# Patient Record
Sex: MALE | Race: WHITE | NOT HISPANIC OR LATINO | Employment: OTHER | URBAN - METROPOLITAN AREA
[De-identification: names, ages, dates, MRNs, and addresses within clinical notes are randomized per-mention and may not be internally consistent; named-entity substitution may affect disease eponyms.]

---

## 2023-04-10 DIAGNOSIS — R91.1 LUNG NODULE: Primary | ICD-10-CM

## 2023-05-12 ENCOUNTER — OFFICE VISIT (OUTPATIENT)
Dept: PRIMARY CARE | Facility: CLINIC | Age: 73
End: 2023-05-12
Payer: MEDICARE

## 2023-05-12 VITALS
TEMPERATURE: 96.4 F | HEIGHT: 71 IN | DIASTOLIC BLOOD PRESSURE: 68 MMHG | HEART RATE: 67 BPM | SYSTOLIC BLOOD PRESSURE: 105 MMHG | BODY MASS INDEX: 23.52 KG/M2 | WEIGHT: 168 LBS

## 2023-05-12 DIAGNOSIS — I10 ESSENTIAL (PRIMARY) HYPERTENSION: ICD-10-CM

## 2023-05-12 DIAGNOSIS — Z00.00 WELLNESS EXAMINATION: ICD-10-CM

## 2023-05-12 DIAGNOSIS — Z13.6 ENCOUNTER FOR ABDOMINAL AORTIC ANEURYSM SCREENING: ICD-10-CM

## 2023-05-12 DIAGNOSIS — I10 ESSENTIAL HYPERTENSION: ICD-10-CM

## 2023-05-12 DIAGNOSIS — G81.94 LEFT HEMIPARESIS (MULTI): Primary | ICD-10-CM

## 2023-05-12 DIAGNOSIS — F17.201 TOBACCO ABUSE, IN REMISSION: ICD-10-CM

## 2023-05-12 DIAGNOSIS — I65.21 CAROTID STENOSIS, RIGHT: ICD-10-CM

## 2023-05-12 DIAGNOSIS — E78.00 PURE HYPERCHOLESTEROLEMIA: ICD-10-CM

## 2023-05-12 PROBLEM — I69.398 DEPRESSION DUE TO OLD STROKE: Status: ACTIVE | Noted: 2023-05-12

## 2023-05-12 PROBLEM — I69.398 DEPRESSION DUE TO OLD STROKE: Status: RESOLVED | Noted: 2023-05-12 | Resolved: 2023-05-12

## 2023-05-12 PROBLEM — F06.31 DEPRESSION DUE TO OLD STROKE: Status: ACTIVE | Noted: 2023-05-12

## 2023-05-12 PROBLEM — F06.31 DEPRESSION DUE TO OLD STROKE: Status: RESOLVED | Noted: 2023-05-12 | Resolved: 2023-05-12

## 2023-05-12 PROBLEM — K21.9 GASTROESOPHAGEAL REFLUX DISEASE: Status: ACTIVE | Noted: 2023-05-12

## 2023-05-12 PROCEDURE — 1036F TOBACCO NON-USER: CPT | Performed by: INTERNAL MEDICINE

## 2023-05-12 PROCEDURE — 3078F DIAST BP <80 MM HG: CPT | Performed by: INTERNAL MEDICINE

## 2023-05-12 PROCEDURE — 1160F RVW MEDS BY RX/DR IN RCRD: CPT | Performed by: INTERNAL MEDICINE

## 2023-05-12 PROCEDURE — 1159F MED LIST DOCD IN RCRD: CPT | Performed by: INTERNAL MEDICINE

## 2023-05-12 PROCEDURE — 3074F SYST BP LT 130 MM HG: CPT | Performed by: INTERNAL MEDICINE

## 2023-05-12 PROCEDURE — 99213 OFFICE O/P EST LOW 20 MIN: CPT | Performed by: INTERNAL MEDICINE

## 2023-05-12 RX ORDER — METOPROLOL TARTRATE 25 MG/1
1 TABLET, FILM COATED ORAL EVERY OTHER DAY
COMMUNITY
Start: 2021-02-19 | End: 2023-08-11 | Stop reason: ALTCHOICE

## 2023-05-12 RX ORDER — CLOPIDOGREL BISULFATE 75 MG/1
1 TABLET ORAL DAILY
COMMUNITY
Start: 2021-05-16 | End: 2023-08-04

## 2023-05-12 RX ORDER — ATORVASTATIN CALCIUM 80 MG/1
1 TABLET, FILM COATED ORAL DAILY
COMMUNITY
Start: 2021-05-16 | End: 2023-08-04

## 2023-05-12 RX ORDER — NAPROXEN SODIUM 220 MG/1
TABLET, FILM COATED ORAL
COMMUNITY

## 2023-05-12 RX ORDER — ALBUTEROL SULFATE 90 UG/1
AEROSOL, METERED RESPIRATORY (INHALATION)
COMMUNITY
Start: 2021-02-10

## 2023-05-12 RX ORDER — ESOMEPRAZOLE MAGNESIUM 40 MG/1
1 CAPSULE, DELAYED RELEASE ORAL DAILY
COMMUNITY
Start: 2021-02-19 | End: 2023-08-04 | Stop reason: SDUPTHER

## 2023-05-12 RX ORDER — ESCITALOPRAM OXALATE 10 MG/1
1 TABLET ORAL DAILY
COMMUNITY
Start: 2021-02-19 | End: 2023-11-14 | Stop reason: SDUPTHER

## 2023-05-12 ASSESSMENT — ENCOUNTER SYMPTOMS
GASTROINTESTINAL NEGATIVE: 1
MUSCULOSKELETAL NEGATIVE: 1
CONSTITUTIONAL NEGATIVE: 1
ARTHRALGIAS: 0
EYES NEGATIVE: 1
WEAKNESS: 1
ABDOMINAL PAIN: 0
CARDIOVASCULAR NEGATIVE: 1
OCCASIONAL FEELINGS OF UNSTEADINESS: 0
RESPIRATORY NEGATIVE: 1
ANOREXIA: 0
NUMBNESS: 1
LOSS OF SENSATION IN FEET: 1
DIAPHORESIS: 0
DEPRESSION: 0

## 2023-05-12 NOTE — PROGRESS NOTES
"Subjective   Patient ID: Lukas Shen is a 73 y.o. male who presents for Follow-up (Has marks on chest that he scratched.   ).    Cerebrovascular Accident  This is a chronic problem. The current episode started more than 1 year ago. The problem occurs rarely. The problem has been gradually improving. Associated symptoms include congestion, numbness and weakness. Pertinent negatives include no abdominal pain, anorexia, arthralgias, chest pain or diaphoresis. The treatment provided moderate relief.    Carotids has been checked in Nov 2022, has total occlusion rt side    Review of Systems   Constitutional: Negative.  Negative for diaphoresis.   HENT:  Positive for congestion.    Eyes: Negative.    Respiratory: Negative.     Cardiovascular: Negative.  Negative for chest pain.   Gastrointestinal: Negative.  Negative for abdominal pain and anorexia.   Musculoskeletal: Negative.  Negative for arthralgias.   Skin: Negative.    Neurological:  Positive for weakness and numbness.       Objective   /68 (BP Location: Right arm, Patient Position: Sitting, BP Cuff Size: Adult)   Pulse 67   Temp 35.8 °C (96.4 °F) (Temporal)   Ht 1.803 m (5' 11\")   Wt 76.2 kg (168 lb)   BMI 23.43 kg/m²     Physical Exam  Vitals reviewed.   Constitutional:       Appearance: Normal appearance. He is normal weight.   HENT:      Head: Normocephalic and atraumatic.   Eyes:      Conjunctiva/sclera: Conjunctivae normal.   Cardiovascular:      Rate and Rhythm: Normal rate and regular rhythm.      Pulses: Normal pulses.   Pulmonary:      Effort: Pulmonary effort is normal.      Breath sounds: Normal breath sounds.   Abdominal:      Palpations: Abdomen is soft.   Musculoskeletal:         General: Normal range of motion.      Cervical back: Normal range of motion.   Skin:     General: Skin is warm and dry.   Neurological:      Motor: Weakness present.      Gait: Gait abnormal.   Psychiatric:         Mood and Affect: Mood normal. "       Assessment/Plan   Problem List Items Addressed This Visit          Nervous    Left hemiparesis (CMS/HCC) - Primary       Circulatory    Essential hypertension    Carotid stenosis, right       Other    Pure hypercholesterolemia   We are trying to do low-dose CT scan of this lung as it was mentioned, I see that there was a message and actually I am very well aware about doing low-dose CT scan as appropriately as they are asking, they are giving hard time in the hospital and scheduling it 4 months from the scheduling time when family calls so it is very difficult for us to do things as they are asking us to do repeatedly.  So we call scheduling and we got his low-dose CT scan of lung scheduled on 26 May, also he will require abdominal ultrasound for aneurysm screening, also he will require laboratories next time.  He is doing well, he has a stroke, he has a residual deficit, he has a totally occluded carotid artery, his BP readings are stable, he did not pass out, high-dose of statin therapy to be continued.  He has some congestion in the throat but does not have any pain or discomfort consistent with angina, vaccinations are reviewed, follow-up in 3 months.

## 2023-05-15 ENCOUNTER — LAB (OUTPATIENT)
Dept: LAB | Facility: LAB | Age: 73
End: 2023-05-15
Payer: MEDICARE

## 2023-05-15 DIAGNOSIS — Z00.00 WELLNESS EXAMINATION: ICD-10-CM

## 2023-05-15 DIAGNOSIS — E78.00 PURE HYPERCHOLESTEROLEMIA: ICD-10-CM

## 2023-05-15 DIAGNOSIS — I10 ESSENTIAL HYPERTENSION: ICD-10-CM

## 2023-05-15 LAB
ALANINE AMINOTRANSFERASE (SGPT) (U/L) IN SER/PLAS: 32 U/L (ref 10–52)
ALBUMIN (G/DL) IN SER/PLAS: 4.1 G/DL (ref 3.4–5)
ALKALINE PHOSPHATASE (U/L) IN SER/PLAS: 123 U/L (ref 33–136)
ANION GAP IN SER/PLAS: 10 MMOL/L (ref 10–20)
ASPARTATE AMINOTRANSFERASE (SGOT) (U/L) IN SER/PLAS: 19 U/L (ref 9–39)
BASOPHILS (10*3/UL) IN BLOOD BY AUTOMATED COUNT: 0.06 X10E9/L (ref 0–0.1)
BASOPHILS/100 LEUKOCYTES IN BLOOD BY AUTOMATED COUNT: 0.8 % (ref 0–2)
BILIRUBIN TOTAL (MG/DL) IN SER/PLAS: 0.7 MG/DL (ref 0–1.2)
CALCIUM (MG/DL) IN SER/PLAS: 9.7 MG/DL (ref 8.6–10.3)
CARBON DIOXIDE, TOTAL (MMOL/L) IN SER/PLAS: 32 MMOL/L (ref 21–32)
CHLORIDE (MMOL/L) IN SER/PLAS: 103 MMOL/L (ref 98–107)
CHOLESTEROL (MG/DL) IN SER/PLAS: 115 MG/DL (ref 0–199)
CHOLESTEROL IN HDL (MG/DL) IN SER/PLAS: 31.8 MG/DL
CHOLESTEROL/HDL RATIO: 3.6
CREATININE (MG/DL) IN SER/PLAS: 1.19 MG/DL (ref 0.5–1.3)
EOSINOPHILS (10*3/UL) IN BLOOD BY AUTOMATED COUNT: 0.14 X10E9/L (ref 0–0.4)
EOSINOPHILS/100 LEUKOCYTES IN BLOOD BY AUTOMATED COUNT: 1.9 % (ref 0–6)
ERYTHROCYTE DISTRIBUTION WIDTH (RATIO) BY AUTOMATED COUNT: 12.4 % (ref 11.5–14.5)
ERYTHROCYTE MEAN CORPUSCULAR HEMOGLOBIN CONCENTRATION (G/DL) BY AUTOMATED: 33.4 G/DL (ref 32–36)
ERYTHROCYTE MEAN CORPUSCULAR VOLUME (FL) BY AUTOMATED COUNT: 95 FL (ref 80–100)
ERYTHROCYTES (10*6/UL) IN BLOOD BY AUTOMATED COUNT: 4.9 X10E12/L (ref 4.5–5.9)
GFR MALE: 64 ML/MIN/1.73M2
GLUCOSE (MG/DL) IN SER/PLAS: 81 MG/DL (ref 74–99)
HEMATOCRIT (%) IN BLOOD BY AUTOMATED COUNT: 46.4 % (ref 41–52)
HEMOGLOBIN (G/DL) IN BLOOD: 15.5 G/DL (ref 13.5–17.5)
HEPATITIS C VIRUS AB PRESENCE IN SERUM: NONREACTIVE
IMMATURE GRANULOCYTES/100 LEUKOCYTES IN BLOOD BY AUTOMATED COUNT: 0.1 % (ref 0–0.9)
LDL: 63 MG/DL (ref 0–99)
LEUKOCYTES (10*3/UL) IN BLOOD BY AUTOMATED COUNT: 7.4 X10E9/L (ref 4.4–11.3)
LYMPHOCYTES (10*3/UL) IN BLOOD BY AUTOMATED COUNT: 1.53 X10E9/L (ref 0.8–3)
LYMPHOCYTES/100 LEUKOCYTES IN BLOOD BY AUTOMATED COUNT: 20.8 % (ref 13–44)
MONOCYTES (10*3/UL) IN BLOOD BY AUTOMATED COUNT: 0.41 X10E9/L (ref 0.05–0.8)
MONOCYTES/100 LEUKOCYTES IN BLOOD BY AUTOMATED COUNT: 5.6 % (ref 2–10)
NEUTROPHILS (10*3/UL) IN BLOOD BY AUTOMATED COUNT: 5.21 X10E9/L (ref 1.6–5.5)
NEUTROPHILS/100 LEUKOCYTES IN BLOOD BY AUTOMATED COUNT: 70.8 % (ref 40–80)
PLATELETS (10*3/UL) IN BLOOD AUTOMATED COUNT: 257 X10E9/L (ref 150–450)
POTASSIUM (MMOL/L) IN SER/PLAS: 4.3 MMOL/L (ref 3.5–5.3)
PROSTATE SPECIFIC ANTIGEN,SCREEN: 0.79 NG/ML (ref 0–4)
PROTEIN TOTAL: 7.3 G/DL (ref 6.4–8.2)
SODIUM (MMOL/L) IN SER/PLAS: 141 MMOL/L (ref 136–145)
TRIGLYCERIDE (MG/DL) IN SER/PLAS: 99 MG/DL (ref 0–149)
UREA NITROGEN (MG/DL) IN SER/PLAS: 14 MG/DL (ref 6–23)
VLDL: 20 MG/DL (ref 0–40)

## 2023-05-15 PROCEDURE — 85025 COMPLETE CBC W/AUTO DIFF WBC: CPT

## 2023-05-15 PROCEDURE — 36415 COLL VENOUS BLD VENIPUNCTURE: CPT

## 2023-05-15 PROCEDURE — 86803 HEPATITIS C AB TEST: CPT

## 2023-05-15 PROCEDURE — 84153 ASSAY OF PSA TOTAL: CPT

## 2023-05-15 PROCEDURE — 80061 LIPID PANEL: CPT

## 2023-05-15 PROCEDURE — 80053 COMPREHEN METABOLIC PANEL: CPT

## 2023-08-04 DIAGNOSIS — F32.A DEPRESSION, UNSPECIFIED DEPRESSION TYPE: ICD-10-CM

## 2023-08-04 DIAGNOSIS — I65.21 CAROTID STENOSIS, RIGHT: Primary | ICD-10-CM

## 2023-08-04 DIAGNOSIS — K21.9 GASTROESOPHAGEAL REFLUX DISEASE WITHOUT ESOPHAGITIS: ICD-10-CM

## 2023-08-04 RX ORDER — ATORVASTATIN CALCIUM 80 MG/1
80 TABLET, FILM COATED ORAL DAILY
Qty: 90 TABLET | Refills: 3 | Status: SHIPPED | OUTPATIENT
Start: 2023-08-04 | End: 2023-11-14 | Stop reason: SDUPTHER

## 2023-08-04 RX ORDER — ESOMEPRAZOLE MAGNESIUM 40 MG/1
40 CAPSULE, DELAYED RELEASE ORAL DAILY
Qty: 90 CAPSULE | Refills: 3 | Status: SHIPPED | OUTPATIENT
Start: 2023-08-04 | End: 2023-11-14 | Stop reason: SDUPTHER

## 2023-08-04 RX ORDER — ESCITALOPRAM OXALATE 20 MG/1
20 TABLET ORAL DAILY
Qty: 90 TABLET | Refills: 3 | Status: SHIPPED | OUTPATIENT
Start: 2023-08-04 | End: 2023-08-11

## 2023-08-04 RX ORDER — CLOPIDOGREL BISULFATE 75 MG/1
75 TABLET ORAL DAILY
Qty: 90 TABLET | Refills: 3 | Status: SHIPPED | OUTPATIENT
Start: 2023-08-04 | End: 2023-11-14 | Stop reason: SDUPTHER

## 2023-08-08 ENCOUNTER — PATIENT OUTREACH (OUTPATIENT)
Dept: CARE COORDINATION | Facility: CLINIC | Age: 73
End: 2023-08-08
Payer: MEDICARE

## 2023-08-08 RX ORDER — DOXYCYCLINE 100 MG/1
100 CAPSULE ORAL 2 TIMES DAILY
COMMUNITY
Start: 2023-08-07 | End: 2023-08-11 | Stop reason: ALTCHOICE

## 2023-08-08 RX ORDER — L. ACIDOPHILUS/LACTOBAC SPOR 35MM-25MM
1 TABLET ORAL DAILY
COMMUNITY
Start: 2023-08-07 | End: 2023-08-16

## 2023-08-08 NOTE — PROGRESS NOTES
Discharge Facility: [facility]  Discharge Diagnosis: presyncopal episode, pneumonia  Admission Date: 8/3/23  Discharge Date: 8/7/23    PCP Appointment Date: 8/11/23  Specialist Appointment Date: Feb 2024 Dr Jaquez  Hospital Encounter and Summary: Linked  See discharge assessment below for further details    Engagement  Call Start Time: 1017 (8/8/2023 10:33 AM)    Medications  Medications reviewed with patient/caregiver?: Yes (8/8/2023 10:33 AM)  Is the patient having any side effects they believe may be caused by any medication additions or changes?: No (8/8/2023 10:33 AM)  Does the patient have all medications ordered at discharge?: Yes (8/8/2023 10:33 AM)  Care Management Interventions: No intervention needed (8/8/2023 10:33 AM)  Prescription Comments: on course of doxycycline for pneumonia (8/8/2023 10:33 AM)  Is the patient taking all medications as directed (includes completed medication regime)?: Yes (8/8/2023 10:33 AM)  Care Management Interventions: Provided patient education (8/8/2023 10:33 AM)    Appointments  Does the patient have a primary care provider?: Yes (8/8/2023 10:33 AM)  Care Management Interventions: Verified appointment date/time/provider (8/8/2023 10:33 AM)  Has the patient kept scheduled appointments due by today?: Yes (8/8/2023 10:33 AM)  Care Management Interventions: Advised patient to keep appointment (8/8/2023 10:33 AM)    Patient Teaching  Does the patient have access to their discharge instructions?: Yes (8/8/2023 10:33 AM)  Care Management Interventions: Reviewed instructions with patient (8/8/2023 10:33 AM)  What is the patient's perception of their health status since discharge?: Improving (8/8/2023 10:33 AM)  Is the patient/caregiver able to teach back the hierarchy of who to call/visit for symptoms/problems? PCP, Specialist, Home Health nurse, Urgent Care, ED, 911: Yes (8/8/2023 10:33 AM)  Patient/Caregiver Education Comments: Spoke with spouse patient is doing better after hospital  stay, he has less diarrhea and is resting at home. Aware of when to return to hospital (8/8/2023 10:33 AM)    Wrap Up  Call End Time: 1025 (8/8/2023 10:33 AM)

## 2023-08-11 ENCOUNTER — OFFICE VISIT (OUTPATIENT)
Dept: PRIMARY CARE | Facility: CLINIC | Age: 73
End: 2023-08-11
Payer: MEDICARE

## 2023-08-11 VITALS
SYSTOLIC BLOOD PRESSURE: 98 MMHG | WEIGHT: 160 LBS | BODY MASS INDEX: 25.11 KG/M2 | HEART RATE: 69 BPM | TEMPERATURE: 97.7 F | HEIGHT: 67 IN | DIASTOLIC BLOOD PRESSURE: 64 MMHG

## 2023-08-11 DIAGNOSIS — I65.21 CAROTID STENOSIS, RIGHT: ICD-10-CM

## 2023-08-11 DIAGNOSIS — R55 VASOVAGAL SYNCOPE: Primary | ICD-10-CM

## 2023-08-11 DIAGNOSIS — R91.8 PULMONARY INFILTRATES: ICD-10-CM

## 2023-08-11 PROCEDURE — 99496 TRANSJ CARE MGMT HIGH F2F 7D: CPT | Performed by: INTERNAL MEDICINE

## 2023-08-11 PROCEDURE — 1159F MED LIST DOCD IN RCRD: CPT | Performed by: INTERNAL MEDICINE

## 2023-08-11 PROCEDURE — 1036F TOBACCO NON-USER: CPT | Performed by: INTERNAL MEDICINE

## 2023-08-11 PROCEDURE — 1160F RVW MEDS BY RX/DR IN RCRD: CPT | Performed by: INTERNAL MEDICINE

## 2023-08-11 PROCEDURE — 3078F DIAST BP <80 MM HG: CPT | Performed by: INTERNAL MEDICINE

## 2023-08-11 PROCEDURE — 3074F SYST BP LT 130 MM HG: CPT | Performed by: INTERNAL MEDICINE

## 2023-08-11 ASSESSMENT — ENCOUNTER SYMPTOMS
EYES NEGATIVE: 1
MUSCULOSKELETAL NEGATIVE: 1
RESPIRATORY NEGATIVE: 1
CARDIOVASCULAR NEGATIVE: 1
DIZZINESS: 0
GASTROINTESTINAL NEGATIVE: 1
CONSTITUTIONAL NEGATIVE: 1

## 2023-08-11 NOTE — PROGRESS NOTES
"Patient: Lukas Shen  : 1950  PCP: Conor Roe MD  MRN: [id number]  Program: No linked episodes     Lukas Shen is a 73 y.o. male presenting today for follow-up after being discharged from the hospital 5 days ago. The main problem requiring admission was syncope and infiltrate. The discharge summary and/or Transitional Care Management documentation was reviewed. Medication reconciliation was performed as indicated via the \"Champ as Reviewed\" timestamp.     Lukas Shen was contacted by Transitional Care Management services two days after his discharge. This encounter and supporting documentation was reviewed.    The complexity of medical decision making for this patient's transitional care is high.  Patient was hospitalized, he has a syncopal episode, he was having bowel movement, it was a vasovagal reaction, multiple test and investigations were done, there was alveolar infiltrates, it could be the sequelae of some aspiration he kept on having, he was not septic, they did a vascular study, there was a chronic problem in the vasculature, nothing new was seen, chronic occlusion of right carotid artery is nothing new, he has a massive stroke more than 2 years ago, no other etiology was found for a syncopal episode, he is on beta-blocker on every other day regimen, previously we have noticed that his BP readings has been low on several occasions, low-dose CT from May was reviewed.  He looks much better today, discharge summary and medications were reviewed.  Physical Exam  Vitals reviewed.   Constitutional:       Appearance: Normal appearance. He is normal weight.   HENT:      Head: Normocephalic and atraumatic.   Eyes:      Conjunctiva/sclera: Conjunctivae normal.   Cardiovascular:      Rate and Rhythm: Normal rate and regular rhythm.      Pulses:           Carotid pulses are 1+ on the right side with bruit and 2+ on the left side.  Pulmonary:      Effort: Pulmonary effort is normal.      Breath " sounds: Normal breath sounds.   Abdominal:      Palpations: Abdomen is soft.   Musculoskeletal:         General: Normal range of motion.      Cervical back: Normal range of motion.   Skin:     General: Skin is warm and dry.   Neurological:      General: No focal deficit present.      Motor: Weakness present.      Gait: Gait abnormal.   Psychiatric:         Mood and Affect: Mood normal.       Assessment/Plan   Problem List Items Addressed This Visit       Carotid stenosis, right     Other Visit Diagnoses       Vasovagal syncope    -  Primary    Pulmonary infiltrates            Recent hospitalization data and information reviewed, all reports, labs, radiological investigations and consultations and follow ups reviewed during this visit. Pt is doing well, precautions to be taken in the future for acute ailments or acute illness and change of condition are discussed, will continue to have close follow up, medication list was reviewed and updated and necessary changes were applied.  We will discontinue metoprolol, still BP readings remains on the low side of normal, he has a chronically lower BP readings, he never has been hypertensive, so high-dose of statin to be continued, totally occluded carotid needs to be left alone, statin, aspirin, Plavix has been maintained, syncopal episode happened third time now, every time seems to be vasovagal, pulmonary infiltrate was not worrisome, he is back to his routine, went through all the medications and test and investigations, follow-up in 3 months.  This is a transitional care related visit.    Review of Systems   Constitutional: Negative.    HENT: Negative.     Eyes: Negative.    Respiratory: Negative.     Cardiovascular: Negative.    Gastrointestinal: Negative.    Musculoskeletal: Negative.    Skin: Negative.    Neurological:  Negative for dizziness.       No family history on file.    Engagement  Call Start Time: 1017 (8/8/2023 10:33 AM)    Medications  Medications reviewed  with patient/caregiver?: Yes (8/8/2023 10:33 AM)  Is the patient having any side effects they believe may be caused by any medication additions or changes?: No (8/8/2023 10:33 AM)  Does the patient have all medications ordered at discharge?: Yes (8/8/2023 10:33 AM)  Care Management Interventions: No intervention needed (8/8/2023 10:33 AM)  Prescription Comments: on course of doxycycline for pneumonia (8/8/2023 10:33 AM)  Is the patient taking all medications as directed (includes completed medication regime)?: Yes (8/8/2023 10:33 AM)  Care Management Interventions: Provided patient education (8/8/2023 10:33 AM)    Appointments  Does the patient have a primary care provider?: Yes (8/8/2023 10:33 AM)  Care Management Interventions: Verified appointment date/time/provider (8/8/2023 10:33 AM)  Has the patient kept scheduled appointments due by today?: Yes (8/8/2023 10:33 AM)  Care Management Interventions: Advised patient to keep appointment (8/8/2023 10:33 AM)    Patient Teaching  Does the patient have access to their discharge instructions?: Yes (8/8/2023 10:33 AM)  Care Management Interventions: Reviewed instructions with patient (8/8/2023 10:33 AM)  What is the patient's perception of their health status since discharge?: Improving (8/8/2023 10:33 AM)  Is the patient/caregiver able to teach back the hierarchy of who to call/visit for symptoms/problems? PCP, Specialist, Home Health nurse, Urgent Care, ED, 911: Yes (8/8/2023 10:33 AM)  Patient/Caregiver Education Comments: Spoke with spouse patient is doing better after hospital stay, he has less diarrhea and is resting at home. Aware of when to return to hospital (8/8/2023 10:33 AM)    Wrap Up  Call End Time: 1025 (8/8/2023 10:33 AM)        No follow-ups on file.

## 2023-08-16 ENCOUNTER — PATIENT OUTREACH (OUTPATIENT)
Dept: CARE COORDINATION | Facility: CLINIC | Age: 73
End: 2023-08-16
Payer: MEDICARE

## 2023-08-16 NOTE — PROGRESS NOTES
Call regarding appt. with PCP on 8/11/23 after hospitalization.  At time of outreach call the patient feels as if their condition has improved since last visit.  Reviewed the PCP appointment with the pt and addressed any questions or concerns. Much better since off metoprolol

## 2023-09-19 ENCOUNTER — PATIENT OUTREACH (OUTPATIENT)
Dept: CARE COORDINATION | Facility: CLINIC | Age: 73
End: 2023-09-19
Payer: MEDICARE

## 2023-10-31 ENCOUNTER — PATIENT OUTREACH (OUTPATIENT)
Dept: CARE COORDINATION | Facility: CLINIC | Age: 73
End: 2023-10-31
Payer: MEDICARE

## 2023-10-31 NOTE — PROGRESS NOTES
90 day (final) call post hospital stay completed.  This CM called and spoke with pt  via phone to address any final questions or concerns regarding recent hospitalization.  Pt reports doing well and has no concerns at this time.  Contact info provided to pt for non-emergent concerns.

## 2023-11-14 ENCOUNTER — OFFICE VISIT (OUTPATIENT)
Dept: PRIMARY CARE | Facility: CLINIC | Age: 73
End: 2023-11-14
Payer: MEDICARE

## 2023-11-14 VITALS
TEMPERATURE: 96.9 F | HEART RATE: 67 BPM | BODY MASS INDEX: 25.11 KG/M2 | DIASTOLIC BLOOD PRESSURE: 68 MMHG | SYSTOLIC BLOOD PRESSURE: 100 MMHG | HEIGHT: 67 IN | WEIGHT: 160 LBS

## 2023-11-14 DIAGNOSIS — I63.231 CEREBROVASCULAR ACCIDENT (CVA) DUE TO STENOSIS OF RIGHT CAROTID ARTERY (MULTI): ICD-10-CM

## 2023-11-14 DIAGNOSIS — L98.9 SKIN LESION: Primary | ICD-10-CM

## 2023-11-14 DIAGNOSIS — E78.00 PURE HYPERCHOLESTEROLEMIA: ICD-10-CM

## 2023-11-14 DIAGNOSIS — K21.9 GASTROESOPHAGEAL REFLUX DISEASE WITHOUT ESOPHAGITIS: ICD-10-CM

## 2023-11-14 DIAGNOSIS — I65.21 CAROTID STENOSIS, RIGHT: ICD-10-CM

## 2023-11-14 DIAGNOSIS — F32.A DEPRESSION, UNSPECIFIED DEPRESSION TYPE: ICD-10-CM

## 2023-11-14 PROCEDURE — 1159F MED LIST DOCD IN RCRD: CPT | Performed by: INTERNAL MEDICINE

## 2023-11-14 PROCEDURE — 1036F TOBACCO NON-USER: CPT | Performed by: INTERNAL MEDICINE

## 2023-11-14 PROCEDURE — 3078F DIAST BP <80 MM HG: CPT | Performed by: INTERNAL MEDICINE

## 2023-11-14 PROCEDURE — 99213 OFFICE O/P EST LOW 20 MIN: CPT | Performed by: INTERNAL MEDICINE

## 2023-11-14 PROCEDURE — 3074F SYST BP LT 130 MM HG: CPT | Performed by: INTERNAL MEDICINE

## 2023-11-14 PROCEDURE — 1160F RVW MEDS BY RX/DR IN RCRD: CPT | Performed by: INTERNAL MEDICINE

## 2023-11-14 RX ORDER — ATORVASTATIN CALCIUM 80 MG/1
80 TABLET, FILM COATED ORAL DAILY
Qty: 90 TABLET | Refills: 3 | Status: SHIPPED | OUTPATIENT
Start: 2023-11-14

## 2023-11-14 RX ORDER — CLOPIDOGREL BISULFATE 75 MG/1
75 TABLET ORAL DAILY
Qty: 90 TABLET | Refills: 3 | Status: SHIPPED | OUTPATIENT
Start: 2023-11-14

## 2023-11-14 RX ORDER — ESOMEPRAZOLE MAGNESIUM 40 MG/1
40 CAPSULE, DELAYED RELEASE ORAL DAILY
Qty: 90 CAPSULE | Refills: 3 | Status: SHIPPED | OUTPATIENT
Start: 2023-11-14

## 2023-11-14 RX ORDER — ESCITALOPRAM OXALATE 10 MG/1
10 TABLET ORAL DAILY
Qty: 90 TABLET | Refills: 3 | Status: SHIPPED | OUTPATIENT
Start: 2023-11-14

## 2023-11-14 ASSESSMENT — ENCOUNTER SYMPTOMS
RESPIRATORY NEGATIVE: 1
EYES NEGATIVE: 1
WEAKNESS: 1
CONSTITUTIONAL NEGATIVE: 1
DIZZINESS: 1
TREMORS: 0
CARDIOVASCULAR NEGATIVE: 1
GASTROINTESTINAL NEGATIVE: 1
ARTHRALGIAS: 1

## 2023-11-14 NOTE — PROGRESS NOTES
"Subjective   Patient ID: Lukas Shen is a 73 y.o. male who presents for Follow-up (3 mo fu and med refills).    Hyperlipidemia  This is a chronic problem. The current episode started more than 1 year ago. The problem is controlled. Recent lipid tests were reviewed and are normal. He has no history of chronic renal disease or diabetes. Pertinent negatives include no chest pain. Current antihyperlipidemic treatment includes statins. The current treatment provides significant improvement of lipids. Risk factors for coronary artery disease include dyslipidemia.      Cerebrovascular Accident  This is a chronic problem. The current episode started more than 1 year ago. The problem occurs rarely. The problem has been gradually improving. Associated symptoms include congestion, numbness and weakness. Pertinent negatives include no abdominal pain, anorexia, arthralgias, chest pain or diaphoresis. The treatment provided moderate relief. CT angio neck noted and same findings    Review of Systems   Constitutional: Negative.    HENT: Negative.     Eyes: Negative.    Respiratory: Negative.     Cardiovascular: Negative.  Negative for chest pain.   Gastrointestinal: Negative.    Musculoskeletal:  Positive for arthralgias.   Skin: Negative.    Neurological:  Positive for dizziness, syncope and weakness. Negative for tremors.       Objective   /68 (BP Location: Right arm, Patient Position: Sitting, BP Cuff Size: Adult)   Pulse 67   Temp 36.1 °C (96.9 °F) (Temporal)   Ht 1.702 m (5' 7\")   Wt 72.6 kg (160 lb)   BMI 25.06 kg/m²     Physical Exam  Vitals reviewed.   Constitutional:       Appearance: Normal appearance. He is normal weight.   HENT:      Head: Normocephalic and atraumatic.   Eyes:      Conjunctiva/sclera: Conjunctivae normal.   Cardiovascular:      Rate and Rhythm: Normal rate and regular rhythm.      Pulses:           Carotid pulses are 1+ on the right side with bruit and 2+ on the left side.  Pulmonary:      " Effort: Pulmonary effort is normal.      Breath sounds: Normal breath sounds.   Abdominal:      Palpations: Abdomen is soft.   Musculoskeletal:         General: Normal range of motion.      Cervical back: Normal range of motion.   Skin:     General: Skin is warm and dry.   Neurological:      General:  focal deficit present.      Motor: Weakness present.      Gait: Gait abnormal.   Psychiatric:         Mood and Affect: Mood normal.   Assessment/Plan   Problem List Items Addressed This Visit             ICD-10-CM    Gastroesophageal reflux disease K21.9    Relevant Medications    esomeprazole (NexIUM) 40 mg DR capsule    Pure hypercholesterolemia E78.00    Relevant Medications    atorvastatin (Lipitor) 80 mg tablet    Other Relevant Orders    CBC and Auto Differential    Lipid Panel    Comprehensive Metabolic Panel    Carotid stenosis, right I65.21    Relevant Medications    clopidogrel (Plavix) 75 mg tablet    atorvastatin (Lipitor) 80 mg tablet    Other Relevant Orders    CBC and Auto Differential    Lipid Panel    Comprehensive Metabolic Panel     Other Visit Diagnoses         Codes    Skin lesion    -  Primary L98.9    Relevant Orders    Referral to Dermatology    Depression, unspecified depression type     F32.A    Relevant Medications    escitalopram (Lexapro) 10 mg tablet    Other Relevant Orders    CBC and Auto Differential    Cerebrovascular accident (CVA) due to stenosis of right carotid artery (CMS/[facility])     I63.231        CT angio was reviewed, changes were seen, old stroke with hemiparesis and spasticity persist.  1 syncopal episode typical of vasovagal effect not requiring any acute care.  Depression is in remission.  Statin therapy to be continued, couple of lesions 1 below the right eye can be attended by dermatology.  BP readings are slightly on the lower side of normal always.  Statin therapy at the high dose is maintained.  Wife was present today, no concerns or questions, laboratories next time,  follow-up in 3 months.

## 2024-02-07 ENCOUNTER — TELEPHONE (OUTPATIENT)
Dept: CARDIOLOGY | Facility: CLINIC | Age: 74
End: 2024-02-07

## 2024-02-07 ENCOUNTER — LAB (OUTPATIENT)
Dept: LAB | Facility: LAB | Age: 74
End: 2024-02-07
Payer: MEDICARE

## 2024-02-07 ENCOUNTER — HOSPITAL ENCOUNTER (OUTPATIENT)
Dept: CARDIOLOGY | Facility: CLINIC | Age: 74
Discharge: HOME | End: 2024-02-07
Payer: MEDICARE

## 2024-02-07 DIAGNOSIS — I65.21 CAROTID STENOSIS, RIGHT: Primary | ICD-10-CM

## 2024-02-07 DIAGNOSIS — I65.23 BILATERAL CAROTID ARTERY STENOSIS: ICD-10-CM

## 2024-02-07 PROCEDURE — 93880 EXTRACRANIAL BILAT STUDY: CPT | Performed by: INTERNAL MEDICINE

## 2024-02-07 PROCEDURE — 93880 EXTRACRANIAL BILAT STUDY: CPT

## 2024-02-15 ENCOUNTER — APPOINTMENT (OUTPATIENT)
Dept: VASCULAR SURGERY | Facility: CLINIC | Age: 74
End: 2024-02-15
Payer: MEDICARE

## 2024-02-15 ENCOUNTER — LAB (OUTPATIENT)
Dept: LAB | Facility: LAB | Age: 74
End: 2024-02-15
Payer: MEDICARE

## 2024-02-15 ENCOUNTER — OFFICE VISIT (OUTPATIENT)
Dept: PRIMARY CARE | Facility: CLINIC | Age: 74
End: 2024-02-15
Payer: MEDICARE

## 2024-02-15 VITALS
WEIGHT: 171 LBS | HEART RATE: 71 BPM | HEIGHT: 64 IN | TEMPERATURE: 96.2 F | DIASTOLIC BLOOD PRESSURE: 66 MMHG | BODY MASS INDEX: 29.19 KG/M2 | SYSTOLIC BLOOD PRESSURE: 111 MMHG

## 2024-02-15 DIAGNOSIS — F32.A DEPRESSION, UNSPECIFIED DEPRESSION TYPE: ICD-10-CM

## 2024-02-15 DIAGNOSIS — Z12.11 SCREENING FOR COLORECTAL CANCER: ICD-10-CM

## 2024-02-15 DIAGNOSIS — Z00.00 ROUTINE GENERAL MEDICAL EXAMINATION AT HEALTH CARE FACILITY: Primary | ICD-10-CM

## 2024-02-15 DIAGNOSIS — E78.00 PURE HYPERCHOLESTEROLEMIA: ICD-10-CM

## 2024-02-15 DIAGNOSIS — Z12.12 SCREENING FOR COLORECTAL CANCER: ICD-10-CM

## 2024-02-15 DIAGNOSIS — I65.21 CAROTID STENOSIS, RIGHT: ICD-10-CM

## 2024-02-15 DIAGNOSIS — I69.354 HEMIPLEGIA AND HEMIPARESIS FOLLOWING CEREBRAL INFARCTION AFFECTING LEFT NON-DOMINANT SIDE (MULTI): ICD-10-CM

## 2024-02-15 LAB
ALBUMIN SERPL BCP-MCNC: 4.5 G/DL (ref 3.4–5)
ALP SERPL-CCNC: 117 U/L (ref 33–136)
ALT SERPL W P-5'-P-CCNC: 16 U/L (ref 10–52)
ANION GAP SERPL CALC-SCNC: 12 MMOL/L (ref 10–20)
AST SERPL W P-5'-P-CCNC: 22 U/L (ref 9–39)
BASOPHILS # BLD AUTO: 0.04 X10*3/UL (ref 0–0.1)
BASOPHILS NFR BLD AUTO: 0.5 %
BILIRUB SERPL-MCNC: 0.5 MG/DL (ref 0–1.2)
BUN SERPL-MCNC: 17 MG/DL (ref 6–23)
CALCIUM SERPL-MCNC: 9.9 MG/DL (ref 8.6–10.3)
CHLORIDE SERPL-SCNC: 103 MMOL/L (ref 98–107)
CHOLEST SERPL-MCNC: 129 MG/DL (ref 0–199)
CHOLESTEROL/HDL RATIO: 3.7
CO2 SERPL-SCNC: 30 MMOL/L (ref 21–32)
CREAT SERPL-MCNC: 1.33 MG/DL (ref 0.5–1.3)
EGFRCR SERPLBLD CKD-EPI 2021: 56 ML/MIN/1.73M*2
EOSINOPHIL # BLD AUTO: 0.09 X10*3/UL (ref 0–0.4)
EOSINOPHIL NFR BLD AUTO: 1.2 %
ERYTHROCYTE [DISTWIDTH] IN BLOOD BY AUTOMATED COUNT: 12.7 % (ref 11.5–14.5)
GLUCOSE SERPL-MCNC: 82 MG/DL (ref 74–99)
HCT VFR BLD AUTO: 47.7 % (ref 41–52)
HDLC SERPL-MCNC: 34.8 MG/DL
HGB BLD-MCNC: 16.1 G/DL (ref 13.5–17.5)
IMM GRANULOCYTES # BLD AUTO: 0.01 X10*3/UL (ref 0–0.5)
IMM GRANULOCYTES NFR BLD AUTO: 0.1 % (ref 0–0.9)
LDLC SERPL CALC-MCNC: 70 MG/DL
LYMPHOCYTES # BLD AUTO: 1.54 X10*3/UL (ref 0.8–3)
LYMPHOCYTES NFR BLD AUTO: 19.8 %
MCH RBC QN AUTO: 31.7 PG (ref 26–34)
MCHC RBC AUTO-ENTMCNC: 33.8 G/DL (ref 32–36)
MCV RBC AUTO: 94 FL (ref 80–100)
MONOCYTES # BLD AUTO: 0.49 X10*3/UL (ref 0.05–0.8)
MONOCYTES NFR BLD AUTO: 6.3 %
NEUTROPHILS # BLD AUTO: 5.59 X10*3/UL (ref 1.6–5.5)
NEUTROPHILS NFR BLD AUTO: 72.1 %
NON HDL CHOLESTEROL: 94 MG/DL (ref 0–149)
NRBC BLD-RTO: 0 /100 WBCS (ref 0–0)
PLATELET # BLD AUTO: 234 X10*3/UL (ref 150–450)
POTASSIUM SERPL-SCNC: 4.7 MMOL/L (ref 3.5–5.3)
PROT SERPL-MCNC: 7.9 G/DL (ref 6.4–8.2)
RBC # BLD AUTO: 5.08 X10*6/UL (ref 4.5–5.9)
SODIUM SERPL-SCNC: 140 MMOL/L (ref 136–145)
TRIGL SERPL-MCNC: 123 MG/DL (ref 0–149)
VLDL: 25 MG/DL (ref 0–40)
WBC # BLD AUTO: 7.8 X10*3/UL (ref 4.4–11.3)

## 2024-02-15 PROCEDURE — 36415 COLL VENOUS BLD VENIPUNCTURE: CPT

## 2024-02-15 PROCEDURE — 1160F RVW MEDS BY RX/DR IN RCRD: CPT | Performed by: INTERNAL MEDICINE

## 2024-02-15 PROCEDURE — 80061 LIPID PANEL: CPT

## 2024-02-15 PROCEDURE — 3078F DIAST BP <80 MM HG: CPT | Performed by: INTERNAL MEDICINE

## 2024-02-15 PROCEDURE — 85025 COMPLETE CBC W/AUTO DIFF WBC: CPT

## 2024-02-15 PROCEDURE — G0439 PPPS, SUBSEQ VISIT: HCPCS | Performed by: INTERNAL MEDICINE

## 2024-02-15 PROCEDURE — 3074F SYST BP LT 130 MM HG: CPT | Performed by: INTERNAL MEDICINE

## 2024-02-15 PROCEDURE — 80053 COMPREHEN METABOLIC PANEL: CPT

## 2024-02-15 PROCEDURE — 1036F TOBACCO NON-USER: CPT | Performed by: INTERNAL MEDICINE

## 2024-02-15 PROCEDURE — 1159F MED LIST DOCD IN RCRD: CPT | Performed by: INTERNAL MEDICINE

## 2024-02-15 PROCEDURE — 1123F ACP DISCUSS/DSCN MKR DOCD: CPT | Performed by: INTERNAL MEDICINE

## 2024-02-15 PROCEDURE — 1170F FXNL STATUS ASSESSED: CPT | Performed by: INTERNAL MEDICINE

## 2024-02-15 ASSESSMENT — ACTIVITIES OF DAILY LIVING (ADL)
EATING: INDEPENDENT
GROOMING: NEEDS ASSISTANCE
TOILETING: INDEPENDENT
GROCERY_SHOPPING: NEEDS ASSISTANCE
HEARING - RIGHT EAR: FUNCTIONAL
ADEQUATE_TO_COMPLETE_ADL: YES
DOING HOUSEWORK: TOTAL CARE
WALKS IN HOME: NEEDS ASSISTANCE
PILL BOX USED: NO
MANAGING_FINANCES: NEEDS ASSISTANCE
BATHING: NEEDS ASSISTANCE
DRESSING: NEEDS ASSISTANCE
BATHING: NEEDS ASSISTANCE
USING TELEPHONE: INDEPENDENT
STIL DRIVING: NO
JUDGMENT_ADEQUATE_SAFELY_COMPLETE_DAILY_ACTIVITIES: YES
FEEDING: INDEPENDENT
JUDGMENT_ADEQUATE_SAFELY_COMPLETE_DAILY_ACTIVITIES: YES
FEEDING YOURSELF: INDEPENDENT
MANAGING FINANCES: NEEDS ASSISTANCE
PATIENT'S MEMORY ADEQUATE TO SAFELY COMPLETE DAILY ACTIVITIES?: YES
ADEQUATE_TO_COMPLETE_ADL: YES
TAKING MEDICATION: INDEPENDENT
USING TRANSPORTATION: TOTAL CARE
HEARING - LEFT EAR: FUNCTIONAL
DRESSING YOURSELF: NEEDS ASSISTANCE
TOILETING: INDEPENDENT
DRESSING: NEEDS ASSISTANCE
PREPARING MEALS: NEEDS ASSISTANCE
NEEDS ASSISTANCE WITH FOOD: INDEPENDENT
DOING_HOUSEWORK: TOTAL CARE
BATHING: INDEPENDENT
GROCERY SHOPPING: NEEDS ASSISTANCE
TAKING_MEDICATION: INDEPENDENT

## 2024-02-15 ASSESSMENT — ENCOUNTER SYMPTOMS
OCCASIONAL FEELINGS OF UNSTEADINESS: 0
DEPRESSION: 0
LOSS OF SENSATION IN FEET: 0

## 2024-02-15 ASSESSMENT — PATIENT HEALTH QUESTIONNAIRE - PHQ9
1. LITTLE INTEREST OR PLEASURE IN DOING THINGS: NOT AT ALL
2. FEELING DOWN, DEPRESSED OR HOPELESS: NOT AT ALL
2. FEELING DOWN, DEPRESSED OR HOPELESS: NOT AT ALL
SUM OF ALL RESPONSES TO PHQ9 QUESTIONS 1 AND 2: 0
1. LITTLE INTEREST OR PLEASURE IN DOING THINGS: NOT AT ALL
SUM OF ALL RESPONSES TO PHQ9 QUESTIONS 1 AND 2: 0

## 2024-02-15 ASSESSMENT — COLUMBIA-SUICIDE SEVERITY RATING SCALE - C-SSRS
1. IN THE PAST MONTH, HAVE YOU WISHED YOU WERE DEAD OR WISHED YOU COULD GO TO SLEEP AND NOT WAKE UP?: NO
2. HAVE YOU ACTUALLY HAD ANY THOUGHTS OF KILLING YOURSELF?: NO

## 2024-02-15 NOTE — PROGRESS NOTES
Subjective   Reason for Visit: Lukas Shen is an 73 y.o. male here for a Medicare Wellness visit.     Past Medical, Surgical, and Family History reviewed and updated in chart.    Reviewed all medications by prescribing practitioner or clinical pharmacist (such as prescriptions, OTCs, herbal therapies and supplements) and documented in the medical record.    73-year-old male patient was seen for annual wellness visit.  About 2 years ago he had a massive stroke which required mechanical thrombectomy, he has a totally occluded right carotid artery recently carotid Doppler was done and more than 70% disease was seen on the left side patient also has stenosis of proximal subclavian.  We have done CT angio last August for the same finding an actual plaque in the internal carotid artery was not 70%.  We have asked patient to be seen by vascular surgery which is happening and actually I ordered CT angiogram looking at carotid Doppler at this time without being aware that previous CT angio has been done less than 6 months ago so we will cancel her CT angio.  Patient is doing well, patient has excellent family support, he did not pass out, he is a former smoker, he is on full dose of statin, he is feeling well, very occasionally he will be lightheaded and dizzy, patient family needs to work on living will situation.  Patient is not having any ER visits or hospitalizations.        Patient Care Team:  Conor Roe MD as PCP - General  Conor Roe MD as PCP - [employer] PCP     Review of Systems  Constitutional: Negative.    HENT: Negative.     Eyes: Negative.    Respiratory: Negative.     Cardiovascular: Negative.  Negative for chest pain.   Gastrointestinal: Negative.    Musculoskeletal:  Positive for arthralgias.   Skin: Negative.    Neurological:  Positive for dizziness, and weakness. Negative for tremors.   Objective   Vitals:  /66 (BP Location: Right arm, Patient Position: Sitting, BP Cuff  "Size: Adult)   Pulse 71   Temp 35.7 °C (96.2 °F) (Temporal)   Ht 1.619 m (5' 3.75\")   Wt 77.6 kg (171 lb)   BMI 29.58 kg/m²       Physical Exam  Vitals reviewed.   Constitutional:       Appearance: Normal appearance. He is normal weight.   HENT:      Head: Normocephalic and atraumatic.   Eyes:      Conjunctiva/sclera: Conjunctivae normal.   Cardiovascular:      Rate and Rhythm: Normal rate and regular rhythm.      Pulses:           Carotid pulses are 1+ on the right side with bruit and 2+ on the left side.  Pulmonary:      Effort: Pulmonary effort is normal.      Breath sounds: Normal breath sounds.   Abdominal:      Palpations: Abdomen is soft.   Musculoskeletal:         General: Normal range of motion.      Cervical back: Normal range of motion.   Skin:     General: Skin is warm and dry.   Neurological:      General:  focal deficit present.      Motor: Weakness present.      Gait: Gait abnormal.   Psychiatric:         Mood and Affect: Mood normal.   Assessment/Plan   Problem List Items Addressed This Visit    None  Visit Diagnoses       Routine general medical examination at health care facility    -  Primary    Hemiplegia and hemiparesis following cerebral infarction affecting left non-dominant side (CMS/HCC)        Screening for colorectal cancer        Relevant Orders    Cologuard® colon cancer screening        I spent 5 minutes discussing advance care planning including the explanation and discussion of advance directives. If pt does not have currant up to date documents, examples and information provided on how to create both living will and power of .Patient was encouraged to work on completing these documents.  CT angio will not be done, vascular will decide about this a proximal subclavian stenosis.  There is a poor pulsation in the left upper extremity.  BP readings are reviewed, his living condition daily activities and disability reviewed.  No new changes, laboratories were supposed to be " done last time please proceed with it today.  All the vaccinations are up to date.  No fall, no depression, no confusion, he eats well, no change in the weight.  We will do Cologuard as time is coming up, wellness exam was completed, follow-up in 3 months.

## 2024-02-22 ENCOUNTER — APPOINTMENT (OUTPATIENT)
Dept: RADIOLOGY | Facility: HOSPITAL | Age: 74
End: 2024-02-22
Payer: MEDICARE

## 2024-02-23 DIAGNOSIS — Z01.818 PRE-OPERATIVE EXAM: ICD-10-CM

## 2024-02-23 DIAGNOSIS — I65.23 CAROTID STENOSIS, ASYMPTOMATIC, BILATERAL: ICD-10-CM

## 2024-03-07 ENCOUNTER — APPOINTMENT (OUTPATIENT)
Dept: VASCULAR SURGERY | Facility: CLINIC | Age: 74
End: 2024-03-07
Payer: MEDICARE

## 2024-03-08 ENCOUNTER — HOSPITAL ENCOUNTER (OUTPATIENT)
Dept: RADIOLOGY | Facility: HOSPITAL | Age: 74
Discharge: HOME | End: 2024-03-08
Payer: MEDICARE

## 2024-03-08 DIAGNOSIS — I65.23 CAROTID STENOSIS, ASYMPTOMATIC, BILATERAL: ICD-10-CM

## 2024-03-08 DIAGNOSIS — Z01.818 PRE-OPERATIVE EXAM: ICD-10-CM

## 2024-03-08 PROCEDURE — 70498 CT ANGIOGRAPHY NECK: CPT

## 2024-03-08 PROCEDURE — 2550000001 HC RX 255 CONTRASTS: Performed by: SURGERY

## 2024-03-08 PROCEDURE — 70498 CT ANGIOGRAPHY NECK: CPT | Performed by: RADIOLOGY

## 2024-03-08 PROCEDURE — 70496 CT ANGIOGRAPHY HEAD: CPT | Performed by: RADIOLOGY

## 2024-03-08 RX ADMIN — IOHEXOL 75 ML: 350 INJECTION, SOLUTION INTRAVENOUS at 09:05

## 2024-03-14 ENCOUNTER — OFFICE VISIT (OUTPATIENT)
Dept: VASCULAR SURGERY | Facility: CLINIC | Age: 74
End: 2024-03-14
Payer: MEDICARE

## 2024-03-14 VITALS
BODY MASS INDEX: 24.08 KG/M2 | HEART RATE: 80 BPM | WEIGHT: 172 LBS | TEMPERATURE: 97.1 F | OXYGEN SATURATION: 95 % | DIASTOLIC BLOOD PRESSURE: 67 MMHG | RESPIRATION RATE: 16 BRPM | SYSTOLIC BLOOD PRESSURE: 124 MMHG | HEIGHT: 71 IN

## 2024-03-14 DIAGNOSIS — I65.22 STENOSIS OF LEFT CAROTID ARTERY: Primary | ICD-10-CM

## 2024-03-14 DIAGNOSIS — I65.23 CAROTID STENOSIS, ASYMPTOMATIC, BILATERAL: ICD-10-CM

## 2024-03-14 DIAGNOSIS — I65.21 CAROTID OCCLUSION, RIGHT: ICD-10-CM

## 2024-03-14 PROCEDURE — 1123F ACP DISCUSS/DSCN MKR DOCD: CPT | Performed by: SURGERY

## 2024-03-14 PROCEDURE — 1036F TOBACCO NON-USER: CPT | Performed by: SURGERY

## 2024-03-14 PROCEDURE — 99215 OFFICE O/P EST HI 40 MIN: CPT | Performed by: SURGERY

## 2024-03-14 PROCEDURE — 3074F SYST BP LT 130 MM HG: CPT | Performed by: SURGERY

## 2024-03-14 PROCEDURE — 1159F MED LIST DOCD IN RCRD: CPT | Performed by: SURGERY

## 2024-03-14 PROCEDURE — 1160F RVW MEDS BY RX/DR IN RCRD: CPT | Performed by: SURGERY

## 2024-03-14 PROCEDURE — 3078F DIAST BP <80 MM HG: CPT | Performed by: SURGERY

## 2024-03-14 NOTE — PROGRESS NOTES
Vascular Surgery Clinic Note    CC: carotid    HPI:  Lukas Shen is 73 y.o. male with history of right ICA occlusion and stroke affecting left arm/leg. He is able to walk with a cane. He quit smoking a few years ago. He has known left ICA disease. Duplex suggested worsening stenosis recently, to >80% on the left. A CTA was performed that clearly shows about 50% left ICA stenosis. He has not had recent stroke.     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    Meds:   Current Outpatient Medications on File Prior to Visit   Medication Sig Dispense Refill    albuterol 90 mcg/actuation inhaler Inhale.      aspirin 81 mg chewable tablet Chew.      atorvastatin (Lipitor) 80 mg tablet Take 1 tablet (80 mg) by mouth once daily. 90 tablet 3    clopidogrel (Plavix) 75 mg tablet Take 1 tablet (75 mg) by mouth once daily. 90 tablet 3    escitalopram (Lexapro) 10 mg tablet Take 1 tablet (10 mg) by mouth once daily. 90 tablet 3    esomeprazole (NexIUM) 40 mg DR capsule Take 1 capsule (40 mg) by mouth once daily. 90 capsule 3     No current facility-administered medications on file prior to visit.        Allergies:   No Known Allergies    SH:    Social Determinants of Health     Tobacco Use: Medium Risk (3/14/2024)    Patient History     Smoking Tobacco Use: Former     Smokeless Tobacco Use: Never     Passive Exposure: Not on file   Alcohol Use: Not on file   Financial Resource Strain: Not on file   Food Insecurity: Not on file   Transportation Needs: Not on file   Physical Activity: Not on file   Stress: Not on file   Social Connections: Not on file   Intimate Partner Violence: Not on file   Depression: Not at risk (2/15/2024)    PHQ-2     PHQ-2 Score: 0   Housing Stability: Not on file   Utilities: Not on file   Digital Equity: Not on file        FH:  No family history on file.     ROS:  All systems were reviewed and are negative except as per  HPI.    Objective:  Vitals:  Vitals:    03/14/24 0956   BP: 124/67   Pulse: 80   Resp: 16   Temp: 36.2 °C (97.1 °F)   SpO2: 95%        Exam:  In NAD, well appearing  Abd Soft, ND/NT  Vascular examination:  Ambulating with a cane. Left arm weakness compared to right, but functional.    Assessment & Plan:  Lukas Shen is 73 y.o. male with stable L ICA stenosis about 50%, similar to CTA 6 months ago. He does not have severe tortuosity, I am not sure why the duplex is overcalling the stenosis, perhaps related to significant calcification. Continue best medical therapy, which he is already on. Continue yearly carotid surveillance.      I spent a total of 60 minutes on the day of the visit.         Maurice Jaquez M.D.

## 2024-05-30 ENCOUNTER — APPOINTMENT (OUTPATIENT)
Dept: PRIMARY CARE | Facility: CLINIC | Age: 74
End: 2024-05-30
Payer: MEDICARE

## 2024-06-26 ENCOUNTER — HOSPITAL ENCOUNTER (INPATIENT)
Facility: HOSPITAL | Age: 74
LOS: 2 days | Discharge: HOME | End: 2024-06-28
Attending: INTERNAL MEDICINE | Admitting: STUDENT IN AN ORGANIZED HEALTH CARE EDUCATION/TRAINING PROGRAM
Payer: MEDICARE

## 2024-06-26 ENCOUNTER — APPOINTMENT (OUTPATIENT)
Dept: RADIOLOGY | Facility: HOSPITAL | Age: 74
End: 2024-06-26
Payer: MEDICARE

## 2024-06-26 ENCOUNTER — APPOINTMENT (OUTPATIENT)
Dept: CARDIOLOGY | Facility: HOSPITAL | Age: 74
End: 2024-06-26
Payer: MEDICARE

## 2024-06-26 DIAGNOSIS — G45.9 TIA (TRANSIENT ISCHEMIC ATTACK): ICD-10-CM

## 2024-06-26 DIAGNOSIS — R29.898 LEFT LEG WEAKNESS: ICD-10-CM

## 2024-06-26 DIAGNOSIS — U07.1 COVID-19: Primary | ICD-10-CM

## 2024-06-26 DIAGNOSIS — Z86.73 HISTORY OF EMBOLIC STROKE: ICD-10-CM

## 2024-06-26 DIAGNOSIS — R55 SYNCOPE AND COLLAPSE: ICD-10-CM

## 2024-06-26 DIAGNOSIS — R55 SYNCOPE, UNSPECIFIED SYNCOPE TYPE: ICD-10-CM

## 2024-06-26 PROBLEM — J69.0 ASPIRATION PNEUMONIA OF RIGHT LOWER LOBE (MULTI): Status: ACTIVE | Noted: 2024-06-26

## 2024-06-26 LAB
ALBUMIN SERPL BCP-MCNC: 4 G/DL (ref 3.4–5)
ALP SERPL-CCNC: 113 U/L (ref 33–136)
ALT SERPL W P-5'-P-CCNC: 11 U/L (ref 10–52)
ANION GAP SERPL CALC-SCNC: 11 MMOL/L (ref 10–20)
APTT PPP: 29 SECONDS (ref 27–38)
AST SERPL W P-5'-P-CCNC: 13 U/L (ref 9–39)
BASE EXCESS BLDV CALC-SCNC: 5.1 MMOL/L (ref -2–3)
BASOPHILS # BLD AUTO: 0.04 X10*3/UL (ref 0–0.1)
BASOPHILS NFR BLD AUTO: 0.3 %
BILIRUB SERPL-MCNC: 0.6 MG/DL (ref 0–1.2)
BNP SERPL-MCNC: 26 PG/ML (ref 0–99)
BODY TEMPERATURE: ABNORMAL
BUN SERPL-MCNC: 18 MG/DL (ref 6–23)
CALCIUM SERPL-MCNC: 9.1 MG/DL (ref 8.6–10.3)
CARDIAC TROPONIN I PNL SERPL HS: 5 NG/L (ref 0–20)
CARDIAC TROPONIN I PNL SERPL HS: 7 NG/L (ref 0–20)
CHLORIDE SERPL-SCNC: 103 MMOL/L (ref 98–107)
CO2 SERPL-SCNC: 26 MMOL/L (ref 21–32)
CREAT SERPL-MCNC: 1.35 MG/DL (ref 0.5–1.3)
EGFRCR SERPLBLD CKD-EPI 2021: 55 ML/MIN/1.73M*2
EOSINOPHIL # BLD AUTO: 0.02 X10*3/UL (ref 0–0.4)
EOSINOPHIL NFR BLD AUTO: 0.2 %
ERYTHROCYTE [DISTWIDTH] IN BLOOD BY AUTOMATED COUNT: 12.7 % (ref 11.5–14.5)
FLUAV RNA RESP QL NAA+PROBE: NOT DETECTED
FLUBV RNA RESP QL NAA+PROBE: NOT DETECTED
GLUCOSE SERPL-MCNC: 109 MG/DL (ref 74–99)
HCO3 BLDV-SCNC: 30.5 MMOL/L (ref 22–26)
HCT VFR BLD AUTO: 42.4 % (ref 41–52)
HGB BLD-MCNC: 14.5 G/DL (ref 13.5–17.5)
IMM GRANULOCYTES # BLD AUTO: 0.04 X10*3/UL (ref 0–0.5)
IMM GRANULOCYTES NFR BLD AUTO: 0.3 % (ref 0–0.9)
INHALED O2 CONCENTRATION: 24 %
INR PPP: 1.1 (ref 0.9–1.1)
LACTATE SERPL-SCNC: 1.5 MMOL/L (ref 0.4–2)
LIPASE SERPL-CCNC: 21 U/L (ref 9–82)
LYMPHOCYTES # BLD AUTO: 0.45 X10*3/UL (ref 0.8–3)
LYMPHOCYTES NFR BLD AUTO: 3.5 %
MAGNESIUM SERPL-MCNC: 1.82 MG/DL (ref 1.6–2.4)
MCH RBC QN AUTO: 31.3 PG (ref 26–34)
MCHC RBC AUTO-ENTMCNC: 34.2 G/DL (ref 32–36)
MCV RBC AUTO: 91 FL (ref 80–100)
MONOCYTES # BLD AUTO: 0.58 X10*3/UL (ref 0.05–0.8)
MONOCYTES NFR BLD AUTO: 4.5 %
NEUTROPHILS # BLD AUTO: 11.66 X10*3/UL (ref 1.6–5.5)
NEUTROPHILS NFR BLD AUTO: 91.2 %
NRBC BLD-RTO: 0 /100 WBCS (ref 0–0)
OXYHGB MFR BLDV: 57.7 % (ref 45–75)
PCO2 BLDV: 47 MM HG (ref 41–51)
PH BLDV: 7.42 PH (ref 7.33–7.43)
PLATELET # BLD AUTO: 230 X10*3/UL (ref 150–450)
PO2 BLDV: 37 MM HG (ref 35–45)
POTASSIUM SERPL-SCNC: 4.1 MMOL/L (ref 3.5–5.3)
PROT SERPL-MCNC: 7.1 G/DL (ref 6.4–8.2)
PROTHROMBIN TIME: 12.5 SECONDS (ref 9.8–12.8)
RBC # BLD AUTO: 4.64 X10*6/UL (ref 4.5–5.9)
SAO2 % BLDV: 59 % (ref 45–75)
SARS-COV-2 RNA RESP QL NAA+PROBE: DETECTED
SODIUM SERPL-SCNC: 136 MMOL/L (ref 136–145)
WBC # BLD AUTO: 12.8 X10*3/UL (ref 4.4–11.3)

## 2024-06-26 PROCEDURE — 87636 SARSCOV2 & INF A&B AMP PRB: CPT | Performed by: INTERNAL MEDICINE

## 2024-06-26 PROCEDURE — 93005 ELECTROCARDIOGRAM TRACING: CPT

## 2024-06-26 PROCEDURE — 70450 CT HEAD/BRAIN W/O DYE: CPT

## 2024-06-26 PROCEDURE — 74177 CT ABD & PELVIS W/CONTRAST: CPT

## 2024-06-26 PROCEDURE — 2500000004 HC RX 250 GENERAL PHARMACY W/ HCPCS (ALT 636 FOR OP/ED): Performed by: INTERNAL MEDICINE

## 2024-06-26 PROCEDURE — 83690 ASSAY OF LIPASE: CPT | Performed by: INTERNAL MEDICINE

## 2024-06-26 PROCEDURE — 70450 CT HEAD/BRAIN W/O DYE: CPT | Performed by: RADIOLOGY

## 2024-06-26 PROCEDURE — 74177 CT ABD & PELVIS W/CONTRAST: CPT | Performed by: RADIOLOGY

## 2024-06-26 PROCEDURE — 80053 COMPREHEN METABOLIC PANEL: CPT | Performed by: INTERNAL MEDICINE

## 2024-06-26 PROCEDURE — 84484 ASSAY OF TROPONIN QUANT: CPT | Performed by: INTERNAL MEDICINE

## 2024-06-26 PROCEDURE — 99223 1ST HOSP IP/OBS HIGH 75: CPT | Performed by: INTERNAL MEDICINE

## 2024-06-26 PROCEDURE — 71275 CT ANGIOGRAPHY CHEST: CPT | Performed by: RADIOLOGY

## 2024-06-26 PROCEDURE — 83880 ASSAY OF NATRIURETIC PEPTIDE: CPT | Performed by: INTERNAL MEDICINE

## 2024-06-26 PROCEDURE — 83735 ASSAY OF MAGNESIUM: CPT | Performed by: INTERNAL MEDICINE

## 2024-06-26 PROCEDURE — 36415 COLL VENOUS BLD VENIPUNCTURE: CPT | Performed by: INTERNAL MEDICINE

## 2024-06-26 PROCEDURE — 85730 THROMBOPLASTIN TIME PARTIAL: CPT | Performed by: INTERNAL MEDICINE

## 2024-06-26 PROCEDURE — 2500000001 HC RX 250 WO HCPCS SELF ADMINISTERED DRUGS (ALT 637 FOR MEDICARE OP): Performed by: INTERNAL MEDICINE

## 2024-06-26 PROCEDURE — 84484 ASSAY OF TROPONIN QUANT: CPT | Mod: 91 | Performed by: INTERNAL MEDICINE

## 2024-06-26 PROCEDURE — 85025 COMPLETE CBC W/AUTO DIFF WBC: CPT | Performed by: INTERNAL MEDICINE

## 2024-06-26 PROCEDURE — 96374 THER/PROPH/DIAG INJ IV PUSH: CPT | Mod: 59

## 2024-06-26 PROCEDURE — 2550000001 HC RX 255 CONTRASTS: Performed by: INTERNAL MEDICINE

## 2024-06-26 PROCEDURE — 85610 PROTHROMBIN TIME: CPT | Performed by: INTERNAL MEDICINE

## 2024-06-26 PROCEDURE — 99285 EMERGENCY DEPT VISIT HI MDM: CPT | Mod: 25

## 2024-06-26 PROCEDURE — 82805 BLOOD GASES W/O2 SATURATION: CPT | Performed by: INTERNAL MEDICINE

## 2024-06-26 PROCEDURE — 71275 CT ANGIOGRAPHY CHEST: CPT

## 2024-06-26 PROCEDURE — 83605 ASSAY OF LACTIC ACID: CPT | Performed by: INTERNAL MEDICINE

## 2024-06-26 PROCEDURE — 96361 HYDRATE IV INFUSION ADD-ON: CPT

## 2024-06-26 PROCEDURE — 1200000002 HC GENERAL ROOM WITH TELEMETRY DAILY

## 2024-06-26 PROCEDURE — 96375 TX/PRO/DX INJ NEW DRUG ADDON: CPT

## 2024-06-26 RX ORDER — NAPROXEN SODIUM 220 MG/1
81 TABLET, FILM COATED ORAL DAILY
Status: DISCONTINUED | OUTPATIENT
Start: 2024-06-27 | End: 2024-06-28 | Stop reason: HOSPADM

## 2024-06-26 RX ORDER — FAMOTIDINE 10 MG/ML
20 INJECTION INTRAVENOUS ONCE
Status: COMPLETED | OUTPATIENT
Start: 2024-06-26 | End: 2024-06-26

## 2024-06-26 RX ORDER — POLYETHYLENE GLYCOL 3350 17 G/17G
17 POWDER, FOR SOLUTION ORAL DAILY PRN
Status: DISCONTINUED | OUTPATIENT
Start: 2024-06-26 | End: 2024-06-28 | Stop reason: HOSPADM

## 2024-06-26 RX ORDER — ONDANSETRON HYDROCHLORIDE 2 MG/ML
4 INJECTION, SOLUTION INTRAVENOUS EVERY 8 HOURS PRN
Status: DISCONTINUED | OUTPATIENT
Start: 2024-06-26 | End: 2024-06-28 | Stop reason: HOSPADM

## 2024-06-26 RX ORDER — PANTOPRAZOLE SODIUM 40 MG/10ML
40 INJECTION, POWDER, LYOPHILIZED, FOR SOLUTION INTRAVENOUS DAILY
Status: DISCONTINUED | OUTPATIENT
Start: 2024-06-27 | End: 2024-06-28 | Stop reason: HOSPADM

## 2024-06-26 RX ORDER — TALC
3 POWDER (GRAM) TOPICAL NIGHTLY PRN
Status: DISCONTINUED | OUTPATIENT
Start: 2024-06-26 | End: 2024-06-28 | Stop reason: HOSPADM

## 2024-06-26 RX ORDER — ACETAMINOPHEN 325 MG/1
975 TABLET ORAL ONCE
Status: COMPLETED | OUTPATIENT
Start: 2024-06-26 | End: 2024-06-26

## 2024-06-26 RX ORDER — ATORVASTATIN CALCIUM 80 MG/1
80 TABLET, FILM COATED ORAL NIGHTLY
Status: DISCONTINUED | OUTPATIENT
Start: 2024-06-26 | End: 2024-06-28 | Stop reason: HOSPADM

## 2024-06-26 RX ORDER — CLOPIDOGREL BISULFATE 75 MG/1
75 TABLET ORAL DAILY
Status: DISCONTINUED | OUTPATIENT
Start: 2024-06-27 | End: 2024-06-28 | Stop reason: HOSPADM

## 2024-06-26 RX ORDER — ONDANSETRON HYDROCHLORIDE 2 MG/ML
4 INJECTION, SOLUTION INTRAVENOUS ONCE
Status: COMPLETED | OUTPATIENT
Start: 2024-06-26 | End: 2024-06-26

## 2024-06-26 RX ORDER — ACETAMINOPHEN 325 MG/1
650 TABLET ORAL EVERY 4 HOURS PRN
Status: DISCONTINUED | OUTPATIENT
Start: 2024-06-26 | End: 2024-06-28 | Stop reason: HOSPADM

## 2024-06-26 RX ORDER — ESCITALOPRAM OXALATE 10 MG/1
10 TABLET ORAL DAILY
Status: DISCONTINUED | OUTPATIENT
Start: 2024-06-27 | End: 2024-06-28 | Stop reason: HOSPADM

## 2024-06-26 RX ORDER — ONDANSETRON 4 MG/1
4 TABLET, FILM COATED ORAL EVERY 8 HOURS PRN
Status: DISCONTINUED | OUTPATIENT
Start: 2024-06-26 | End: 2024-06-28 | Stop reason: HOSPADM

## 2024-06-26 RX ORDER — ACETAMINOPHEN 650 MG/1
650 SUPPOSITORY RECTAL EVERY 4 HOURS PRN
Status: DISCONTINUED | OUTPATIENT
Start: 2024-06-26 | End: 2024-06-28 | Stop reason: HOSPADM

## 2024-06-26 RX ORDER — ACETAMINOPHEN 160 MG/5ML
650 SOLUTION ORAL EVERY 4 HOURS PRN
Status: DISCONTINUED | OUTPATIENT
Start: 2024-06-26 | End: 2024-06-28 | Stop reason: HOSPADM

## 2024-06-26 RX ORDER — PANTOPRAZOLE SODIUM 40 MG/1
40 TABLET, DELAYED RELEASE ORAL DAILY
Status: DISCONTINUED | OUTPATIENT
Start: 2024-06-27 | End: 2024-06-28 | Stop reason: HOSPADM

## 2024-06-26 SDOH — SOCIAL STABILITY: SOCIAL INSECURITY: DO YOU FEEL UNSAFE GOING BACK TO THE PLACE WHERE YOU ARE LIVING?: NO

## 2024-06-26 SDOH — SOCIAL STABILITY: SOCIAL INSECURITY: WERE YOU ABLE TO COMPLETE ALL THE BEHAVIORAL HEALTH SCREENINGS?: YES

## 2024-06-26 SDOH — SOCIAL STABILITY: SOCIAL INSECURITY: DO YOU FEEL ANYONE HAS EXPLOITED OR TAKEN ADVANTAGE OF YOU FINANCIALLY OR OF YOUR PERSONAL PROPERTY?: NO

## 2024-06-26 SDOH — SOCIAL STABILITY: SOCIAL INSECURITY: ARE THERE ANY APPARENT SIGNS OF INJURIES/BEHAVIORS THAT COULD BE RELATED TO ABUSE/NEGLECT?: NO

## 2024-06-26 SDOH — SOCIAL STABILITY: SOCIAL INSECURITY: DOES ANYONE TRY TO KEEP YOU FROM HAVING/CONTACTING OTHER FRIENDS OR DOING THINGS OUTSIDE YOUR HOME?: NO

## 2024-06-26 SDOH — SOCIAL STABILITY: SOCIAL INSECURITY: HAS ANYONE EVER THREATENED TO HURT YOUR FAMILY OR YOUR PETS?: NO

## 2024-06-26 SDOH — SOCIAL STABILITY: SOCIAL INSECURITY: ARE YOU OR HAVE YOU BEEN THREATENED OR ABUSED PHYSICALLY, EMOTIONALLY, OR SEXUALLY BY ANYONE?: NO

## 2024-06-26 SDOH — SOCIAL STABILITY: SOCIAL INSECURITY: HAVE YOU HAD THOUGHTS OF HARMING ANYONE ELSE?: YES

## 2024-06-26 SDOH — SOCIAL STABILITY: SOCIAL INSECURITY: HAVE YOU HAD ANY THOUGHTS OF HARMING ANYONE ELSE?: NO

## 2024-06-26 SDOH — SOCIAL STABILITY: SOCIAL INSECURITY: ABUSE: ADULT

## 2024-06-26 ASSESSMENT — ACTIVITIES OF DAILY LIVING (ADL)
BATHING: NEEDS ASSISTANCE
JUDGMENT_ADEQUATE_SAFELY_COMPLETE_DAILY_ACTIVITIES: YES
HEARING - LEFT EAR: FUNCTIONAL
ADEQUATE_TO_COMPLETE_ADL: YES
HEARING - RIGHT EAR: FUNCTIONAL
TOILETING: NEEDS ASSISTANCE
LACK_OF_TRANSPORTATION: NO
FEEDING YOURSELF: NEEDS ASSISTANCE
GROOMING: NEEDS ASSISTANCE
DRESSING YOURSELF: NEEDS ASSISTANCE

## 2024-06-26 ASSESSMENT — LIFESTYLE VARIABLES
SKIP TO QUESTIONS 9-10: 1
TOTAL SCORE: 0
EVER HAD A DRINK FIRST THING IN THE MORNING TO STEADY YOUR NERVES TO GET RID OF A HANGOVER: NO
AUDIT-C TOTAL SCORE: 0
AUDIT-C TOTAL SCORE: 0
HOW OFTEN DO YOU HAVE A DRINK CONTAINING ALCOHOL: NEVER
EVER FELT BAD OR GUILTY ABOUT YOUR DRINKING: NO
HOW OFTEN DO YOU HAVE 6 OR MORE DRINKS ON ONE OCCASION: NEVER
HAVE YOU EVER FELT YOU SHOULD CUT DOWN ON YOUR DRINKING: NO
HAVE PEOPLE ANNOYED YOU BY CRITICIZING YOUR DRINKING: NO
HOW MANY STANDARD DRINKS CONTAINING ALCOHOL DO YOU HAVE ON A TYPICAL DAY: PATIENT DOES NOT DRINK

## 2024-06-26 ASSESSMENT — COLUMBIA-SUICIDE SEVERITY RATING SCALE - C-SSRS
6. HAVE YOU EVER DONE ANYTHING, STARTED TO DO ANYTHING, OR PREPARED TO DO ANYTHING TO END YOUR LIFE?: NO
1. IN THE PAST MONTH, HAVE YOU WISHED YOU WERE DEAD OR WISHED YOU COULD GO TO SLEEP AND NOT WAKE UP?: NO
2. HAVE YOU ACTUALLY HAD ANY THOUGHTS OF KILLING YOURSELF?: NO

## 2024-06-26 ASSESSMENT — PATIENT HEALTH QUESTIONNAIRE - PHQ9
1. LITTLE INTEREST OR PLEASURE IN DOING THINGS: NOT AT ALL
2. FEELING DOWN, DEPRESSED OR HOPELESS: NOT AT ALL
SUM OF ALL RESPONSES TO PHQ9 QUESTIONS 1 & 2: 0

## 2024-06-26 ASSESSMENT — PAIN SCALES - GENERAL
PAINLEVEL_OUTOF10: 2
PAINLEVEL_OUTOF10: 0 - NO PAIN

## 2024-06-26 ASSESSMENT — PAIN - FUNCTIONAL ASSESSMENT: PAIN_FUNCTIONAL_ASSESSMENT: 0-10

## 2024-06-26 NOTE — ED PROVIDER NOTES
"HPI   Chief Complaint   Patient presents with    multiple medical complaints     EMS was called to house because wife states pt had possible stroke. Pt \"went unresponsive for 3 minutes then came back to\". Pt complaining of abdominal pain and coughing.        Patient presented for evaluation of abdominal pain.  Patient states he had abdominal pain starting last evening.  Patient feels somewhat nauseous this time.  Denies diarrhea.  Patient notes he also start developing a cough.  Patient notes he started coughing yesterday.  Patient is coughing up clear sputum.  Today patient had an episode where he went unresponsive briefly per EMS.  EMS was told by the patient's wife that he briefly lost consciousness.  Patient no fall.  Patient does have history of previous stroke.  No new neurodeficit.  No new weakness per patient.    Patient has a history of previous stroke that left him with left-sided deficits.  Patient takes Plavix.      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  Social History     Tobacco Use    Smoking status: Former     Current packs/day: 0.00     Types: Cigarettes     Quit date: 2018     Years since quittin.4    Smokeless tobacco: Never   Vaping Use    Vaping status: Never Used   Substance Use Topics    Alcohol use: Not Currently    Drug use: Never       Physical Exam   ED Triage Vitals [24 1523] "   Temp Heart Rate Respirations BP   -- 92 16 113/65      Pulse Ox Temp Source Heart Rate Source Patient Position   (!) 91 % Temporal Monitor Sitting      BP Location FiO2 (%)     Right arm --       Physical Exam  Vitals and nursing note reviewed.   Constitutional:       Appearance: Normal appearance.   HENT:      Head: Atraumatic.      Right Ear: External ear normal.      Left Ear: External ear normal.      Nose: Nose normal.      Mouth/Throat:      Mouth: Mucous membranes are moist.      Pharynx: Oropharynx is clear.   Eyes:      Extraocular Movements: Extraocular movements intact.      Pupils: Pupils are equal, round, and reactive to light.   Cardiovascular:      Rate and Rhythm: Normal rate and regular rhythm.      Pulses: Normal pulses.   Pulmonary:      Effort: Pulmonary effort is normal.      Breath sounds: Examination of the left-lower field reveals decreased breath sounds. Decreased breath sounds present.   Abdominal:      Palpations: Abdomen is soft.      Tenderness: There is abdominal tenderness in the epigastric area. There is no right CVA tenderness or left CVA tenderness.   Musculoskeletal:         General: No tenderness or signs of injury. Normal range of motion.      Cervical back: Normal range of motion and neck supple. No rigidity or tenderness.   Skin:     General: Skin is warm and dry.   Neurological:      General: No focal deficit present.      Mental Status: He is alert and oriented to person, place, and time. Mental status is at baseline.      Cranial Nerves: Facial asymmetry present.      Motor: Weakness present.      Comments: Left facial asymmetry, left arm drift, left leg drift.  Patient indicates this is chronic since previous stroke.    NIH 0.   Psychiatric:         Mood and Affect: Mood normal.         Behavior: Behavior normal.         ED Course & MDM   ED Course as of 06/28/24 2120 Wed Jun 26, 2024   [id number] Family is now at bedside.  They indicate that the patient had an episode today  where he did not want to eat lunch because he did not have an appetite.  Patient went to use the restroom and appeared somewhat disoriented.  Patient was dragging his left leg.  Patient does have deficits from previous stroke although the left leg normally does not drag.  I reevaluated the patient's legs in front of the patient's wife she indicates this is currently his baseline.  Leg weakness is resolved.  She notes he was then sitting in the motorized chair he is at home and he went unresponsive where his tongue was hanging out of his mouth.  She states it lasted couple of minutes. [JA]   1635 Coronavirus 2019, PCR(!): Detected [JA]   1902 Discussed with Dr. Bliss and he accepts patient to his service. [JA]      ED Course User Index  [JA] Braydon Murphy DO         Diagnoses as of 06/28/24 2120   COVID-19   TIA (transient ischemic attack)   Left leg weakness   Syncope, unspecified syncope type       Medical Decision Making  Differential diagnosis: Pneumonia, PE, MI, sepsis, gastritis, gastroenteritis, viral illness, stroke, other    Initially presenting for evaluation of loss of consciousness.  On initial report from EMS patient had no neurodeficit that was new.  Patient has left-sided weakness that is from a previous CVA.  However on exam patient is back to his baseline per patient.  Patient had a syncopal episode.  No seizure-like activity.  No new focal neurodeficit on exam.  Patient's wife is now available and she indicates the patient was dragging his leg which is different for him.  Not have concern for TIA.  As previously discussed patient has no new neuro deficit on this exam thus NIH is 0.  Patient is not a candidate for TNK or thrombectomy at this time.  CT head negative.  Patient found to have borderline hypoxia on initial exam.  Viral studies showed positive for COVID-19.  CT scan showing questionable aspiration.  Patient admitted for further evaluation        Procedure  ECG 12 lead    Performed by:  Braydon Murphy DO  Authorized by: Braydon Murphy DO    ECG interpreted by ED Physician in the absence of a cardiologist: yes    Comments:      6/26/2024 16: 25 sinus rhythm, rate 93, normal axis, ST segments normal, T waves normal, normal EKG.  EKG interpreted by myself       Braydon Murphy DO  06/28/24 2120

## 2024-06-26 NOTE — PROGRESS NOTES
Respiratory Therapy Note  VBG results     Latest Reference Range & Units 06/26/24 15:54   POCT pH, Venous 7.33 - 7.43 pH 7.42   POCT pCO2, Venous 41 - 51 mm Hg 47   POCT pO2, Venous 35 - 45 mm Hg 37   POCT SO2, Venous 45 - 75 % 59   POCT Oxy Hemoglobin, Venous 45.0 - 75.0 % 57.7   POCT Base Excess, Venous -2.0 - 3.0 mmol/L 5.1 (H)   POCT HCO3 Calculated, Venous 22.0 - 26.0 mmol/L 30.5 (H)   FiO2 % 24   (H): Data is abnormally high

## 2024-06-27 ENCOUNTER — APPOINTMENT (OUTPATIENT)
Dept: RADIOLOGY | Facility: HOSPITAL | Age: 74
End: 2024-06-27
Payer: MEDICARE

## 2024-06-27 ENCOUNTER — APPOINTMENT (OUTPATIENT)
Dept: CARDIOLOGY | Facility: HOSPITAL | Age: 74
End: 2024-06-27
Payer: MEDICARE

## 2024-06-27 LAB
ALBUMIN SERPL BCP-MCNC: 3.7 G/DL (ref 3.4–5)
ALP SERPL-CCNC: 95 U/L (ref 33–136)
ALT SERPL W P-5'-P-CCNC: 8 U/L (ref 10–52)
ANION GAP SERPL CALC-SCNC: 13 MMOL/L (ref 10–20)
AORTIC VALVE MEAN GRADIENT: 6 MMHG
AORTIC VALVE PEAK VELOCITY: 1.49 M/S
AST SERPL W P-5'-P-CCNC: 15 U/L (ref 9–39)
ATRIAL RATE: 81 BPM
AV PEAK GRADIENT: 8.9 MMHG
AVA (PEAK VEL): 3.86 CM2
AVA (VTI): 3.54 CM2
BILIRUB SERPL-MCNC: 0.7 MG/DL (ref 0–1.2)
BUN SERPL-MCNC: 16 MG/DL (ref 6–23)
CALCIUM SERPL-MCNC: 8.9 MG/DL (ref 8.6–10.3)
CHLORIDE SERPL-SCNC: 104 MMOL/L (ref 98–107)
CO2 SERPL-SCNC: 24 MMOL/L (ref 21–32)
CREAT SERPL-MCNC: 1.36 MG/DL (ref 0.5–1.3)
EGFRCR SERPLBLD CKD-EPI 2021: 55 ML/MIN/1.73M*2
EJECTION FRACTION APICAL 4 CHAMBER: 60.6
EJECTION FRACTION: 58 %
ERYTHROCYTE [DISTWIDTH] IN BLOOD BY AUTOMATED COUNT: 12.5 % (ref 11.5–14.5)
GLUCOSE SERPL-MCNC: 101 MG/DL (ref 74–99)
HCT VFR BLD AUTO: 42 % (ref 41–52)
HGB BLD-MCNC: 14 G/DL (ref 13.5–17.5)
HOLD SPECIMEN: NORMAL
LEFT ATRIUM VOLUME AREA LENGTH INDEX BSA: 11.9 ML/M2
LEFT VENTRICLE INTERNAL DIMENSION DIASTOLE: 4.1 CM (ref 3.5–6)
LEFT VENTRICULAR OUTFLOW TRACT DIAMETER: 2.24 CM
LV EJECTION FRACTION BIPLANE: 60 %
MAGNESIUM SERPL-MCNC: 1.93 MG/DL (ref 1.6–2.4)
MCH RBC QN AUTO: 31.5 PG (ref 26–34)
MCHC RBC AUTO-ENTMCNC: 33.3 G/DL (ref 32–36)
MCV RBC AUTO: 94 FL (ref 80–100)
MITRAL VALVE E/A RATIO: 1.13
NRBC BLD-RTO: 0 /100 WBCS (ref 0–0)
P AXIS: 56 DEGREES
P OFFSET: 185 MS
P ONSET: 136 MS
PLATELET # BLD AUTO: 206 X10*3/UL (ref 150–450)
POTASSIUM SERPL-SCNC: 4.4 MMOL/L (ref 3.5–5.3)
PR INTERVAL: 178 MS
PROT SERPL-MCNC: 6.6 G/DL (ref 6.4–8.2)
Q ONSET: 225 MS
QRS COUNT: 13 BEATS
QRS DURATION: 92 MS
QT INTERVAL: 354 MS
QTC CALCULATION(BAZETT): 411 MS
QTC FREDERICIA: 391 MS
R AXIS: -3 DEGREES
RBC # BLD AUTO: 4.45 X10*6/UL (ref 4.5–5.9)
RIGHT VENTRICLE FREE WALL PEAK S': 14.3 CM/S
SODIUM SERPL-SCNC: 137 MMOL/L (ref 136–145)
T AXIS: 63 DEGREES
T OFFSET: 402 MS
TRICUSPID ANNULAR PLANE SYSTOLIC EXCURSION: 2.4 CM
VENTRICULAR RATE: 81 BPM
WBC # BLD AUTO: 10.2 X10*3/UL (ref 4.4–11.3)

## 2024-06-27 PROCEDURE — 3E0DX3Z INTRODUCTION OF ANTI-INFLAMMATORY INTO MOUTH AND PHARYNX, EXTERNAL APPROACH: ICD-10-PCS | Performed by: STUDENT IN AN ORGANIZED HEALTH CARE EDUCATION/TRAINING PROGRAM

## 2024-06-27 PROCEDURE — 99222 1ST HOSP IP/OBS MODERATE 55: CPT

## 2024-06-27 PROCEDURE — 93306 TTE W/DOPPLER COMPLETE: CPT

## 2024-06-27 PROCEDURE — 2500000001 HC RX 250 WO HCPCS SELF ADMINISTERED DRUGS (ALT 637 FOR MEDICARE OP): Performed by: INTERNAL MEDICINE

## 2024-06-27 PROCEDURE — 70551 MRI BRAIN STEM W/O DYE: CPT | Performed by: STUDENT IN AN ORGANIZED HEALTH CARE EDUCATION/TRAINING PROGRAM

## 2024-06-27 PROCEDURE — 36415 COLL VENOUS BLD VENIPUNCTURE: CPT | Performed by: INTERNAL MEDICINE

## 2024-06-27 PROCEDURE — 2500000004 HC RX 250 GENERAL PHARMACY W/ HCPCS (ALT 636 FOR OP/ED): Performed by: INTERNAL MEDICINE

## 2024-06-27 PROCEDURE — 97161 PT EVAL LOW COMPLEX 20 MIN: CPT | Mod: GP | Performed by: PHYSICAL THERAPIST

## 2024-06-27 PROCEDURE — 97165 OT EVAL LOW COMPLEX 30 MIN: CPT | Mod: GO

## 2024-06-27 PROCEDURE — 85027 COMPLETE CBC AUTOMATED: CPT | Performed by: INTERNAL MEDICINE

## 2024-06-27 PROCEDURE — 93306 TTE W/DOPPLER COMPLETE: CPT | Performed by: INTERNAL MEDICINE

## 2024-06-27 PROCEDURE — 83735 ASSAY OF MAGNESIUM: CPT | Performed by: STUDENT IN AN ORGANIZED HEALTH CARE EDUCATION/TRAINING PROGRAM

## 2024-06-27 PROCEDURE — 2500000001 HC RX 250 WO HCPCS SELF ADMINISTERED DRUGS (ALT 637 FOR MEDICARE OP): Performed by: NURSE PRACTITIONER

## 2024-06-27 PROCEDURE — 93005 ELECTROCARDIOGRAM TRACING: CPT

## 2024-06-27 PROCEDURE — 1200000002 HC GENERAL ROOM WITH TELEMETRY DAILY

## 2024-06-27 PROCEDURE — 70551 MRI BRAIN STEM W/O DYE: CPT

## 2024-06-27 PROCEDURE — 2500000004 HC RX 250 GENERAL PHARMACY W/ HCPCS (ALT 636 FOR OP/ED): Performed by: NURSE PRACTITIONER

## 2024-06-27 PROCEDURE — 99223 1ST HOSP IP/OBS HIGH 75: CPT | Performed by: INTERNAL MEDICINE

## 2024-06-27 PROCEDURE — 99232 SBSQ HOSP IP/OBS MODERATE 35: CPT | Performed by: STUDENT IN AN ORGANIZED HEALTH CARE EDUCATION/TRAINING PROGRAM

## 2024-06-27 PROCEDURE — 80053 COMPREHEN METABOLIC PANEL: CPT | Performed by: INTERNAL MEDICINE

## 2024-06-27 PROCEDURE — 2500000004 HC RX 250 GENERAL PHARMACY W/ HCPCS (ALT 636 FOR OP/ED): Performed by: STUDENT IN AN ORGANIZED HEALTH CARE EDUCATION/TRAINING PROGRAM

## 2024-06-27 RX ORDER — METOPROLOL SUCCINATE 25 MG/1
25 TABLET, EXTENDED RELEASE ORAL DAILY
Status: DISCONTINUED | OUTPATIENT
Start: 2024-06-27 | End: 2024-06-28 | Stop reason: HOSPADM

## 2024-06-27 RX ORDER — DEXAMETHASONE 6 MG/1
6 TABLET ORAL DAILY
Status: DISCONTINUED | OUTPATIENT
Start: 2024-06-27 | End: 2024-06-28 | Stop reason: HOSPADM

## 2024-06-27 RX ORDER — MAGNESIUM SULFATE HEPTAHYDRATE 40 MG/ML
2 INJECTION, SOLUTION INTRAVENOUS ONCE
Status: COMPLETED | OUTPATIENT
Start: 2024-06-27 | End: 2024-06-27

## 2024-06-27 RX ORDER — ENOXAPARIN SODIUM 100 MG/ML
40 INJECTION SUBCUTANEOUS EVERY 24 HOURS
Status: DISCONTINUED | OUTPATIENT
Start: 2024-06-27 | End: 2024-06-28 | Stop reason: HOSPADM

## 2024-06-27 ASSESSMENT — COGNITIVE AND FUNCTIONAL STATUS - GENERAL
TURNING FROM BACK TO SIDE WHILE IN FLAT BAD: A LITTLE
DRESSING REGULAR UPPER BODY CLOTHING: A LITTLE
MOVING TO AND FROM BED TO CHAIR: A LITTLE
DRESSING REGULAR LOWER BODY CLOTHING: A LOT
CLIMB 3 TO 5 STEPS WITH RAILING: TOTAL
MOBILITY SCORE: 16
PERSONAL GROOMING: A LITTLE
STANDING UP FROM CHAIR USING ARMS: A LITTLE
HELP NEEDED FOR BATHING: A LOT
TOILETING: A LOT
EATING MEALS: A LITTLE
WALKING IN HOSPITAL ROOM: A LITTLE
DAILY ACTIVITIY SCORE: 15
MOVING FROM LYING ON BACK TO SITTING ON SIDE OF FLAT BED WITH BEDRAILS: A LITTLE

## 2024-06-27 ASSESSMENT — PAIN SCALES - GENERAL
PAINLEVEL_OUTOF10: 0 - NO PAIN
PAINLEVEL_OUTOF10: 0 - NO PAIN

## 2024-06-27 ASSESSMENT — ACTIVITIES OF DAILY LIVING (ADL): BATHING_ASSISTANCE: MODERATE

## 2024-06-27 ASSESSMENT — PAIN - FUNCTIONAL ASSESSMENT
PAIN_FUNCTIONAL_ASSESSMENT: 0-10
PAIN_FUNCTIONAL_ASSESSMENT: 0-10

## 2024-06-27 NOTE — H&P
History Of Present Illness  Lukas Shen is a 74 y.o. male presenting with syncopal episode at home.  His wife called EMS for suspected stroke since he was unresponsive for about 3 minutes.  His wife described unresponsiveness without jerking movement or seizure.  Prior to that he was complaining of abdominal pain and was coughing.  Possible aspiration.  Patient himself reported cough and for a few days.  He believes he probably was choking but also was concerned about pneumonia since he had prior pneumonia presented similarly.  There was no fall, and was no other focal neurological deficit except from prior and no trauma.  Patient has history of stroke with left-sided residual weakness.    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    Surgical History  He has a past surgical history that includes Other surgical history (02/02/2021); MR angio head wo IV contrast (12/20/2020); MR angio neck wo IV contrast (12/20/2020); and CT angio neck (8/4/2023).     Social History  He reports that he quit smoking about 6 years ago. His smoking use included cigarettes. He has never used smokeless tobacco. He reports that he does not currently use alcohol. He reports that he does not use drugs.    Family History  No family history on file.     Allergies  Patient has no known allergies.    Review of Systems     Physical Exam  Constitutional:       Appearance: He is ill-appearing. He is not toxic-appearing or diaphoretic.   HENT:      Head: Normocephalic and atraumatic.      Nose: Nose normal.      Mouth/Throat:      Mouth: Mucous membranes are moist.   Eyes:      General: No scleral icterus.     Extraocular Movements: Extraocular movements intact.      Conjunctiva/sclera: Conjunctivae normal.      Pupils: Pupils are equal, round, and reactive to light.   Neck:      Vascular: No carotid bruit.   Cardiovascular:      Rate and Rhythm: Normal rate and  "regular rhythm.      Pulses: Normal pulses.      Heart sounds: No murmur heard.  Pulmonary:      Breath sounds: Rhonchi and rales present. No wheezing.   Chest:      Chest wall: No tenderness.   Abdominal:      General: There is no distension.      Palpations: There is no mass.      Tenderness: There is no abdominal tenderness. There is no right CVA tenderness, left CVA tenderness, guarding or rebound.      Hernia: No hernia is present.   Musculoskeletal:         General: No tenderness.      Cervical back: Normal range of motion and neck supple. No rigidity or tenderness.      Right lower leg: No edema.      Left lower leg: No edema.   Lymphadenopathy:      Cervical: No cervical adenopathy.   Skin:     General: Skin is warm and dry.      Findings: No lesion or rash.   Neurological:      Mental Status: He is alert and oriented to person, place, and time. Mental status is at baseline.   Psychiatric:         Mood and Affect: Mood normal.         Behavior: Behavior normal.          Last Recorded Vitals  Blood pressure 152/69, pulse 87, temperature (!) 38.2 °C (100.8 °F), temperature source Temporal, resp. rate 20, height 1.803 m (5' 11\"), weight 78.9 kg (174 lb), SpO2 95%.    Relevant Results  Scheduled medications  [START ON 6/27/2024] aspirin, 81 mg, oral, Daily  atorvastatin, 80 mg, oral, Nightly  azithromycin, 500 mg, intravenous, q24h  [START ON 6/27/2024] clopidogrel, 75 mg, oral, Daily  [START ON 6/27/2024] escitalopram, 10 mg, oral, Daily  [START ON 6/27/2024] pantoprazole, 40 mg, oral, Daily   Or  [START ON 6/27/2024] pantoprazole, 40 mg, intravenous, Daily  piperacillin-tazobactam, 3.375 g, intravenous, Once  [START ON 6/27/2024] piperacillin-tazobactam, 3.375 g, intravenous, q8h   And  [START ON 6/27/2024] sodium chloride, 10 mL, intravenous, q8h      Continuous medications     PRN medications  PRN medications: acetaminophen **OR** acetaminophen **OR** acetaminophen, melatonin, ondansetron **OR** ondansetron, " polyethylene glycol     Results for orders placed or performed during the hospital encounter of 06/26/24 (from the past 24 hour(s))   CBC and Auto Differential   Result Value Ref Range    WBC 12.8 (H) 4.4 - 11.3 x10*3/uL    nRBC 0.0 0.0 - 0.0 /100 WBCs    RBC 4.64 4.50 - 5.90 x10*6/uL    Hemoglobin 14.5 13.5 - 17.5 g/dL    Hematocrit 42.4 41.0 - 52.0 %    MCV 91 80 - 100 fL    MCH 31.3 26.0 - 34.0 pg    MCHC 34.2 32.0 - 36.0 g/dL    RDW 12.7 11.5 - 14.5 %    Platelets 230 150 - 450 x10*3/uL    Neutrophils % 91.2 40.0 - 80.0 %    Immature Granulocytes %, Automated 0.3 0.0 - 0.9 %    Lymphocytes % 3.5 13.0 - 44.0 %    Monocytes % 4.5 2.0 - 10.0 %    Eosinophils % 0.2 0.0 - 6.0 %    Basophils % 0.3 0.0 - 2.0 %    Neutrophils Absolute 11.66 (H) 1.60 - 5.50 x10*3/uL    Immature Granulocytes Absolute, Automated 0.04 0.00 - 0.50 x10*3/uL    Lymphocytes Absolute 0.45 (L) 0.80 - 3.00 x10*3/uL    Monocytes Absolute 0.58 0.05 - 0.80 x10*3/uL    Eosinophils Absolute 0.02 0.00 - 0.40 x10*3/uL    Basophils Absolute 0.04 0.00 - 0.10 x10*3/uL   Magnesium   Result Value Ref Range    Magnesium 1.82 1.60 - 2.40 mg/dL   Comprehensive metabolic panel   Result Value Ref Range    Glucose 109 (H) 74 - 99 mg/dL    Sodium 136 136 - 145 mmol/L    Potassium 4.1 3.5 - 5.3 mmol/L    Chloride 103 98 - 107 mmol/L    Bicarbonate 26 21 - 32 mmol/L    Anion Gap 11 10 - 20 mmol/L    Urea Nitrogen 18 6 - 23 mg/dL    Creatinine 1.35 (H) 0.50 - 1.30 mg/dL    eGFR 55 (L) >60 mL/min/1.73m*2    Calcium 9.1 8.6 - 10.3 mg/dL    Albumin 4.0 3.4 - 5.0 g/dL    Alkaline Phosphatase 113 33 - 136 U/L    Total Protein 7.1 6.4 - 8.2 g/dL    AST 13 9 - 39 U/L    Bilirubin, Total 0.6 0.0 - 1.2 mg/dL    ALT 11 10 - 52 U/L   Lipase   Result Value Ref Range    Lipase 21 9 - 82 U/L   Lactate   Result Value Ref Range    Lactate 1.5 0.4 - 2.0 mmol/L   aPTT   Result Value Ref Range    aPTT 29 27 - 38 seconds   Protime-INR   Result Value Ref Range    Protime 12.5 9.8 - 12.8  seconds    INR 1.1 0.9 - 1.1   B-Type Natriuretic Peptide   Result Value Ref Range    BNP 26 0 - 99 pg/mL   Troponin I, High Sensitivity, Initial   Result Value Ref Range    Troponin I, High Sensitivity 7 0 - 20 ng/L   Influenza A, and B PCR   Result Value Ref Range    Flu A Result Not Detected Not Detected    Flu B Result Not Detected Not Detected   Sars-CoV-2 PCR   Result Value Ref Range    Coronavirus 2019, PCR Detected (A) Not Detected   Blood Gas Venous   Result Value Ref Range    POCT pH, Venous 7.42 7.33 - 7.43 pH    POCT pCO2, Venous 47 41 - 51 mm Hg    POCT pO2, Venous 37 35 - 45 mm Hg    POCT SO2, Venous 59 45 - 75 %    POCT Oxy Hemoglobin, Venous 57.7 45.0 - 75.0 %    POCT Base Excess, Venous 5.1 (H) -2.0 - 3.0 mmol/L    POCT HCO3 Calculated, Venous 30.5 (H) 22.0 - 26.0 mmol/L    Patient Temperature      FiO2 24 %   Troponin, High Sensitivity, 1 Hour   Result Value Ref Range    Troponin I, High Sensitivity 5 0 - 20 ng/L       CT abdomen pelvis w IV contrast    Result Date: 6/26/2024  Interpreted By:  Hesham Davis, STUDY: CT ANGIO CHEST FOR PULMONARY EMBOLISM; CT ABDOMEN PELVIS W IV CONTRAST;  6/26/2024 5:34 pm   INDICATION: Signs/Symptoms:cough, abdominal pain; Signs/Symptoms:abdominal pain.   COMPARISON: Selected examinations as far back as July 15, 2021 CT chest abdomen and pelvis including August 29, 2022 chest CT and August 3, 2023 CT pulmonary angiogram   ACCESSION NUMBER(S): [id number]; [id number]   ORDERING CLINICIAN: LIZ NICHOLE   TECHNIQUE: Helical data acquisition of the chest was obtained after intravenous administration of 75 ML Omnipaque 350, as per PE protocol for the chest portion. Routine acquisitions of the abdomen and pelvis were obtained. Images were reformatted in coronal and sagittal planes. Axial and coronal maximum intensity projection (MIP) images of the chest were created and reviewed.   FINDINGS: POTENTIAL LIMITATIONS OF THE STUDY: Image degradation related to motion and  beam hardening artifact from radiopaque structures.   HEART AND VESSELS: No discrete filling defects within the main pulmonary artery or its branches to  proximal segmental level. Please note that, assessment of distal segmental and subsegmental branches is limited and small peripheral emboli are not entirely excluded. Main pulmonary artery and its branches are normal in caliber.   The thoracic aorta normal in course and caliber.Scattered atherosclerosis involving the thoracic aorta and branch vessels. 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 Moderate coronary artery calcifications are seen. Please note,the study is not optimized for evaluation of coronary arteries.   The cardiac chambers are not enlarged.   There is no pericardial effusion seen.   MEDIASTINUM AND SOCORRO, LOWER NECK AND AXILLA: The visualized thyroid gland is within normal limits. Similar pre-existing mediastinal and hilar lymph nodes with coarse calcifications. Esophagus appears within normal limits as seen.   LUNGS AND AIRWAYS: There is probable newly seen peripheral mucous plugging right lower lobe with adjacent newly seen subsegmental atelectasis.There is similar lung heterogeneity/mosaic attenuation. No evident consolidative pneumonia, edema or pleural effusion. There are scattered postinflammatory calcifications.   CHEST WALL AND LOWER NECK: Unremarkable.   ABDOMEN:   LIVER: Normal morphology. Scattered sharply marginated homogeneous hypodensities most compatible with cysts again seen.   BILE DUCTS: Normal caliber.   GALLBLADDER: Nondistended containing a radiopaque gallstone.   PANCREAS: The pancreas appears unremarkable without evidence of ductal dilatation or masses.   SPLEEN: Similar size with similar multifocal benign postinflammatory calcifications.   ADRENAL GLANDS: Within normal limits.   KIDNEYS AND URETERS: Normal morphology without suspicious mass or hydronephrosis. Similar probably benign bilateral  renal cysts.   PELVIS:   BLADDER: The urinary bladder is decompressed, limited for evaluation. No evident mass or calculus.   REPRODUCTIVE ORGANS: Similar appearance mildly indenting the base urinary bladder.   BOWEL: Nondistended. No wall thickening or surrounding inflammatory changes or pneumatosis.   VESSELS: Scattered atherosclerosis. Fusiform dilation infrarenal aorta measures 2.9 cm AP diameter compared with 2.8 cm.   PERITONEUM/RETROPERITONEUM/LYMPH NODES: No ascites or free air, no fluid collection. No abdominopelvic lymphadenopathy is present.   BONE AND SOFT TISSUE: No acute osseous abnormalities. There are scattered degenerative changes. No acute abdominal wall abnormalities.       1. No evidence of acute pulmonary embolism to  proximal segmental level. Please note that, assessment of distal segmental and subsegmental branches is limited and small peripheral emboli are not entirely excluded. 2. Probable new hazy mucous plugging/impaction peripheral bronchi right lower lobe with adjacent subsegmental atelectasis potentially related to aspiration. 3. Fusiform infrarenal abdominal aortic bulge measures 2.9 cm AP diameter compared with 2.8 cm. 4. No separate acute detected abnormalities.   MACRO: None   Signed by: Hesham Davis 6/26/2024 6:37 PM Dictation workstation:   [id number]    CT angio chest for pulmonary embolism    Result Date: 6/26/2024  Interpreted By:  Hesham Davis, STUDY: CT ANGIO CHEST FOR PULMONARY EMBOLISM; CT ABDOMEN PELVIS W IV CONTRAST;  6/26/2024 5:34 pm   INDICATION: Signs/Symptoms:cough, abdominal pain; Signs/Symptoms:abdominal pain.   COMPARISON: Selected examinations as far back as July 15, 2021 CT chest abdomen and pelvis including August 29, 2022 chest CT and August 3, 2023 CT pulmonary angiogram   ACCESSION NUMBER(S): [id number]; [id number]   ORDERING CLINICIAN: LIZ NICHOLE   TECHNIQUE: Helical data acquisition of the chest was obtained after intravenous administration  of 75 ML Omnipaque 350, as per PE protocol for the chest portion. Routine acquisitions of the abdomen and pelvis were obtained. Images were reformatted in coronal and sagittal planes. Axial and coronal maximum intensity projection (MIP) images of the chest were created and reviewed.   FINDINGS: POTENTIAL LIMITATIONS OF THE STUDY: Image degradation related to motion and beam hardening artifact from radiopaque structures.   HEART AND VESSELS: No discrete filling defects within the main pulmonary artery or its branches to  proximal segmental level. Please note that, assessment of distal segmental and subsegmental branches is limited and small peripheral emboli are not entirely excluded. Main pulmonary artery and its branches are normal in caliber.   The thoracic aorta normal in course and caliber.Scattered atherosclerosis involving the thoracic aorta and branch vessels. 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 Moderate coronary artery calcifications are seen. Please note,the study is not optimized for evaluation of coronary arteries.   The cardiac chambers are not enlarged.   There is no pericardial effusion seen.   MEDIASTINUM AND SOCORRO, LOWER NECK AND AXILLA: The visualized thyroid gland is within normal limits. Similar pre-existing mediastinal and hilar lymph nodes with coarse calcifications. Esophagus appears within normal limits as seen.   LUNGS AND AIRWAYS: There is probable newly seen peripheral mucous plugging right lower lobe with adjacent newly seen subsegmental atelectasis.There is similar lung heterogeneity/mosaic attenuation. No evident consolidative pneumonia, edema or pleural effusion. There are scattered postinflammatory calcifications.   CHEST WALL AND LOWER NECK: Unremarkable.   ABDOMEN:   LIVER: Normal morphology. Scattered sharply marginated homogeneous hypodensities most compatible with cysts again seen.   BILE DUCTS: Normal caliber.   GALLBLADDER: Nondistended  containing a radiopaque gallstone.   PANCREAS: The pancreas appears unremarkable without evidence of ductal dilatation or masses.   SPLEEN: Similar size with similar multifocal benign postinflammatory calcifications.   ADRENAL GLANDS: Within normal limits.   KIDNEYS AND URETERS: Normal morphology without suspicious mass or hydronephrosis. Similar probably benign bilateral renal cysts.   PELVIS:   BLADDER: The urinary bladder is decompressed, limited for evaluation. No evident mass or calculus.   REPRODUCTIVE ORGANS: Similar appearance mildly indenting the base urinary bladder.   BOWEL: Nondistended. No wall thickening or surrounding inflammatory changes or pneumatosis.   VESSELS: Scattered atherosclerosis. Fusiform dilation infrarenal aorta measures 2.9 cm AP diameter compared with 2.8 cm.   PERITONEUM/RETROPERITONEUM/LYMPH NODES: No ascites or free air, no fluid collection. No abdominopelvic lymphadenopathy is present.   BONE AND SOFT TISSUE: No acute osseous abnormalities. There are scattered degenerative changes. No acute abdominal wall abnormalities.       1. No evidence of acute pulmonary embolism to  proximal segmental level. Please note that, assessment of distal segmental and subsegmental branches is limited and small peripheral emboli are not entirely excluded. 2. Probable new hazy mucous plugging/impaction peripheral bronchi right lower lobe with adjacent subsegmental atelectasis potentially related to aspiration. 3. Fusiform infrarenal abdominal aortic bulge measures 2.9 cm AP diameter compared with 2.8 cm. 4. No separate acute detected abnormalities.   MACRO: None   Signed by: Hesham Davis 6/26/2024 6:37 PM Dictation workstation:   [id number]    CT head wo IV contrast    Result Date: 6/26/2024  Interpreted By:  Hesham Davis, STUDY: CT HEAD WO IV CONTRAST;  6/26/2024 5:28 pm   INDICATION: Signs/Symptoms:loss of consciousness.   COMPARISON: None.   ACCESSION NUMBER(S): [id number]   ORDERING  CLINICIAN: LIZ NICHOLE   TECHNIQUE: Noncontrast axial CT scan of head was performed. Angled reformats in brain and bone windows were generated. The images were reviewed in bone, brain, blood and soft tissue windows.   FINDINGS: Similar prominence of the ventricles, sulci, and cisterns compatible with atrophy. Similar large right MCA distribution area of encephalomalacia with ex vacuo dilation of the right lateral ventricle. Similar smaller area of encephalomalacia left frontal region. There are nonspecific low-density white matter changes. No detected acute intra or extra-axial fluid collection, mass, or mass effect.   Paranasal sinuses: Similar polypoid mucosal thickening or mucous retention cyst right sphenoid sinus.   Calvarium: Unremarkable.       No evident acute intracranial hemorrhage, mass, or mass effect for large territorial infarct.  If there is persistent concern for acute cerebral insult, MRI is recommended.     Similar chronic large area of encephalomalacia compatible with remote right MCA distribution infarct and smaller left MCA distribution infarct.   Chronic atrophy and nonspecific low-density white matter changes.   MACRO: None   Signed by: Hesham Davis 6/26/2024 6:19 PM Dictation workstation:   [id number]        Assessment/Plan   Principal Problem:    COVID-19  Active Problems:    Left hemiparesis (Multi)    Essential hypertension    Syncope and collapse    Aspiration pneumonia of right lower lobe (Multi)    -Start patient on IV antibiotics Zosyn to cover for suspected aspiration pneumonia.  -Add azithromycin.  -Aspiration precaution.  -Speech and swallow evaluation.  -Neurology consult for suspected TIA.  -Obtain echocardiogram for syncope workup.  -Cardiology consult.  -Isolation precaution for COVID.  -Symptomatic treatment for COVID.    Cee Obrien MD

## 2024-06-27 NOTE — PROGRESS NOTES
Occupational Therapy    Evaluation    Patient Name: Lukas Shen  MRN: [id number]  Today's Date: 6/27/2024  Time Calculation  Start Time: 1411  Stop Time: 1428  Time Calculation (min): 17 min      Assessment:  OT Assessment: Limted by decreased strength/endurance and impaired balance.  End of Session Communication: Bedside nurse  End of Session Patient Position: Bed, 3 rail up, Alarm on  OT Assessment Results: Decreased ADL status, Decreased upper extremity range of motion, Decreased upper extremity strength, Decreased endurance, Decreased functional mobility  Plan:  Treatment Interventions: ADL retraining, Functional transfer training, UE strengthening/ROM, Endurance training, Neuromuscular reeducation  OT Frequency: 2 times per week  OT Discharge Recommendations: High intensity level of continued care, Low intensity level of continued care (24/7 assist.)  OT - OK to Discharge: Yes (Once medically appropriate.)  Treatment Interventions: ADL retraining, Functional transfer training, UE strengthening/ROM, Endurance training, Neuromuscular reeducation    Subjective   General:  General  Reason for Referral: ADLs  Referred By: Dr. Bliss  Past Medical History Relevant to Rehab: includes: R CVA with L sided weakness. HTN, HLD, depression, pneumonia, carotid stenosis, venous thrombectomy,  Prior to Session Communication: Bedside nurse (Cleared for therapy evaluation by RN.)  Patient Position Received: Bed, 2 rail up, Alarm off, not on at start of session  General Comment: Pt. is a 75yo who presented to [facility] ED on 6/26/2024 with c/o abdominal pain with nausea x 1 day, productive cough with sputim x 1 day, unresponsiveness x 3 minutes at home per spouse. EMS also reports (+) LOC. In ED, O2 91%, Pt tested (+) for Covid.  ABD/Pelvic CT/CTA (6/26) (-) PE, (+) probable new mucous plug/impaction RLL bronchi with atelectasis  Head CT (6/26) (-) acute findings (+) chronic large emcephalomalacia due to infarct R MCA and smaller  encephalomalacia on L  Brain MRI pending  Dx:: Covid, TIA, syncope, aspritation pneumonia  Precautions:  Medical Precautions: Fall precautions, Infection precautions  Precautions Comment: L sided hemiparesis (prior CVA)     Pain:  Pain Assessment  Pain Assessment: 0-10  0-10 (Numeric) Pain Score: 0 - No pain    Objective   Cognition:  Overall Cognitive Status: Within Functional Limits  Orientation Level: Oriented X4           Home Living:  Home Living Comments: Patient lives with wife who is available to assist in a 1 story house with 0 FILI, no basement. Tub shower with a tub transfer bench.  Prior Function:  Prior Function Comments: Patient ambulates at mod I level with a FWW in the house, owns a cane and uses a W/C in the community. Requires assistance with ADLs and IADLs. Denies falls in the past 3 months. Patient does not drive, family assists with transportation.     ADL:  Eating Assistance:  (Setup/S)  Grooming Assistance: Minimal  Bathing Assistance: Moderate  UE Dressing Assistance: Minimal  LE Dressing Assistance: Maximal  Toileting Assistance with Device: Maximal     Bed Mobility/Transfers: Bed Mobility  Bed Mobility: Yes  Bed Mobility 1  Bed Mobility 1: Supine to sitting, Sitting to supine  Bed Mobility Comments 1: HOB elevated. Min A level.     Functional Mobility:  Functional Mobility  Functional Mobility Performed: Yes  Functional Mobility 1  Comments 1: Patient completed functional mobility throughout the room with a FWW at min A level. Cues for walker management, assist to position the L hand on the walker.     Standing Balance:  Dynamic Standing Balance  Dynamic Standing-Comments: Fair/fair -   Sensation:  Sensation Comment: Denies paresthesia in UEs.  Strength:  Strength Comments: R UE MMT 5/5. L UE hemiparesis from prior CVA.     Extremities: RUE   RUE : Within Functional Limits and LUE   LUE:  (L UE shoulder abd ~70 degrees, elbow flex/ext grossly WFL. L hand AAROM flex/ext WFL, patient keeps L  digits flexed.)    Outcome Measures:[facility] Daily Activity  Putting on and taking off regular lower body clothing: A lot  Bathing (including washing, rinsing, drying): A lot  Putting on and taking off regular upper body clothing: A little  Toileting, which includes using toilet, bedpan or urinal: A lot  Taking care of personal grooming such as brushing teeth: A little  Eating Meals: A little  Daily Activity - Total Score: 15    Education Documentation  Body Mechanics, taught by Yvonne Brooks OT at 6/27/2024  3:00 PM.  Learner: Patient  Readiness: Acceptance  Method: Explanation  Response: Needs Reinforcement    EDUCATION:  Education  Individual(s) Educated: Patient  Education Provided: POC discussed and agreed upon, Risk and benefits of OT discussed with patient or other, Fall precautons  Patient Response to Education: Patient/Caregiver Verbalized Understanding of Information    Goals:  Encounter Problems       Encounter Problems (Active)       OT Goals       Patient will complete household distance functional mobility with a FWW at SBA level.  (Progressing)       Start:  06/27/24    Expected End:  07/11/24            Patient will complete functional transfers with a FWW at SBA level.  (Progressing)       Start:  06/27/24    Expected End:  07/11/24            Patient will demonstrate fair + standing balance during functional tasks.  (Progressing)       Start:  06/27/24    Expected End:  07/11/24            Patient will tolerate standing greater than 3 minutes during functional tasks.  (Progressing)       Start:  06/27/24    Expected End:  07/11/24            Patient will complete toileting at a SBA level.  (Progressing)       Start:  06/27/24    Expected End:  07/11/24

## 2024-06-27 NOTE — PROGRESS NOTES
ASSESSMENT & PLAN:     Principal Problem:    COVID-19  Active Problems:    Hx CVA with  Left hemiparesis (Multi)    Concern for recurrent CVA vs recrudesence of old stroke symptoms    Essential hypertension    Syncope and collapse    Aspiration pneumonia of right lower lobe (Multi)    -Continue on zosyn. Will send procal with morning labs  -Neuro following; recs appreciated. To obtain MRI brain to r/o CVA  -Cards folloiwng; recs appreicated. TTE advised, will follow once obtained.   -Supportive care  -Currently maintaining saturations on room air, will monitor  -Decadron 10 mg daily x 10 days while in house    VTE Prophylaxis: Lovenox subQ      Pasha Bliss MD    SUBJECTIVE     Patient had no acute events overnight.  Continues to endorse feelings of malaise and feeling unwell.  Endorsing left-sided weakness that is worse than baseline and has not yet improved.  Further ROS was unremarkable.    OBJECTIVE:       Last Recorded Vitals:  Vitals:    06/27/24 0827 06/27/24 1230 06/27/24 1232 06/27/24 1234   BP: 123/59 129/60 125/62 122/66   BP Location:  Right arm Right arm Right arm   Patient Position:  Lying Sitting Standing   Pulse: 83 72 86 100   Resp:       Temp: 37.6 °C (99.7 °F) (!) 38.1 °C (100.6 °F) (!) 38.1 °C (100.6 °F) (!) 38.1 °C (100.6 °F)   TempSrc:  Temporal Temporal Temporal   SpO2: 94% 92% 94% 92%   Weight:       Height:           Last I/O:  I/O last 3 completed shifts:  In: - (0 mL/kg)   Out: 650 (8.2 mL/kg) [Urine:650 (0.2 mL/kg/hr)]  Weight: 78.9 kg     Physical Exam:  General: Well-developed adult male in mild distress  HEENT: Clear sclera, EOMI, trachea midline, moist mucous membranes, rhinorrhea  Respiratory: Equal chest rise, no retractions  Abdomen: Soft, nontender, nondistended  Extremities: No cyanosis or clubbing appreciated  Psychiatric: Appropriate mood and affect  Skin: Warm, dry    Inpatient Medications:  aspirin, 81 mg, oral, Daily  atorvastatin, 80 mg, oral, Nightly  azithromycin, 500  mg, intravenous, q24h  clopidogrel, 75 mg, oral, Daily  escitalopram, 10 mg, oral, Daily  metoprolol succinate XL, 25 mg, oral, Daily  pantoprazole, 40 mg, oral, Daily   Or  pantoprazole, 40 mg, intravenous, Daily  piperacillin-tazobactam, 3.375 g, intravenous, q8h   And  sodium chloride, 10 mL, intravenous, q8h        PRN Medications  PRN medications: acetaminophen **OR** acetaminophen **OR** acetaminophen, benzocaine-menthol, melatonin, ondansetron **OR** ondansetron, polyethylene glycol    Continuous Medications:         LABS AND IMAGING:     Labs:  Results for orders placed or performed during the hospital encounter of 06/26/24 (from the past 24 hour(s))   CBC and Auto Differential   Result Value Ref Range    WBC 12.8 (H) 4.4 - 11.3 x10*3/uL    nRBC 0.0 0.0 - 0.0 /100 WBCs    RBC 4.64 4.50 - 5.90 x10*6/uL    Hemoglobin 14.5 13.5 - 17.5 g/dL    Hematocrit 42.4 41.0 - 52.0 %    MCV 91 80 - 100 fL    MCH 31.3 26.0 - 34.0 pg    MCHC 34.2 32.0 - 36.0 g/dL    RDW 12.7 11.5 - 14.5 %    Platelets 230 150 - 450 x10*3/uL    Neutrophils % 91.2 40.0 - 80.0 %    Immature Granulocytes %, Automated 0.3 0.0 - 0.9 %    Lymphocytes % 3.5 13.0 - 44.0 %    Monocytes % 4.5 2.0 - 10.0 %    Eosinophils % 0.2 0.0 - 6.0 %    Basophils % 0.3 0.0 - 2.0 %    Neutrophils Absolute 11.66 (H) 1.60 - 5.50 x10*3/uL    Immature Granulocytes Absolute, Automated 0.04 0.00 - 0.50 x10*3/uL    Lymphocytes Absolute 0.45 (L) 0.80 - 3.00 x10*3/uL    Monocytes Absolute 0.58 0.05 - 0.80 x10*3/uL    Eosinophils Absolute 0.02 0.00 - 0.40 x10*3/uL    Basophils Absolute 0.04 0.00 - 0.10 x10*3/uL   Magnesium   Result Value Ref Range    Magnesium 1.82 1.60 - 2.40 mg/dL   Comprehensive metabolic panel   Result Value Ref Range    Glucose 109 (H) 74 - 99 mg/dL    Sodium 136 136 - 145 mmol/L    Potassium 4.1 3.5 - 5.3 mmol/L    Chloride 103 98 - 107 mmol/L    Bicarbonate 26 21 - 32 mmol/L    Anion Gap 11 10 - 20 mmol/L    Urea Nitrogen 18 6 - 23 mg/dL    Creatinine  1.35 (H) 0.50 - 1.30 mg/dL    eGFR 55 (L) >60 mL/min/1.73m*2    Calcium 9.1 8.6 - 10.3 mg/dL    Albumin 4.0 3.4 - 5.0 g/dL    Alkaline Phosphatase 113 33 - 136 U/L    Total Protein 7.1 6.4 - 8.2 g/dL    AST 13 9 - 39 U/L    Bilirubin, Total 0.6 0.0 - 1.2 mg/dL    ALT 11 10 - 52 U/L   Lipase   Result Value Ref Range    Lipase 21 9 - 82 U/L   Lactate   Result Value Ref Range    Lactate 1.5 0.4 - 2.0 mmol/L   aPTT   Result Value Ref Range    aPTT 29 27 - 38 seconds   Protime-INR   Result Value Ref Range    Protime 12.5 9.8 - 12.8 seconds    INR 1.1 0.9 - 1.1   B-Type Natriuretic Peptide   Result Value Ref Range    BNP 26 0 - 99 pg/mL   Troponin I, High Sensitivity, Initial   Result Value Ref Range    Troponin I, High Sensitivity 7 0 - 20 ng/L   Influenza A, and B PCR   Result Value Ref Range    Flu A Result Not Detected Not Detected    Flu B Result Not Detected Not Detected   Sars-CoV-2 PCR   Result Value Ref Range    Coronavirus 2019, PCR Detected (A) Not Detected   Blood Gas Venous   Result Value Ref Range    POCT pH, Venous 7.42 7.33 - 7.43 pH    POCT pCO2, Venous 47 41 - 51 mm Hg    POCT pO2, Venous 37 35 - 45 mm Hg    POCT SO2, Venous 59 45 - 75 %    POCT Oxy Hemoglobin, Venous 57.7 45.0 - 75.0 %    POCT Base Excess, Venous 5.1 (H) -2.0 - 3.0 mmol/L    POCT HCO3 Calculated, Venous 30.5 (H) 22.0 - 26.0 mmol/L    Patient Temperature      FiO2 24 %   Troponin, High Sensitivity, 1 Hour   Result Value Ref Range    Troponin I, High Sensitivity 5 0 - 20 ng/L   SST TOP   Result Value Ref Range    Extra Tube Hold for add-ons.    CBC   Result Value Ref Range    WBC 10.2 4.4 - 11.3 x10*3/uL    nRBC 0.0 0.0 - 0.0 /100 WBCs    RBC 4.45 (L) 4.50 - 5.90 x10*6/uL    Hemoglobin 14.0 13.5 - 17.5 g/dL    Hematocrit 42.0 41.0 - 52.0 %    MCV 94 80 - 100 fL    MCH 31.5 26.0 - 34.0 pg    MCHC 33.3 32.0 - 36.0 g/dL    RDW 12.5 11.5 - 14.5 %    Platelets 206 150 - 450 x10*3/uL   Comprehensive metabolic panel   Result Value Ref Range     Glucose 101 (H) 74 - 99 mg/dL    Sodium 137 136 - 145 mmol/L    Potassium 4.4 3.5 - 5.3 mmol/L    Chloride 104 98 - 107 mmol/L    Bicarbonate 24 21 - 32 mmol/L    Anion Gap 13 10 - 20 mmol/L    Urea Nitrogen 16 6 - 23 mg/dL    Creatinine 1.36 (H) 0.50 - 1.30 mg/dL    eGFR 55 (L) >60 mL/min/1.73m*2    Calcium 8.9 8.6 - 10.3 mg/dL    Albumin 3.7 3.4 - 5.0 g/dL    Alkaline Phosphatase 95 33 - 136 U/L    Total Protein 6.6 6.4 - 8.2 g/dL    AST 15 9 - 39 U/L    Bilirubin, Total 0.7 0.0 - 1.2 mg/dL    ALT 8 (L) 10 - 52 U/L   Magnesium   Result Value Ref Range    Magnesium 1.93 1.60 - 2.40 mg/dL   ECG 12 Lead   Result Value Ref Range    Ventricular Rate 81 BPM    Atrial Rate 81 BPM    OR Interval 178 ms    QRS Duration 92 ms    QT Interval 354 ms    QTC Calculation(Bazett) 411 ms    P Axis 56 degrees    R Axis -3 degrees    T Axis 63 degrees    QRS Count 13 beats    Q Onset 225 ms    P Onset 136 ms    P Offset 185 ms    T Offset 402 ms    QTC Fredericia 391 ms   Transthoracic Echo (TTE) Complete   Result Value Ref Range    BSA 1.99 m2        Imaging:  ECG 12 Lead  Normal sinus rhythm  Normal ECG  When compared with ECG of 03-AUG-2023 18:48,  Questionable change in QRS axis

## 2024-06-27 NOTE — CONSULTS
"Inpatient consult to Neurology  Consult performed by: DAVID Martinez-CNP  Consult ordered by: Cee Obrien MD          History Of Present Illness  Lukas Shen is a 74 y.o. male presenting with syncope and worsening left-sided weakness from baseline. Some history obtained from the patient. He states he was getting up from using the bathroom and he passed out. He felt dizzy beforehand, per wife, he was unresponsive x 3 minutes, he remembers coming to and seeing his wife and EMS in his home. He denies being confused. He states he has had these episodes in the past, usually when he is getting up from using the bathroom. 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 Per wife, he has been dragging his left leg at home, which he was not doing before and she is concerned about new stroke. He has been compliant with his Plavix, aspirin and statin at home.  He is using a motorized wheelchair at home and BSC when needed due to his left-sided weakness. He has a history of closed head injury in 1990's from an accident at work. No history of seizures.    ED Course: EKG NSR /65 92 16 91% 38.2*C  CTH no acute infarct or bleed, CT chest -PE Covid + with right lower lobe PNA.  On exam, he continues with left sided weakness, patient states is not worsened from his baseline. He is alert and oriented to self, place and exact date, \"June 27th, 2024\".   Review of systems are negative unless otherwise specified in HPI.   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     Surgical History  Past Surgical History:   Procedure Laterality Date    CT ANGIO NECK  8/4/2023    CT NECK ANGIO W AND WO IV CONTRAST 8/4/2023 [facility]    MR HEAD ANGIO WO IV CONTRAST  12/20/2020    MR HEAD ANGIO WO IV CONTRAST 12/20/2020 [facility]    MR NECK ANGIO WO IV CONTRAST  12/20/2020    MR NECK ANGIO WO IV CONTRAST 12/20/2020 [facility] CLINICAL LEGAC[facility]    OTHER " SURGICAL HISTORY  2021    Venous thrombectomy     Social History  Social History     Tobacco Use    Smoking status: Former     Current packs/day: 0.00     Types: Cigarettes     Quit date: 2018     Years since quittin.4    Smokeless tobacco: Never   Vaping Use    Vaping status: Never Used   Substance Use Topics    Alcohol use: Not Currently    Drug use: Never     Allergies  Patient has no known allergies.  Medications Prior to Admission   Medication Sig Dispense Refill Last Dose    albuterol 90 mcg/actuation inhaler Inhale.       aspirin 81 mg chewable tablet Chew.       atorvastatin (Lipitor) 80 mg tablet Take 1 tablet (80 mg) by mouth once daily. 90 tablet 3     clopidogrel (Plavix) 75 mg tablet Take 1 tablet (75 mg) by mouth once daily. 90 tablet 3     escitalopram (Lexapro) 10 mg tablet Take 1 tablet (10 mg) by mouth once daily. 90 tablet 3     esomeprazole (NexIUM) 40 mg DR capsule Take 1 capsule (40 mg) by mouth once daily. 90 capsule 3        Review of Systems  Neurological Exam  Mental Status  Awake, alert and oriented to person, place and time. Speech is normal. Language is fluent with no aphasia.    Cranial Nerves  CN III, IV, VI: Extraocular movements intact bilaterally. No nystagmus.   Right pupil: 3 mm. Round. Reactive to light. Reactive to accommodation.   Left pupil: 3 mm. Round. Reactive to light. Reactive to accommodation.  CN V:  Right: Facial sensation is normal.  Left: Facial sensation is normal on the left.  CN VII:  Right: There is no facial weakness.  Left: There is no facial weakness.  CN VIII:  Right: Hearing is normal.  Left: Hearing is normal.  CN IX, X:  Right: Palate is normal.  Left: Palate is normal.  CN XI:  Right: Trapezius strength is normal.  Left: Trapezius strength is weak.  CN XII: Tongue midline without atrophy or fasciculations.    Motor  Normal muscle bulk throughout. Normal muscle tone. Strength is 5/5 in all four extremities except as noted.                      "                        Right                     Left  Hip flexion                                                       4  LLE drift, with weakened hip flexion.    Sensory  Light touch is normal in upper and lower extremities.     Coordination  Right: Finger-to-nose normal.Left: Finger-to-nose abnormality:  Overshoot.    Gait  Casual gait:    Physical Exam  HENT:      Right Ear: Hearing normal.      Left Ear: Hearing normal.   Eyes:      Extraocular Movements: EOM normal. No nystagmus.   Psychiatric:         Speech: Speech normal.       Last Recorded Vitals  Blood pressure 123/59, pulse 83, temperature 37.6 °C (99.7 °F), resp. rate 18, height 1.803 m (5' 11\"), weight 78.9 kg (174 lb), SpO2 94%.    Relevant Results  Results for orders placed or performed during the hospital encounter of 06/26/24 (from the past 24 hour(s))   CBC and Auto Differential   Result Value Ref Range    WBC 12.8 (H) 4.4 - 11.3 x10*3/uL    nRBC 0.0 0.0 - 0.0 /100 WBCs    RBC 4.64 4.50 - 5.90 x10*6/uL    Hemoglobin 14.5 13.5 - 17.5 g/dL    Hematocrit 42.4 41.0 - 52.0 %    MCV 91 80 - 100 fL    MCH 31.3 26.0 - 34.0 pg    MCHC 34.2 32.0 - 36.0 g/dL    RDW 12.7 11.5 - 14.5 %    Platelets 230 150 - 450 x10*3/uL    Neutrophils % 91.2 40.0 - 80.0 %    Immature Granulocytes %, Automated 0.3 0.0 - 0.9 %    Lymphocytes % 3.5 13.0 - 44.0 %    Monocytes % 4.5 2.0 - 10.0 %    Eosinophils % 0.2 0.0 - 6.0 %    Basophils % 0.3 0.0 - 2.0 %    Neutrophils Absolute 11.66 (H) 1.60 - 5.50 x10*3/uL    Immature Granulocytes Absolute, Automated 0.04 0.00 - 0.50 x10*3/uL    Lymphocytes Absolute 0.45 (L) 0.80 - 3.00 x10*3/uL    Monocytes Absolute 0.58 0.05 - 0.80 x10*3/uL    Eosinophils Absolute 0.02 0.00 - 0.40 x10*3/uL    Basophils Absolute 0.04 0.00 - 0.10 x10*3/uL   Magnesium   Result Value Ref Range    Magnesium 1.82 1.60 - 2.40 mg/dL   Comprehensive metabolic panel   Result Value Ref Range    Glucose 109 (H) 74 - 99 mg/dL    Sodium 136 136 - 145 mmol/L    " Potassium 4.1 3.5 - 5.3 mmol/L    Chloride 103 98 - 107 mmol/L    Bicarbonate 26 21 - 32 mmol/L    Anion Gap 11 10 - 20 mmol/L    Urea Nitrogen 18 6 - 23 mg/dL    Creatinine 1.35 (H) 0.50 - 1.30 mg/dL    eGFR 55 (L) >60 mL/min/1.73m*2    Calcium 9.1 8.6 - 10.3 mg/dL    Albumin 4.0 3.4 - 5.0 g/dL    Alkaline Phosphatase 113 33 - 136 U/L    Total Protein 7.1 6.4 - 8.2 g/dL    AST 13 9 - 39 U/L    Bilirubin, Total 0.6 0.0 - 1.2 mg/dL    ALT 11 10 - 52 U/L   Lipase   Result Value Ref Range    Lipase 21 9 - 82 U/L   Lactate   Result Value Ref Range    Lactate 1.5 0.4 - 2.0 mmol/L   aPTT   Result Value Ref Range    aPTT 29 27 - 38 seconds   Protime-INR   Result Value Ref Range    Protime 12.5 9.8 - 12.8 seconds    INR 1.1 0.9 - 1.1   B-Type Natriuretic Peptide   Result Value Ref Range    BNP 26 0 - 99 pg/mL   Troponin I, High Sensitivity, Initial   Result Value Ref Range    Troponin I, High Sensitivity 7 0 - 20 ng/L   Influenza A, and B PCR   Result Value Ref Range    Flu A Result Not Detected Not Detected    Flu B Result Not Detected Not Detected   Sars-CoV-2 PCR   Result Value Ref Range    Coronavirus 2019, PCR Detected (A) Not Detected   Blood Gas Venous   Result Value Ref Range    POCT pH, Venous 7.42 7.33 - 7.43 pH    POCT pCO2, Venous 47 41 - 51 mm Hg    POCT pO2, Venous 37 35 - 45 mm Hg    POCT SO2, Venous 59 45 - 75 %    POCT Oxy Hemoglobin, Venous 57.7 45.0 - 75.0 %    POCT Base Excess, Venous 5.1 (H) -2.0 - 3.0 mmol/L    POCT HCO3 Calculated, Venous 30.5 (H) 22.0 - 26.0 mmol/L    Patient Temperature      FiO2 24 %   Troponin, High Sensitivity, 1 Hour   Result Value Ref Range    Troponin I, High Sensitivity 5 0 - 20 ng/L   SST TOP   Result Value Ref Range    Extra Tube Hold for add-ons.    CBC   Result Value Ref Range    WBC 10.2 4.4 - 11.3 x10*3/uL    nRBC 0.0 0.0 - 0.0 /100 WBCs    RBC 4.45 (L) 4.50 - 5.90 x10*6/uL    Hemoglobin 14.0 13.5 - 17.5 g/dL    Hematocrit 42.0 41.0 - 52.0 %    MCV 94 80 - 100 fL    MCH  31.5 26.0 - 34.0 pg    MCHC 33.3 32.0 - 36.0 g/dL    RDW 12.5 11.5 - 14.5 %    Platelets 206 150 - 450 x10*3/uL   Comprehensive metabolic panel   Result Value Ref Range    Glucose 101 (H) 74 - 99 mg/dL    Sodium 137 136 - 145 mmol/L    Potassium 4.4 3.5 - 5.3 mmol/L    Chloride 104 98 - 107 mmol/L    Bicarbonate 24 21 - 32 mmol/L    Anion Gap 13 10 - 20 mmol/L    Urea Nitrogen 16 6 - 23 mg/dL    Creatinine 1.36 (H) 0.50 - 1.30 mg/dL    eGFR 55 (L) >60 mL/min/1.73m*2    Calcium 8.9 8.6 - 10.3 mg/dL    Albumin 3.7 3.4 - 5.0 g/dL    Alkaline Phosphatase 95 33 - 136 U/L    Total Protein 6.6 6.4 - 8.2 g/dL    AST 15 9 - 39 U/L    Bilirubin, Total 0.7 0.0 - 1.2 mg/dL    ALT 8 (L) 10 - 52 U/L   ECG 12 Lead   Result Value Ref Range    Ventricular Rate 81 BPM    Atrial Rate 81 BPM    CT Interval 178 ms    QRS Duration 92 ms    QT Interval 354 ms    QTC Calculation(Bazett) 411 ms    P Axis 56 degrees    R Axis -3 degrees    T Axis 63 degrees    QRS Count 13 beats    Q Onset 225 ms    P Onset 136 ms    P Offset 185 ms    T Offset 402 ms    QTC Fredericia 391 ms   ECG 12 Lead    Result Date: 6/27/2024  Normal sinus rhythm Normal ECG When compared with ECG of 03-AUG-2023 18:48, Questionable change in QRS axis    CT abdomen pelvis w IV contrast    Result Date: 6/26/2024  Interpreted By:  Hesham Davis, STUDY: CT ANGIO CHEST FOR PULMONARY EMBOLISM; CT ABDOMEN PELVIS W IV CONTRAST;  6/26/2024 5:34 pm   INDICATION: Signs/Symptoms:cough, abdominal pain; Signs/Symptoms:abdominal pain.   COMPARISON: Selected examinations as far back as July 15, 2021 CT chest abdomen and pelvis including August 29, 2022 chest CT and August 3, 2023 CT pulmonary angiogram   ACCESSION NUMBER(S): [id number]; [id number]   ORDERING CLINICIAN: LIZ NICHOLE   TECHNIQUE: Helical data acquisition of the chest was obtained after intravenous administration of 75 ML Omnipaque 350, as per PE protocol for the chest portion. Routine acquisitions of the abdomen  and pelvis were obtained. Images were reformatted in coronal and sagittal planes. Axial and coronal maximum intensity projection (MIP) images of the chest were created and reviewed.   FINDINGS: POTENTIAL LIMITATIONS OF THE STUDY: Image degradation related to motion and beam hardening artifact from radiopaque structures.   HEART AND VESSELS: No discrete filling defects within the main pulmonary artery or its branches to  proximal segmental level. Please note that, assessment of distal segmental and subsegmental branches is limited and small peripheral emboli are not entirely excluded. Main pulmonary artery and its branches are normal in caliber.   The thoracic aorta normal in course and caliber.Scattered atherosclerosis involving the thoracic aorta and branch vessels. 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 Moderate coronary artery calcifications are seen. Please note,the study is not optimized for evaluation of coronary arteries.   The cardiac chambers are not enlarged.   There is no pericardial effusion seen.   MEDIASTINUM AND SOCORRO, LOWER NECK AND AXILLA: The visualized thyroid gland is within normal limits. Similar pre-existing mediastinal and hilar lymph nodes with coarse calcifications. Esophagus appears within normal limits as seen.   LUNGS AND AIRWAYS: There is probable newly seen peripheral mucous plugging right lower lobe with adjacent newly seen subsegmental atelectasis.There is similar lung heterogeneity/mosaic attenuation. No evident consolidative pneumonia, edema or pleural effusion. There are scattered postinflammatory calcifications.   CHEST WALL AND LOWER NECK: Unremarkable.   ABDOMEN:   LIVER: Normal morphology. Scattered sharply marginated homogeneous hypodensities most compatible with cysts again seen.   BILE DUCTS: Normal caliber.   GALLBLADDER: Nondistended containing a radiopaque gallstone.   PANCREAS: The pancreas appears unremarkable without evidence of  ductal dilatation or masses.   SPLEEN: Similar size with similar multifocal benign postinflammatory calcifications.   ADRENAL GLANDS: Within normal limits.   KIDNEYS AND URETERS: Normal morphology without suspicious mass or hydronephrosis. Similar probably benign bilateral renal cysts.   PELVIS:   BLADDER: The urinary bladder is decompressed, limited for evaluation. No evident mass or calculus.   REPRODUCTIVE ORGANS: Similar appearance mildly indenting the base urinary bladder.   BOWEL: Nondistended. No wall thickening or surrounding inflammatory changes or pneumatosis.   VESSELS: Scattered atherosclerosis. Fusiform dilation infrarenal aorta measures 2.9 cm AP diameter compared with 2.8 cm.   PERITONEUM/RETROPERITONEUM/LYMPH NODES: No ascites or free air, no fluid collection. No abdominopelvic lymphadenopathy is present.   BONE AND SOFT TISSUE: No acute osseous abnormalities. There are scattered degenerative changes. No acute abdominal wall abnormalities.       1. No evidence of acute pulmonary embolism to  proximal segmental level. Please note that, assessment of distal segmental and subsegmental branches is limited and small peripheral emboli are not entirely excluded. 2. Probable new hazy mucous plugging/impaction peripheral bronchi right lower lobe with adjacent subsegmental atelectasis potentially related to aspiration. 3. Fusiform infrarenal abdominal aortic bulge measures 2.9 cm AP diameter compared with 2.8 cm. 4. No separate acute detected abnormalities.   MACRO: None   Signed by: Hesham Davis 6/26/2024 6:37 PM Dictation workstation:   [id number]    CT angio chest for pulmonary embolism    Result Date: 6/26/2024  Interpreted By:  Hesham Davis, STUDY: CT ANGIO CHEST FOR PULMONARY EMBOLISM; CT ABDOMEN PELVIS W IV CONTRAST;  6/26/2024 5:34 pm   INDICATION: Signs/Symptoms:cough, abdominal pain; Signs/Symptoms:abdominal pain.   COMPARISON: Selected examinations as far back as July 15, 2021 CT chest  abdomen and pelvis including August 29, 2022 chest CT and August 3, 2023 CT pulmonary angiogram   ACCESSION NUMBER(S): [id number]; [id number]   ORDERING CLINICIAN: LIZ NICHOLE   TECHNIQUE: Helical data acquisition of the chest was obtained after intravenous administration of 75 ML Omnipaque 350, as per PE protocol for the chest portion. Routine acquisitions of the abdomen and pelvis were obtained. Images were reformatted in coronal and sagittal planes. Axial and coronal maximum intensity projection (MIP) images of the chest were created and reviewed.   FINDINGS: POTENTIAL LIMITATIONS OF THE STUDY: Image degradation related to motion and beam hardening artifact from radiopaque structures.   HEART AND VESSELS: No discrete filling defects within the main pulmonary artery or its branches to  proximal segmental level. Please note that, assessment of distal segmental and subsegmental branches is limited and small peripheral emboli are not entirely excluded. Main pulmonary artery and its branches are normal in caliber.   The thoracic aorta normal in course and caliber.Scattered atherosclerosis involving the thoracic aorta and branch vessels. 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 Moderate coronary artery calcifications are seen. Please note,the study is not optimized for evaluation of coronary arteries.   The cardiac chambers are not enlarged.   There is no pericardial effusion seen.   MEDIASTINUM AND SOCORRO, LOWER NECK AND AXILLA: The visualized thyroid gland is within normal limits. Similar pre-existing mediastinal and hilar lymph nodes with coarse calcifications. Esophagus appears within normal limits as seen.   LUNGS AND AIRWAYS: There is probable newly seen peripheral mucous plugging right lower lobe with adjacent newly seen subsegmental atelectasis.There is similar lung heterogeneity/mosaic attenuation. No evident consolidative pneumonia, edema or pleural effusion. There are  scattered postinflammatory calcifications.   CHEST WALL AND LOWER NECK: Unremarkable.   ABDOMEN:   LIVER: Normal morphology. Scattered sharply marginated homogeneous hypodensities most compatible with cysts again seen.   BILE DUCTS: Normal caliber.   GALLBLADDER: Nondistended containing a radiopaque gallstone.   PANCREAS: The pancreas appears unremarkable without evidence of ductal dilatation or masses.   SPLEEN: Similar size with similar multifocal benign postinflammatory calcifications.   ADRENAL GLANDS: Within normal limits.   KIDNEYS AND URETERS: Normal morphology without suspicious mass or hydronephrosis. Similar probably benign bilateral renal cysts.   PELVIS:   BLADDER: The urinary bladder is decompressed, limited for evaluation. No evident mass or calculus.   REPRODUCTIVE ORGANS: Similar appearance mildly indenting the base urinary bladder.   BOWEL: Nondistended. No wall thickening or surrounding inflammatory changes or pneumatosis.   VESSELS: Scattered atherosclerosis. Fusiform dilation infrarenal aorta measures 2.9 cm AP diameter compared with 2.8 cm.   PERITONEUM/RETROPERITONEUM/LYMPH NODES: No ascites or free air, no fluid collection. No abdominopelvic lymphadenopathy is present.   BONE AND SOFT TISSUE: No acute osseous abnormalities. There are scattered degenerative changes. No acute abdominal wall abnormalities.       1. No evidence of acute pulmonary embolism to  proximal segmental level. Please note that, assessment of distal segmental and subsegmental branches is limited and small peripheral emboli are not entirely excluded. 2. Probable new hazy mucous plugging/impaction peripheral bronchi right lower lobe with adjacent subsegmental atelectasis potentially related to aspiration. 3. Fusiform infrarenal abdominal aortic bulge measures 2.9 cm AP diameter compared with 2.8 cm. 4. No separate acute detected abnormalities.   MACRO: None   Signed by: Hesham Davis 6/26/2024 6:37 PM Dictation workstation:    [id number]    CT head wo IV contrast    Result Date: 6/26/2024  Interpreted By:  Hesham Davis, STUDY: CT HEAD WO IV CONTRAST;  6/26/2024 5:28 pm   INDICATION: Signs/Symptoms:loss of consciousness.   COMPARISON: None.   ACCESSION NUMBER(S): [id number]   ORDERING CLINICIAN: LIZ NICHOLE   TECHNIQUE: Noncontrast axial CT scan of head was performed. Angled reformats in brain and bone windows were generated. The images were reviewed in bone, brain, blood and soft tissue windows.   FINDINGS: Similar prominence of the ventricles, sulci, and cisterns compatible with atrophy. Similar large right MCA distribution area of encephalomalacia with ex vacuo dilation of the right lateral ventricle. Similar smaller area of encephalomalacia left frontal region. There are nonspecific low-density white matter changes. No detected acute intra or extra-axial fluid collection, mass, or mass effect.   Paranasal sinuses: Similar polypoid mucosal thickening or mucous retention cyst right sphenoid sinus.   Calvarium: Unremarkable.       No evident acute intracranial hemorrhage, mass, or mass effect for large territorial infarct.  If there is persistent concern for acute cerebral insult, MRI is recommended.     Similar chronic large area of encephalomalacia compatible with remote right MCA distribution infarct and smaller left MCA distribution infarct.   Chronic atrophy and nonspecific low-density white matter changes.   MACRO: None   Signed by: Hesham Davis 6/26/2024 6:19 PM Dictation workstation:   [id number]   Scheduled medications   Medication Dose Route Frequency    aspirin  81 mg oral Daily    atorvastatin  80 mg oral Nightly    azithromycin  500 mg intravenous q24h    clopidogrel  75 mg oral Daily    escitalopram  10 mg oral Daily    magnesium sulfate  2 g intravenous Once    metoprolol succinate XL  25 mg oral Daily    pantoprazole  40 mg oral Daily    Or    pantoprazole  40 mg intravenous Daily    piperacillin-tazobactam   3.375 g intravenous q8h    And    sodium chloride  10 mL intravenous q8h     PRN medications   Medication    acetaminophen    Or    acetaminophen    Or    acetaminophen    benzocaine-menthol    melatonin    ondansetron    Or    ondansetron    polyethylene glycol                                         I have personally reviewed the following imaging results ECG 12 Lead    Result Date: 6/27/2024  Normal sinus rhythm Normal ECG When compared with ECG of 03-AUG-2023 18:48, Questionable change in QRS axis    CT abdomen pelvis w IV contrast    Result Date: 6/26/2024  Interpreted By:  Hesham Davis, STUDY: CT ANGIO CHEST FOR PULMONARY EMBOLISM; CT ABDOMEN PELVIS W IV CONTRAST;  6/26/2024 5:34 pm   INDICATION: Signs/Symptoms:cough, abdominal pain; Signs/Symptoms:abdominal pain.   COMPARISON: Selected examinations as far back as July 15, 2021 CT chest abdomen and pelvis including August 29, 2022 chest CT and August 3, 2023 CT pulmonary angiogram   ACCESSION NUMBER(S): [id number]; [id number]   ORDERING CLINICIAN: LIZ NICHOLE   TECHNIQUE: Helical data acquisition of the chest was obtained after intravenous administration of 75 ML Omnipaque 350, as per PE protocol for the chest portion. Routine acquisitions of the abdomen and pelvis were obtained. Images were reformatted in coronal and sagittal planes. Axial and coronal maximum intensity projection (MIP) images of the chest were created and reviewed.   FINDINGS: POTENTIAL LIMITATIONS OF THE STUDY: Image degradation related to motion and beam hardening artifact from radiopaque structures.   HEART AND VESSELS: No discrete filling defects within the main pulmonary artery or its branches to  proximal segmental level. Please note that, assessment of distal segmental and subsegmental branches is limited and small peripheral emboli are not entirely excluded. Main pulmonary artery and its branches are normal in caliber.   The thoracic aorta normal in course and  caliber.Scattered atherosclerosis involving the thoracic aorta and branch vessels. 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 Moderate coronary artery calcifications are seen. Please note,the study is not optimized for evaluation of coronary arteries.   The cardiac chambers are not enlarged.   There is no pericardial effusion seen.   MEDIASTINUM AND SOCORRO, LOWER NECK AND AXILLA: The visualized thyroid gland is within normal limits. Similar pre-existing mediastinal and hilar lymph nodes with coarse calcifications. Esophagus appears within normal limits as seen.   LUNGS AND AIRWAYS: There is probable newly seen peripheral mucous plugging right lower lobe with adjacent newly seen subsegmental atelectasis.There is similar lung heterogeneity/mosaic attenuation. No evident consolidative pneumonia, edema or pleural effusion. There are scattered postinflammatory calcifications.   CHEST WALL AND LOWER NECK: Unremarkable.   ABDOMEN:   LIVER: Normal morphology. Scattered sharply marginated homogeneous hypodensities most compatible with cysts again seen.   BILE DUCTS: Normal caliber.   GALLBLADDER: Nondistended containing a radiopaque gallstone.   PANCREAS: The pancreas appears unremarkable without evidence of ductal dilatation or masses.   SPLEEN: Similar size with similar multifocal benign postinflammatory calcifications.   ADRENAL GLANDS: Within normal limits.   KIDNEYS AND URETERS: Normal morphology without suspicious mass or hydronephrosis. Similar probably benign bilateral renal cysts.   PELVIS:   BLADDER: The urinary bladder is decompressed, limited for evaluation. No evident mass or calculus.   REPRODUCTIVE ORGANS: Similar appearance mildly indenting the base urinary bladder.   BOWEL: Nondistended. No wall thickening or surrounding inflammatory changes or pneumatosis.   VESSELS: Scattered atherosclerosis. Fusiform dilation infrarenal aorta measures 2.9 cm AP diameter compared with 2.8 cm.    PERITONEUM/RETROPERITONEUM/LYMPH NODES: No ascites or free air, no fluid collection. No abdominopelvic lymphadenopathy is present.   BONE AND SOFT TISSUE: No acute osseous abnormalities. There are scattered degenerative changes. No acute abdominal wall abnormalities.       1. No evidence of acute pulmonary embolism to  proximal segmental level. Please note that, assessment of distal segmental and subsegmental branches is limited and small peripheral emboli are not entirely excluded. 2. Probable new hazy mucous plugging/impaction peripheral bronchi right lower lobe with adjacent subsegmental atelectasis potentially related to aspiration. 3. Fusiform infrarenal abdominal aortic bulge measures 2.9 cm AP diameter compared with 2.8 cm. 4. No separate acute detected abnormalities.   MACRO: None   Signed by: Hesham Davis 6/26/2024 6:37 PM Dictation workstation:   [id number]    CT angio chest for pulmonary embolism    Result Date: 6/26/2024  Interpreted By:  Hesham Davis, STUDY: CT ANGIO CHEST FOR PULMONARY EMBOLISM; CT ABDOMEN PELVIS W IV CONTRAST;  6/26/2024 5:34 pm   INDICATION: Signs/Symptoms:cough, abdominal pain; Signs/Symptoms:abdominal pain.   COMPARISON: Selected examinations as far back as July 15, 2021 CT chest abdomen and pelvis including August 29, 2022 chest CT and August 3, 2023 CT pulmonary angiogram   ACCESSION NUMBER(S): [id number]; [id number]   ORDERING CLINICIAN: LIZ NICHOLE   TECHNIQUE: Helical data acquisition of the chest was obtained after intravenous administration of 75 ML Omnipaque 350, as per PE protocol for the chest portion. Routine acquisitions of the abdomen and pelvis were obtained. Images were reformatted in coronal and sagittal planes. Axial and coronal maximum intensity projection (MIP) images of the chest were created and reviewed.   FINDINGS: POTENTIAL LIMITATIONS OF THE STUDY: Image degradation related to motion and beam hardening artifact from radiopaque structures.    HEART AND VESSELS: No discrete filling defects within the main pulmonary artery or its branches to  proximal segmental level. Please note that, assessment of distal segmental and subsegmental branches is limited and small peripheral emboli are not entirely excluded. Main pulmonary artery and its branches are normal in caliber.   The thoracic aorta normal in course and caliber.Scattered atherosclerosis involving the thoracic aorta and branch vessels. 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 Moderate coronary artery calcifications are seen. Please note,the study is not optimized for evaluation of coronary arteries.   The cardiac chambers are not enlarged.   There is no pericardial effusion seen.   MEDIASTINUM AND SOCORRO, LOWER NECK AND AXILLA: The visualized thyroid gland is within normal limits. Similar pre-existing mediastinal and hilar lymph nodes with coarse calcifications. Esophagus appears within normal limits as seen.   LUNGS AND AIRWAYS: There is probable newly seen peripheral mucous plugging right lower lobe with adjacent newly seen subsegmental atelectasis.There is similar lung heterogeneity/mosaic attenuation. No evident consolidative pneumonia, edema or pleural effusion. There are scattered postinflammatory calcifications.   CHEST WALL AND LOWER NECK: Unremarkable.   ABDOMEN:   LIVER: Normal morphology. Scattered sharply marginated homogeneous hypodensities most compatible with cysts again seen.   BILE DUCTS: Normal caliber.   GALLBLADDER: Nondistended containing a radiopaque gallstone.   PANCREAS: The pancreas appears unremarkable without evidence of ductal dilatation or masses.   SPLEEN: Similar size with similar multifocal benign postinflammatory calcifications.   ADRENAL GLANDS: Within normal limits.   KIDNEYS AND URETERS: Normal morphology without suspicious mass or hydronephrosis. Similar probably benign bilateral renal cysts.   PELVIS:   BLADDER: The urinary bladder  is decompressed, limited for evaluation. No evident mass or calculus.   REPRODUCTIVE ORGANS: Similar appearance mildly indenting the base urinary bladder.   BOWEL: Nondistended. No wall thickening or surrounding inflammatory changes or pneumatosis.   VESSELS: Scattered atherosclerosis. Fusiform dilation infrarenal aorta measures 2.9 cm AP diameter compared with 2.8 cm.   PERITONEUM/RETROPERITONEUM/LYMPH NODES: No ascites or free air, no fluid collection. No abdominopelvic lymphadenopathy is present.   BONE AND SOFT TISSUE: No acute osseous abnormalities. There are scattered degenerative changes. No acute abdominal wall abnormalities.       1. No evidence of acute pulmonary embolism to  proximal segmental level. Please note that, assessment of distal segmental and subsegmental branches is limited and small peripheral emboli are not entirely excluded. 2. Probable new hazy mucous plugging/impaction peripheral bronchi right lower lobe with adjacent subsegmental atelectasis potentially related to aspiration. 3. Fusiform infrarenal abdominal aortic bulge measures 2.9 cm AP diameter compared with 2.8 cm. 4. No separate acute detected abnormalities.   MACRO: None   Signed by: Hesham Davis 6/26/2024 6:37 PM Dictation workstation:   [id number]    CT head wo IV contrast    Result Date: 6/26/2024  Interpreted By:  Hesham Davis, STUDY: CT HEAD WO IV CONTRAST;  6/26/2024 5:28 pm   INDICATION: Signs/Symptoms:loss of consciousness.   COMPARISON: None.   ACCESSION NUMBER(S): [id number]   ORDERING CLINICIAN: LIZ NICHOLE   TECHNIQUE: Noncontrast axial CT scan of head was performed. Angled reformats in brain and bone windows were generated. The images were reviewed in bone, brain, blood and soft tissue windows.   FINDINGS: Similar prominence of the ventricles, sulci, and cisterns compatible with atrophy. Similar large right MCA distribution area of encephalomalacia with ex vacuo dilation of the right lateral ventricle.  Similar smaller area of encephalomalacia left frontal region. There are nonspecific low-density white matter changes. No detected acute intra or extra-axial fluid collection, mass, or mass effect.   Paranasal sinuses: Similar polypoid mucosal thickening or mucous retention cyst right sphenoid sinus.   Calvarium: Unremarkable.       No evident acute intracranial hemorrhage, mass, or mass effect for large territorial infarct.  If there is persistent concern for acute cerebral insult, MRI is recommended.     Similar chronic large area of encephalomalacia compatible with remote right MCA distribution infarct and smaller left MCA distribution infarct.   Chronic atrophy and nonspecific low-density white matter changes.   MACRO: None   Signed by: Hesham Davis 6/26/2024 6:19 PM Dictation workstation:   [id number]  .      Assessment/Plan   Principal Problem:    COVID-19  Active Problems:    Left hemiparesis (Multi)    Essential hypertension    Syncope and collapse    Aspiration pneumonia of right lower lobe (Multi)      Impression:  Syncope and worsening left-sided weakness, may be metabolic vs. New CVA  Current aspiration PNA and Covid+-on antibiotics  History of right PCA infarct in 2020-on DAPT and statin at home  Recommendations:   Will attempt to call wife for a better description of syncopal episode  Full cardiac work up with TTE, orthostatics and cardiac monitoring  MRI brain w/o    I have discussed the patient and the plan of care with the Neurologist on-call, Dr. Soni.       I spent 60 minutes in the professional and overall care of this patient.      Donna Lovelace, APRN-CNP

## 2024-06-27 NOTE — CONSULTS
Inpatient consult to Cardiology  Consult performed by: DAVID Hale-CNP  Consult ordered by: Cee Obrien MD      Cardiology Consult Note      Date:   6/27/2024  Patient name:  Lukas Shen  Date of admission:  6/26/2024  3:12 PM  MRN:   [id number]  YOB: 1950  Time of Consult:  8:27 AM    Consulting Cardiologist: DAVID Coyle, CNP  Primary Cardiologist:   None     Referring Provider: Dr Bliss      Admission Diagnosis:     COVID-19      History of Present Illness:      Lukas Shen is a 74 y.o.  male patient who is being at the request of Dr. Bliss for inpatient consultation of  syncope . He was admitted on 6/26/2024.  Previous [facility] and [facility] records have been reviewed in detail.    Patient with a history of CVA with left hemiparesis, hypertension, dyslipidemia, tobacco abuse, carotid stenosis  Patient states the last 2 days he has had a lot of cough and congestion and fatigue and not quite feeling like himself he states that he got up he was walking and he started to feel lightheaded and dizzy next thing he remembers is the paramedics above him his wife states that he had passed out for about 2 to 3 minutes he was jerky a complete syncopal event.  They brought him into the emergency department they did a COVID-19 test he was positive admitted him to the 10th floor and asked cardiology to get involved due to the syncopal event.  He states he is never had a cardiac workup that he did have some carotid stenosis in the past he states that he is seeing a vascular surgeon and the they felt it was still right around 5060% that he did not need any surgical intervention at that time and that was back in March 2024.  He states that about 7 or 8 years ago he had a syncopal event but he does not really recall much about it that he really did not have much of the workup he states though when it this did come on he was having a was coughing a lot and he did not  feel right.  No chest pain, fever, chills, PND, orthopnea, claudication  Telemetry is normal sinus rhythm with occasional PAC  EKG ordered  Troponin 7, 5    Cardiac history  None      Allergies:     No Known Allergies      Past Medical History:     Past Medical History:   Diagnosis Date    Depression due to old stroke 2023       Past Surgical History:     Past Surgical History:   Procedure Laterality Date    CT ANGIO NECK  2023    CT NECK ANGIO W AND WO IV CONTRAST 2023 [facility]    MR HEAD ANGIO WO IV CONTRAST  2020    MR HEAD ANGIO WO IV CONTRAST 2020 [facility] CLINICAL LEGACY    MR NECK ANGIO WO IV CONTRAST  2020    MR NECK ANGIO WO IV CONTRAST 2020 [facility] CLINICAL LEGACY    OTHER SURGICAL HISTORY  2021    Venous thrombectomy       Family History:     No family history on file.    Social History:     Social History     Tobacco Use    Smoking status: Former     Current packs/day: 0.00     Types: Cigarettes     Quit date: 2018     Years since quittin.4    Smokeless tobacco: Never   Vaping Use    Vaping status: Never Used   Substance Use Topics    Alcohol use: Not Currently    Drug use: Never       CURRENT INPATIENT MEDICATIONS    aspirin, 81 mg, oral, Daily  atorvastatin, 80 mg, oral, Nightly  azithromycin, 500 mg, intravenous, q24h  clopidogrel, 75 mg, oral, Daily  escitalopram, 10 mg, oral, Daily  pantoprazole, 40 mg, oral, Daily   Or  pantoprazole, 40 mg, intravenous, Daily  piperacillin-tazobactam, 3.375 g, intravenous, q8h   And  sodium chloride, 10 mL, intravenous, q8h         Current Outpatient Medications   Medication Instructions    albuterol 90 mcg/actuation inhaler inhalation    aspirin 81 mg chewable tablet oral    atorvastatin (LIPITOR) 80 mg, oral, Daily    clopidogrel (PLAVIX) 75 mg, oral, Daily    escitalopram (LEXAPRO) 10 mg, oral, Daily    esomeprazole (NEXIUM) 40 mg, oral, Daily        Review of Systems:      12 point review of systems was obtained in  detail and is negative other than that detailed above.    Vital Signs:     Vitals:    06/26/24 2014 06/26/24 2046 06/26/24 2303 06/27/24 0551   BP: 124/58 152/69 120/64 148/66   BP Location:  Right arm  Right arm   Patient Position:  Lying  Sitting   Pulse: 93 87 97 80   Resp: 20 20  18   Temp:  (!) 38.2 °C (100.8 °F) 37.2 °C (99 °F) 37.7 °C (99.9 °F)   TempSrc:  Temporal  Temporal   SpO2: 94% 95% 94% 95%   Weight:       Height:           Intake/Output Summary (Last 24 hours) at 6/27/2024 0827  Last data filed at 6/27/2024 0556  Gross per 24 hour   Intake --   Output 650 ml   Net -650 ml       Wt Readings from Last 4 Encounters:   06/26/24 78.9 kg (174 lb)   03/14/24 78 kg (172 lb)   02/15/24 77.6 kg (171 lb)   11/14/23 72.6 kg (160 lb)       Physical Examination:     GENERAL APPEARANCE: Awake and alert following commands  CHEST: Symmetric and non-tender.  INTEGUMENT: Skin warm and dry, without gross excoriationis or lesions.  HEENT: No gross abnormalities of conjunctiva, teeth, gums, oral mucosa  NECK: Supple, no JVD, no bruit. Thyroid not palpable. Carotid upstrokes normal.  NEURO/PSHCY: Awake and alert and oriented x 3.  Left-sided hemiparesis  LUNGS: Bilateral rhonchi  HEART: Rate and rhythm regular with no evident murmur; no gallop appreciated. There are no rubs, clicks or heaves. PMI nondisplaced.  ABDOMEN: Soft, nontender, no palpable hepatosplenomegaly, no mases, no bruits. Abdominal aorta not noted to be enlarged.  MUSCULOSKELETAL: History of left-sided hemiparesis from previous stroke  EXTREMITIES: Warm with good color, no clubbing or cyanois. There is no edema noted.  PERIPHERAL VASCULAR: Pulses present and equally palpable; 1+ throughout. No femoral bruits.      Lab:     CBC:   Results from last 7 days   Lab Units 06/27/24  0527 06/26/24  1539   WBC AUTO x10*3/uL 10.2 12.8*   RBC AUTO x10*6/uL 4.45* 4.64   HEMOGLOBIN g/dL 14.0 14.5   HEMATOCRIT % 42.0 42.4   MCV fL 94 91   MCH pg 31.5 31.3   MCHC g/dL  33.3 34.2   RDW % 12.5 12.7   PLATELETS AUTO x10*3/uL 206 230     CMP:    Results from last 7 days   Lab Units 06/27/24  0527 06/26/24  1539   SODIUM mmol/L 137 136   POTASSIUM mmol/L 4.4 4.1   CHLORIDE mmol/L 104 103   CO2 mmol/L 24 26   BUN mg/dL 16 18   CREATININE mg/dL 1.36* 1.35*   GLUCOSE mg/dL 101* 109*   PROTEIN TOTAL g/dL 6.6 7.1   CALCIUM mg/dL 8.9 9.1   BILIRUBIN TOTAL mg/dL 0.7 0.6   ALK PHOS U/L 95 113   AST U/L 15 13   ALT U/L 8* 11     BMP:    Results from last 7 days   Lab Units 06/27/24  0527 06/26/24  1539   SODIUM mmol/L 137 136   POTASSIUM mmol/L 4.4 4.1   CHLORIDE mmol/L 104 103   CO2 mmol/L 24 26   BUN mg/dL 16 18   CREATININE mg/dL 1.36* 1.35*   CALCIUM mg/dL 8.9 9.1   GLUCOSE mg/dL 101* 109*     Magnesium:  Results from last 7 days   Lab Units 06/26/24  1539   MAGNESIUM mg/dL 1.82     Troponin:    Results from last 7 days   Lab Units 06/26/24  1751 06/26/24  1539   TROPHS ng/L 5 7     BNP:   Results from last 7 days   Lab Units 06/26/24  1539   BNP pg/mL 26     Lipid Panel:         Diagnostic Studies:   @No results found for this or any previous visit.    CT abdomen pelvis w IV contrast    Result Date: 6/26/2024  Interpreted By:  Hesham Davis, STUDY: CT ANGIO CHEST FOR PULMONARY EMBOLISM; CT ABDOMEN PELVIS W IV CONTRAST;  6/26/2024 5:34 pm   INDICATION: Signs/Symptoms:cough, abdominal pain; Signs/Symptoms:abdominal pain.   COMPARISON: Selected examinations as far back as July 15, 2021 CT chest abdomen and pelvis including August 29, 2022 chest CT and August 3, 2023 CT pulmonary angiogram   ACCESSION NUMBER(S): [id number]; [id number]   ORDERING CLINICIAN: LIZ NICHOLE   TECHNIQUE: Helical data acquisition of the chest was obtained after intravenous administration of 75 ML Omnipaque 350, as per PE protocol for the chest portion. Routine acquisitions of the abdomen and pelvis were obtained. Images were reformatted in coronal and sagittal planes. Axial and coronal maximum intensity  projection (MIP) images of the chest were created and reviewed.   FINDINGS: POTENTIAL LIMITATIONS OF THE STUDY: Image degradation related to motion and beam hardening artifact from radiopaque structures.   HEART AND VESSELS: No discrete filling defects within the main pulmonary artery or its branches to  proximal segmental level. Please note that, assessment of distal segmental and subsegmental branches is limited and small peripheral emboli are not entirely excluded. Main pulmonary artery and its branches are normal in caliber.   The thoracic aorta normal in course and caliber.Scattered atherosclerosis involving the thoracic aorta and branch vessels. 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 Moderate coronary artery calcifications are seen. Please note,the study is not optimized for evaluation of coronary arteries.   The cardiac chambers are not enlarged.   There is no pericardial effusion seen.   MEDIASTINUM AND SOCORRO, LOWER NECK AND AXILLA: The visualized thyroid gland is within normal limits. Similar pre-existing mediastinal and hilar lymph nodes with coarse calcifications. Esophagus appears within normal limits as seen.   LUNGS AND AIRWAYS: There is probable newly seen peripheral mucous plugging right lower lobe with adjacent newly seen subsegmental atelectasis.There is similar lung heterogeneity/mosaic attenuation. No evident consolidative pneumonia, edema or pleural effusion. There are scattered postinflammatory calcifications.   CHEST WALL AND LOWER NECK: Unremarkable.   ABDOMEN:   LIVER: Normal morphology. Scattered sharply marginated homogeneous hypodensities most compatible with cysts again seen.   BILE DUCTS: Normal caliber.   GALLBLADDER: Nondistended containing a radiopaque gallstone.   PANCREAS: The pancreas appears unremarkable without evidence of ductal dilatation or masses.   SPLEEN: Similar size with similar multifocal benign postinflammatory calcifications.    ADRENAL GLANDS: Within normal limits.   KIDNEYS AND URETERS: Normal morphology without suspicious mass or hydronephrosis. Similar probably benign bilateral renal cysts.   PELVIS:   BLADDER: The urinary bladder is decompressed, limited for evaluation. No evident mass or calculus.   REPRODUCTIVE ORGANS: Similar appearance mildly indenting the base urinary bladder.   BOWEL: Nondistended. No wall thickening or surrounding inflammatory changes or pneumatosis.   VESSELS: Scattered atherosclerosis. Fusiform dilation infrarenal aorta measures 2.9 cm AP diameter compared with 2.8 cm.   PERITONEUM/RETROPERITONEUM/LYMPH NODES: No ascites or free air, no fluid collection. No abdominopelvic lymphadenopathy is present.   BONE AND SOFT TISSUE: No acute osseous abnormalities. There are scattered degenerative changes. No acute abdominal wall abnormalities.       1. No evidence of acute pulmonary embolism to  proximal segmental level. Please note that, assessment of distal segmental and subsegmental branches is limited and small peripheral emboli are not entirely excluded. 2. Probable new hazy mucous plugging/impaction peripheral bronchi right lower lobe with adjacent subsegmental atelectasis potentially related to aspiration. 3. Fusiform infrarenal abdominal aortic bulge measures 2.9 cm AP diameter compared with 2.8 cm. 4. No separate acute detected abnormalities.   MACRO: None   Signed by: Hesham Daivs 6/26/2024 6:37 PM Dictation workstation:   [id number]    CT angio chest for pulmonary embolism    Result Date: 6/26/2024  Interpreted By:  Hesham Davis, STUDY: CT ANGIO CHEST FOR PULMONARY EMBOLISM; CT ABDOMEN PELVIS W IV CONTRAST;  6/26/2024 5:34 pm   INDICATION: Signs/Symptoms:cough, abdominal pain; Signs/Symptoms:abdominal pain.   COMPARISON: Selected examinations as far back as July 15, 2021 CT chest abdomen and pelvis including August 29, 2022 chest CT and August 3, 2023 CT pulmonary angiogram   ACCESSION NUMBER(S):  [id number]; [id number]   ORDERING CLINICIAN: LIZ NICHOLE   TECHNIQUE: Helical data acquisition of the chest was obtained after intravenous administration of 75 ML Omnipaque 350, as per PE protocol for the chest portion. Routine acquisitions of the abdomen and pelvis were obtained. Images were reformatted in coronal and sagittal planes. Axial and coronal maximum intensity projection (MIP) images of the chest were created and reviewed.   FINDINGS: POTENTIAL LIMITATIONS OF THE STUDY: Image degradation related to motion and beam hardening artifact from radiopaque structures.   HEART AND VESSELS: No discrete filling defects within the main pulmonary artery or its branches to  proximal segmental level. Please note that, assessment of distal segmental and subsegmental branches is limited and small peripheral emboli are not entirely excluded. Main pulmonary artery and its branches are normal in caliber.   The thoracic aorta normal in course and caliber.Scattered atherosclerosis involving the thoracic aorta and branch vessels. 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 Moderate coronary artery calcifications are seen. Please note,the study is not optimized for evaluation of coronary arteries.   The cardiac chambers are not enlarged.   There is no pericardial effusion seen.   MEDIASTINUM AND OSCORRO, LOWER NECK AND AXILLA: The visualized thyroid gland is within normal limits. Similar pre-existing mediastinal and hilar lymph nodes with coarse calcifications. Esophagus appears within normal limits as seen.   LUNGS AND AIRWAYS: There is probable newly seen peripheral mucous plugging right lower lobe with adjacent newly seen subsegmental atelectasis.There is similar lung heterogeneity/mosaic attenuation. No evident consolidative pneumonia, edema or pleural effusion. There are scattered postinflammatory calcifications.   CHEST WALL AND LOWER NECK: Unremarkable.   ABDOMEN:   LIVER: Normal  morphology. Scattered sharply marginated homogeneous hypodensities most compatible with cysts again seen.   BILE DUCTS: Normal caliber.   GALLBLADDER: Nondistended containing a radiopaque gallstone.   PANCREAS: The pancreas appears unremarkable without evidence of ductal dilatation or masses.   SPLEEN: Similar size with similar multifocal benign postinflammatory calcifications.   ADRENAL GLANDS: Within normal limits.   KIDNEYS AND URETERS: Normal morphology without suspicious mass or hydronephrosis. Similar probably benign bilateral renal cysts.   PELVIS:   BLADDER: The urinary bladder is decompressed, limited for evaluation. No evident mass or calculus.   REPRODUCTIVE ORGANS: Similar appearance mildly indenting the base urinary bladder.   BOWEL: Nondistended. No wall thickening or surrounding inflammatory changes or pneumatosis.   VESSELS: Scattered atherosclerosis. Fusiform dilation infrarenal aorta measures 2.9 cm AP diameter compared with 2.8 cm.   PERITONEUM/RETROPERITONEUM/LYMPH NODES: No ascites or free air, no fluid collection. No abdominopelvic lymphadenopathy is present.   BONE AND SOFT TISSUE: No acute osseous abnormalities. There are scattered degenerative changes. No acute abdominal wall abnormalities.       1. No evidence of acute pulmonary embolism to  proximal segmental level. Please note that, assessment of distal segmental and subsegmental branches is limited and small peripheral emboli are not entirely excluded. 2. Probable new hazy mucous plugging/impaction peripheral bronchi right lower lobe with adjacent subsegmental atelectasis potentially related to aspiration. 3. Fusiform infrarenal abdominal aortic bulge measures 2.9 cm AP diameter compared with 2.8 cm. 4. No separate acute detected abnormalities.   MACRO: None   Signed by: Hesham Davis 6/26/2024 6:37 PM Dictation workstation:   [id number]    CT head wo IV contrast    Result Date: 6/26/2024  Interpreted By:  Hesham Davis, STUDY: CT  HEAD WO IV CONTRAST;  6/26/2024 5:28 pm   INDICATION: Signs/Symptoms:loss of consciousness.   COMPARISON: None.   ACCESSION NUMBER(S): [id number]   ORDERING CLINICIAN: LIZ NICHOLE   TECHNIQUE: Noncontrast axial CT scan of head was performed. Angled reformats in brain and bone windows were generated. The images were reviewed in bone, brain, blood and soft tissue windows.   FINDINGS: Similar prominence of the ventricles, sulci, and cisterns compatible with atrophy. Similar large right MCA distribution area of encephalomalacia with ex vacuo dilation of the right lateral ventricle. Similar smaller area of encephalomalacia left frontal region. There are nonspecific low-density white matter changes. No detected acute intra or extra-axial fluid collection, mass, or mass effect.   Paranasal sinuses: Similar polypoid mucosal thickening or mucous retention cyst right sphenoid sinus.   Calvarium: Unremarkable.       No evident acute intracranial hemorrhage, mass, or mass effect for large territorial infarct.  If there is persistent concern for acute cerebral insult, MRI is recommended.     Similar chronic large area of encephalomalacia compatible with remote right MCA distribution infarct and smaller left MCA distribution infarct.   Chronic atrophy and nonspecific low-density white matter changes.   MACRO: None   Signed by: Hesham Davis 6/26/2024 6:19 PM Dictation workstation:   [id number]      No echocardiogram results found for the past 14 days    Radiology:     CT abdomen pelvis w IV contrast   Final Result   1. No evidence of acute pulmonary embolism to  proximal segmental   level. Please note that, assessment of distal segmental and   subsegmental branches is limited and small peripheral emboli are not   entirely excluded.   2. Probable new hazy mucous plugging/impaction peripheral bronchi   right lower lobe with adjacent subsegmental atelectasis potentially   related to aspiration.   3. Fusiform infrarenal  abdominal aortic bulge measures 2.9 cm AP   diameter compared with 2.8 cm.   4. No separate acute detected abnormalities.        MACRO:   None        Signed by: Hesham Davis 6/26/2024 6:37 PM   Dictation workstation:   [id number]      CT angio chest for pulmonary embolism   Final Result   1. No evidence of acute pulmonary embolism to  proximal segmental   level. Please note that, assessment of distal segmental and   subsegmental branches is limited and small peripheral emboli are not   entirely excluded.   2. Probable new hazy mucous plugging/impaction peripheral bronchi   right lower lobe with adjacent subsegmental atelectasis potentially   related to aspiration.   3. Fusiform infrarenal abdominal aortic bulge measures 2.9 cm AP   diameter compared with 2.8 cm.   4. No separate acute detected abnormalities.        MACRO:   None        Signed by: Hesham Davis 6/26/2024 6:37 PM   Dictation workstation:   [id number]      CT head wo IV contrast   Final Result   No evident acute intracranial hemorrhage, mass, or mass effect for   large territorial infarct.  If there is persistent concern for acute   cerebral insult, MRI is recommended.             Similar chronic large area of encephalomalacia compatible with remote   right MCA distribution infarct and smaller left MCA distribution   infarct.        Chronic atrophy and nonspecific low-density white matter changes.        MACRO:   None        Signed by: Hesham Davis 6/26/2024 6:19 PM   Dictation workstation:   [id number]      Transthoracic Echo (TTE) Complete    (Results Pending)       Problem List:     Patient Active Problem List   Diagnosis    Left hemiparesis (Multi)    Gastroesophageal reflux disease    Essential hypertension    Pure hypercholesterolemia    Tobacco abuse, in remission    Carotid stenosis, right    COVID-19    Syncope and collapse    Aspiration pneumonia of right lower lobe (Multi)       Assessment:   Syncope  Aspiration  pneumonia  History of CVA  Carotid stenosis  Hypertension  Dyslipidemia      Plan:   Tele monitoring  Serial enzymes  2d echo  Daily EKG's  Aspirin 81 mg daily  Plavix 75 mg daily  Toprol-XL 25 mg daily  Lipitor 80 mg daily  Neurology input thank you  Orthostatic vital signs  Keep magnesium greater than 2.0  Continue IV antibiotic  Further recommendations per Dr. Sean Holland CNP  [facility]      Of note, this documentation is completed using the Dragon Dictation system (voice recognition software). There may be spelling and/or grammatical errors that were not corrected prior to final submission.      Electronically signed by DAVID Hale-CNP, on 6/27/2024 at 8:27 AM   Patient seen and examined in conjunction with DAVID Mckeon and agree with the evaluation as noted above.  74-year-old gentleman with history of prior CVA with left hemiparesis, hypertension, dyslipidemia carotid stenosis who presents with 2-day history of progressive cough and congestion in the chest as well as fatigue walks with found to be positive for COVID-19.  He apparently coughed so much that he passed out and cardiology was consulted for further evaluation for the syncope.  He denied any other symptoms such as chest pain or significant shortness of breath.  He denied any recent fever or chills.  His telemetry shows sinus rhythm with occasional PACs.    Agree with examination as noted above.  Cardiac exam reveals regular.  Second heart sound, no murmurs are heard.    ASSESSMENT AND PLAN:  1.  Presyncope/syncope: This is most likely vasovagal likely related to persistent and very strong coughing bouts.  Cardiac telemetry so far is unrevealing of any significant arrhythmias.  He has an echocardiogram pending to rule out any significant cardiac structural abnormalities.  Agree with current supportive treatment for his COVID-19 infection, hydration, and will make  further recommendations based on results of the echocardiogram.  2.  COVID-19 infection, will defer to primary team for continued management.  3.  S/p CVA with left-sided residuals, continue to optimize blood pressure control.  AKA

## 2024-06-27 NOTE — PROGRESS NOTES
Physical Therapy                 Therapy Communication Note    Patient Name: Lukas Shen  MRN: [id number]  Today's Date: 6/27/2024     Discipline: Physical Therapy    Missed Visit Reason: Received order for P.T. eval. Chart reviewed. Attempted to see Pt. for P.T. eval. but he was receiving a echo in his room.  Will re-attempt P.T. eval when Pt. is available.

## 2024-06-27 NOTE — PROGRESS NOTES
06/27/24 0924   Discharge Planning   Living Arrangements Spouse/significant other   Support Systems Spouse/significant other   Assistance Needed home care possible   Type of Residence Private residence   Do you have animals or pets at home? No   Who is requesting discharge planning? Provider   Home or Post Acute Services In home services   Type of Home Care Services Home OT;Home PT;Home nursing visits   Patient expects to be discharged to: Home with hhc   Does the patient need discharge transport arranged? No   Patient Choice   Provider Choice list and CMS website (https://medicare.gov/care-compare#search) for post-acute Quality and Resource Measure Data were provided and reviewed with: Family   Patient / Family choosing to utilize agency / facility established prior to hospitalization No     Patient is from home, lives with spouse, plan will be to return home. Will speak with wife regarding assistance at home and need for HHC. Patient admitted with COVID, will continue to follow for further discharge needs. Spoke with patient's wife Letitia, she is open to Home Care if needed, would prefer to stay in the  system, PCP is Dr. Roe. Still provided list for patient and wife to choose, will leave in room for wife.

## 2024-06-27 NOTE — PROGRESS NOTES
Physical Therapy    Physical Therapy Evaluation    Patient Name: Lukas Shen  MRN: [id number]  Today's Date: 6/27/2024   Time Calculation  Start Time: 1410  Stop Time: 1425  Time Calculation (min): 15 min  1014/1014-A    Assessment/Plan   PT Assessment  PT Assessment Results: Decreased strength, Decreased endurance, Impaired balance, Decreased mobility, Decreased range of motion  Rehab Prognosis: Good  Evaluation/Treatment Tolerance: Patient tolerated treatment well  Medical Staff Made Aware: Yes  Strengths: Support of Caregivers, Living arrangement secure, Housing layout  Barriers to Participation: Comorbidities  End of Session Communication: Bedside nurse  End of Session Patient Position: Bed, 2 rail up, Alarm on (Call light within reach)  IP OR SWING BED PT PLAN  Inpatient or Swing Bed: Inpatient  PT Plan  Treatment/Interventions: Bed mobility, Transfer training, Gait training, Balance training, Strengthening, Endurance training, Therapeutic exercise  PT Plan: Ongoing PT  PT Frequency: 3 times per week  PT Discharge Recommendations: Low intensity level of continued care, High intensity level of continued care (24/7 A at home)  PT Recommended Transfer Status: Assistive device, Assist x2  Physical Therapy eval completed per MD requisition. P.T. recommendations as outlined above. Recommend D/C from acute care when medically appropriate as deemed by medical staff.    Subjective           General Visit Information:  General  Reason for Referral: impaired mobility  Referred By: Dr. Bliss (PT/OT 6/27)  Past Medical History Relevant to Rehab: includes: R CVA with L sided weakness. HTN, HLD, depression, pneumonia, carotid stenosis, venous thrombectomy,  Missed Visit: Yes  Missed Visit Reason: Patient in a medical procedure  Family/Caregiver Present: No  Prior to Session Communication: Bedside nurse  Patient Position Received: Bed, 2 rail up, Alarm off, not on at start of session  Preferred Learning Style: auditory,  verbal  General Comment: Pt. is a 73yo who presented to [facility] ED on 6/26/2024 with c/o abdominal pain with nausea x 1 day, productive cough with sputim x 1 day, unresponsiveness x 3 minutes at home per spouse. EMS also reports (+) LOC. In ED, O2 91%, Pt tested (+) for Covid.    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 Tub shower with a tub transfer bench.    Prior Level of Function:  Prior Function Per Pt/Caregiver Report  Prior Function Comments: Patient ambulates at mod I level with a FWW in the house, owns a cane and uses a W/C in the community. Requires assistance with ADLs and IADLs. Denies falls in the past 3 months. Patient does not drive, family assists with transportation.    Precautions:  Precautions  Medical Precautions: Infection precautions (Contact+ due to Covid; Activity order: No restrictions)  Precautions Comment: Per EMR: High fall risk      Objective     Pain:  Pain Assessment  Pain Assessment: 0-10  0-10 (Numeric) Pain Score: 0 - No pain    Cognition:  Cognition  Overall Cognitive Status: Within Functional Limits  Orientation Level: Oriented X4    General Assessments:  General Observation  General Observation: IV, tele, Otto   Activity Tolerance  Endurance: Tolerates 10 - 20 min exercise with multiple rests                 Dynamic Sitting Balance  Dynamic Sitting-Comments: Good static and dynamic sitting balance  Dynamic Standing Balance  Dynamic Standing-Comments: Fair+ static and dynamic standing balance    Functional Assessments:     Bed Mobility  Bed Mobility: Yes  Bed Mobility 1  Bed  Mobility 1: Supine to sitting  Level of Assistance 1: Minimum assistance  Bed Mobility Comments 1: A to scoot hips to EOB  Transfers  Transfer: Yes  Transfer 1  Technique 1: Sit to stand  Transfer Device 1:  (FWW)  Transfer Level of Assistance 1: Minimum assistance  Trials/Comments 1: A for lifting, transferring weight over feet, steadying. Pt. placed L hand on FWW prior to standing. VC's to push up with R hand  Transfers 2  Technique 2: Stand to sit  Trials/Comments 2: A to control descent. VC's for hand placement.  Transfers 3  Technique 3: Stand pivot  Transfer Device 3:  (FWW)  Transfer Level of Assistance 3: Contact guard  Trials/Comments 3: CGA for safety  Ambulation/Gait Training  Ambulation/Gait Training Performed: Yes  Ambulation/Gait Training 1  Surface 1: Level tile  Device 1: Rolling walker  Assistance 1: Minimum assistance  Quality of Gait 1: Narrow base of support (slow, step-to gait with shortened step lengths)  Comments/Distance (ft) 1: 20'; A for balance, safety. VC's for directions  Stairs  Stairs: No       Extremity/Trunk Assessments:        RLE   RLE : Within Functional Limits  LLE   LLE :  (AROM WFL, except ankle DF -5 degrees, knee ext -10 degrees; strength 4/5 except ankle DF 4-/5)    Outcome Measures:     [facility] Basic Mobility  Turning from your back to your side while in a flat bed without using bedrails: A little  Moving from lying on your back to sitting on the side of a flat bed without using bedrails: A little  Moving to and from bed to chair (including a wheelchair): A little  Standing up from a chair using your arms (e.g. wheelchair or bedside chair): A little  To walk in hospital room: A little  Climbing 3-5 steps with railing: Total  Basic Mobility - Total Score: 16                                        Goals:  Encounter Problems       Encounter Problems (Active)       PT Problem       Pt. will transfer supine/sit with supervision (Progressing)       Start:  06/27/24    Expected End:   07/11/24            Pt. will transfer sit/stand with FWW with SBA (Progressing)       Start:  06/27/24    Expected End:  07/11/24            Pt.will ambulate 50' with FWW with SBA (Progressing)       Start:  06/27/24    Expected End:  07/11/24            Pt. will perform 2 x 15 B LE AROM exercises  (Not Progressing)       Start:  06/27/24    Expected End:  07/11/24                   Education Documentation  Mobility Training, taught by Oscar Bernal, PT at 6/27/2024  2:54 PM.  Learner: Patient  Readiness: Acceptance  Method: Explanation  Response: Verbalizes Understanding, Needs Reinforcement  Comment: Role of PT, transfers, amb, safety, PT POC

## 2024-06-28 ENCOUNTER — APPOINTMENT (OUTPATIENT)
Dept: CARDIOLOGY | Facility: HOSPITAL | Age: 74
End: 2024-06-28
Payer: MEDICARE

## 2024-06-28 ENCOUNTER — DOCUMENTATION (OUTPATIENT)
Dept: HOME HEALTH SERVICES | Facility: HOME HEALTH | Age: 74
End: 2024-06-28
Payer: MEDICARE

## 2024-06-28 ENCOUNTER — HOME HEALTH ADMISSION (OUTPATIENT)
Dept: HOME HEALTH SERVICES | Facility: HOME HEALTH | Age: 74
End: 2024-06-28
Payer: MEDICARE

## 2024-06-28 VITALS
HEART RATE: 60 BPM | WEIGHT: 174 LBS | OXYGEN SATURATION: 96 % | SYSTOLIC BLOOD PRESSURE: 119 MMHG | RESPIRATION RATE: 17 BRPM | DIASTOLIC BLOOD PRESSURE: 55 MMHG | HEIGHT: 71 IN | TEMPERATURE: 98.2 F | BODY MASS INDEX: 24.36 KG/M2

## 2024-06-28 LAB
ANION GAP SERPL CALC-SCNC: 12 MMOL/L (ref 10–20)
ATRIAL RATE: 70 BPM
BASOPHILS # BLD AUTO: 0.02 X10*3/UL (ref 0–0.1)
BASOPHILS NFR BLD AUTO: 0.2 %
BUN SERPL-MCNC: 16 MG/DL (ref 6–23)
CALCIUM SERPL-MCNC: 8.6 MG/DL (ref 8.6–10.3)
CHLORIDE SERPL-SCNC: 106 MMOL/L (ref 98–107)
CO2 SERPL-SCNC: 24 MMOL/L (ref 21–32)
CREAT SERPL-MCNC: 1.23 MG/DL (ref 0.5–1.3)
EGFRCR SERPLBLD CKD-EPI 2021: 62 ML/MIN/1.73M*2
EOSINOPHIL # BLD AUTO: 0 X10*3/UL (ref 0–0.4)
EOSINOPHIL NFR BLD AUTO: 0 %
ERYTHROCYTE [DISTWIDTH] IN BLOOD BY AUTOMATED COUNT: 12.3 % (ref 11.5–14.5)
GLUCOSE SERPL-MCNC: 128 MG/DL (ref 74–99)
HCT VFR BLD AUTO: 40.2 % (ref 41–52)
HGB BLD-MCNC: 13.8 G/DL (ref 13.5–17.5)
HOLD SPECIMEN: NORMAL
HOLD SPECIMEN: NORMAL
IMM GRANULOCYTES # BLD AUTO: 0.05 X10*3/UL (ref 0–0.5)
IMM GRANULOCYTES NFR BLD AUTO: 0.5 % (ref 0–0.9)
LYMPHOCYTES # BLD AUTO: 0.9 X10*3/UL (ref 0.8–3)
LYMPHOCYTES NFR BLD AUTO: 9.7 %
MAGNESIUM SERPL-MCNC: 2.19 MG/DL (ref 1.6–2.4)
MCH RBC QN AUTO: 31.5 PG (ref 26–34)
MCHC RBC AUTO-ENTMCNC: 34.3 G/DL (ref 32–36)
MCV RBC AUTO: 92 FL (ref 80–100)
MONOCYTES # BLD AUTO: 0.63 X10*3/UL (ref 0.05–0.8)
MONOCYTES NFR BLD AUTO: 6.8 %
NEUTROPHILS # BLD AUTO: 7.67 X10*3/UL (ref 1.6–5.5)
NEUTROPHILS NFR BLD AUTO: 82.8 %
NRBC BLD-RTO: 0 /100 WBCS (ref 0–0)
P AXIS: 68 DEGREES
P OFFSET: 175 MS
P ONSET: 125 MS
PLATELET # BLD AUTO: 220 X10*3/UL (ref 150–450)
POTASSIUM SERPL-SCNC: 4 MMOL/L (ref 3.5–5.3)
PR INTERVAL: 196 MS
PROCALCITONIN SERPL-MCNC: 0.06 NG/ML
Q ONSET: 223 MS
QRS COUNT: 12 BEATS
QRS DURATION: 92 MS
QT INTERVAL: 416 MS
QTC CALCULATION(BAZETT): 449 MS
QTC FREDERICIA: 438 MS
R AXIS: 26 DEGREES
RBC # BLD AUTO: 4.38 X10*6/UL (ref 4.5–5.9)
SODIUM SERPL-SCNC: 138 MMOL/L (ref 136–145)
T AXIS: 59 DEGREES
T OFFSET: 431 MS
VENTRICULAR RATE: 70 BPM
WBC # BLD AUTO: 9.3 X10*3/UL (ref 4.4–11.3)

## 2024-06-28 PROCEDURE — 97530 THERAPEUTIC ACTIVITIES: CPT | Mod: GP,CQ | Performed by: PHYSICAL THERAPY ASSISTANT

## 2024-06-28 PROCEDURE — 84145 PROCALCITONIN (PCT): CPT | Mod: ELYLAB | Performed by: STUDENT IN AN ORGANIZED HEALTH CARE EDUCATION/TRAINING PROGRAM

## 2024-06-28 PROCEDURE — 82374 ASSAY BLOOD CARBON DIOXIDE: CPT | Performed by: STUDENT IN AN ORGANIZED HEALTH CARE EDUCATION/TRAINING PROGRAM

## 2024-06-28 PROCEDURE — 93005 ELECTROCARDIOGRAM TRACING: CPT

## 2024-06-28 PROCEDURE — 99232 SBSQ HOSP IP/OBS MODERATE 35: CPT

## 2024-06-28 PROCEDURE — 2500000004 HC RX 250 GENERAL PHARMACY W/ HCPCS (ALT 636 FOR OP/ED): Performed by: STUDENT IN AN ORGANIZED HEALTH CARE EDUCATION/TRAINING PROGRAM

## 2024-06-28 PROCEDURE — 85025 COMPLETE CBC W/AUTO DIFF WBC: CPT | Performed by: STUDENT IN AN ORGANIZED HEALTH CARE EDUCATION/TRAINING PROGRAM

## 2024-06-28 PROCEDURE — 83735 ASSAY OF MAGNESIUM: CPT | Performed by: NURSE PRACTITIONER

## 2024-06-28 PROCEDURE — 99233 SBSQ HOSP IP/OBS HIGH 50: CPT | Performed by: INTERNAL MEDICINE

## 2024-06-28 PROCEDURE — 2500000001 HC RX 250 WO HCPCS SELF ADMINISTERED DRUGS (ALT 637 FOR MEDICARE OP): Performed by: INTERNAL MEDICINE

## 2024-06-28 PROCEDURE — 2500000004 HC RX 250 GENERAL PHARMACY W/ HCPCS (ALT 636 FOR OP/ED): Performed by: INTERNAL MEDICINE

## 2024-06-28 PROCEDURE — 2500000001 HC RX 250 WO HCPCS SELF ADMINISTERED DRUGS (ALT 637 FOR MEDICARE OP): Performed by: NURSE PRACTITIONER

## 2024-06-28 PROCEDURE — 97116 GAIT TRAINING THERAPY: CPT | Mod: GP,CQ | Performed by: PHYSICAL THERAPY ASSISTANT

## 2024-06-28 PROCEDURE — 36415 COLL VENOUS BLD VENIPUNCTURE: CPT | Performed by: NURSE PRACTITIONER

## 2024-06-28 PROCEDURE — 99239 HOSP IP/OBS DSCHRG MGMT >30: CPT | Performed by: STUDENT IN AN ORGANIZED HEALTH CARE EDUCATION/TRAINING PROGRAM

## 2024-06-28 RX ORDER — METOPROLOL SUCCINATE 25 MG/1
25 TABLET, EXTENDED RELEASE ORAL DAILY
Qty: 30 TABLET | Refills: 11 | Status: SHIPPED | OUTPATIENT
Start: 2024-06-29 | End: 2025-06-29

## 2024-06-28 ASSESSMENT — COGNITIVE AND FUNCTIONAL STATUS - GENERAL
WALKING IN HOSPITAL ROOM: A LITTLE
MOVING FROM LYING ON BACK TO SITTING ON SIDE OF FLAT BED WITH BEDRAILS: A LITTLE
MOVING TO AND FROM BED TO CHAIR: A LITTLE
CLIMB 3 TO 5 STEPS WITH RAILING: TOTAL
TURNING FROM BACK TO SIDE WHILE IN FLAT BAD: A LITTLE
STANDING UP FROM CHAIR USING ARMS: A LITTLE
MOBILITY SCORE: 16

## 2024-06-28 NOTE — PROGRESS NOTES
Occupational Therapy                 Therapy Communication Note    Patient Name: Lukas Shen  MRN: [id number]  Today's Date: 6/28/2024     Discipline: Occupational Therapy    Missed Visit Reason: Patient refused (declined, states going home. max encouragement given)

## 2024-06-28 NOTE — HH CARE COORDINATION
Home Care received a Referral for Nursing, Physical Therapy, and Occupational Therapy. We have processed the referral for a Start of Care on 06/30/2024.     If you have any questions or concerns, please feel free to contact us at [phone]. Follow the prompts, enter your five digit zip code, and you will be directed to your care team on [facility].

## 2024-06-28 NOTE — PROGRESS NOTES
06/28/24 1506   Current Planned Discharge Disposition   Current Planned Discharge Disposition Home H     Patient to discharge home, per wife she manages patient's care, was open to home care and wanted . [facility] will call patient with soc date which will be 6/29 or 6/30.

## 2024-06-28 NOTE — PROGRESS NOTES
"Lukas Shen is a 74 y.o. male on day 2 of admission presenting with COVID-19.      Subjective   Pt. In bed, MRI brain did not show any new infarct or bleed. His left-sided weakness has seemed to return to baseline. Orthostatic BP ordered. He continues on antibiotics for Covid PNA.   Review of systems are negative unless otherwise specified in HPI.        Objective     Last Recorded Vitals  Blood pressure 119/55, pulse 60, temperature 36.8 °C (98.2 °F), temperature source Temporal, resp. rate 17, height 1.803 m (5' 11\"), weight 78.9 kg (174 lb), SpO2 96%.      Relevant Results  Results for orders placed or performed during the hospital encounter of 06/26/24 (from the past 24 hour(s))   Magnesium   Result Value Ref Range    Magnesium 2.19 1.60 - 2.40 mg/dL   Procalcitonin   Result Value Ref Range    Procalcitonin 0.06 <=0.07 ng/mL   SST TOP   Result Value Ref Range    Extra Tube Hold for add-ons.    Lavender Top   Result Value Ref Range    Extra Tube Hold for add-ons.    CBC and Auto Differential   Result Value Ref Range    WBC 9.3 4.4 - 11.3 x10*3/uL    nRBC 0.0 0.0 - 0.0 /100 WBCs    RBC 4.38 (L) 4.50 - 5.90 x10*6/uL    Hemoglobin 13.8 13.5 - 17.5 g/dL    Hematocrit 40.2 (L) 41.0 - 52.0 %    MCV 92 80 - 100 fL    MCH 31.5 26.0 - 34.0 pg    MCHC 34.3 32.0 - 36.0 g/dL    RDW 12.3 11.5 - 14.5 %    Platelets 220 150 - 450 x10*3/uL    Neutrophils % 82.8 40.0 - 80.0 %    Immature Granulocytes %, Automated 0.5 0.0 - 0.9 %    Lymphocytes % 9.7 13.0 - 44.0 %    Monocytes % 6.8 2.0 - 10.0 %    Eosinophils % 0.0 0.0 - 6.0 %    Basophils % 0.2 0.0 - 2.0 %    Neutrophils Absolute 7.67 (H) 1.60 - 5.50 x10*3/uL    Immature Granulocytes Absolute, Automated 0.05 0.00 - 0.50 x10*3/uL    Lymphocytes Absolute 0.90 0.80 - 3.00 x10*3/uL    Monocytes Absolute 0.63 0.05 - 0.80 x10*3/uL    Eosinophils Absolute 0.00 0.00 - 0.40 x10*3/uL    Basophils Absolute 0.02 0.00 - 0.10 x10*3/uL   Basic metabolic panel   Result Value Ref Range    " Glucose 128 (H) 74 - 99 mg/dL    Sodium 138 136 - 145 mmol/L    Potassium 4.0 3.5 - 5.3 mmol/L    Chloride 106 98 - 107 mmol/L    Bicarbonate 24 21 - 32 mmol/L    Anion Gap 12 10 - 20 mmol/L    Urea Nitrogen 16 6 - 23 mg/dL    Creatinine 1.23 0.50 - 1.30 mg/dL    eGFR 62 >60 mL/min/1.73m*2    Calcium 8.6 8.6 - 10.3 mg/dL   ECG 12 Lead   Result Value Ref Range    Ventricular Rate 70 BPM    Atrial Rate 70 BPM    ND Interval 196 ms    QRS Duration 92 ms    QT Interval 416 ms    QTC Calculation(Bazett) 449 ms    P Axis 68 degrees    R Axis 26 degrees    T Axis 59 degrees    QRS Count 12 beats    Q Onset 223 ms    P Onset 125 ms    P Offset 175 ms    T Offset 431 ms    QTC Fredericia 438 ms   ECG 12 Lead    Result Date: 6/28/2024  Normal sinus rhythm Normal ECG When compared with ECG of 27-JUN-2024 09:09, No significant change was found    MR brain wo IV contrast    Result Date: 6/27/2024  Interpreted By:  Natanael Akbar, STUDY: MR BRAIN WO IV CONTRAST;  6/27/2024 8:43 pm   INDICATION: Signs/Symptoms:left sided weakness.   COMPARISON: 06/26/2024 CT head without contrast   ACCESSION NUMBER(S): [id number]   ORDERING CLINICIAN: SHWETA BENSON   TECHNIQUE: Multiplanar, multi-sequence images of the brain were obtained without contrast.   FINDINGS: Diffusion weighted images show no evidence of acute ischemic infarct.   Loss of flow void within the right intracranial petrous and cavernous ICA.   Large area of encephalomalacia in the right occipital lobe, right temporal lobe, right basal ganglia and insula. Moderate periventricular and subcortical hemispheric T2/FLAIR white matter hyperintensities are most compatible with chronic small vessel ischemic disease. Chronic lacunar infarct right cerebellar hemisphere.   There is no acute intraparenchymal hemorrhage, mass, mass-effect, or an extra-axial fluid collection. Subcentimeter focus of blooming artifact in the right frontal cortical/subcortical parenchyma likely represents  remote microhemorrhage.   Expected dilatation of the right lateral ventricle due to volume loss.   Normal morphology of midline structures. The craniovertebral junction is normal.   The orbits and globes are unremarkable.   The paranasal sinuses show no air-fluid levels or hemorrhage.   The mastoid air cells are clear.       No acute ischemic infarct, acute hemorrhage, or intracranial mass effect.   Chronic large territory infarct involving the right temporal lobe, occipital lobe, right insula and basal ganglia in addition to chronic small vessel ischemic changes as described above.   MACRO: None.   Signed by: Natanael Akbar 6/27/2024 9:23 PM Dictation workstation:   [id number]    Transthoracic Echo (TTE) Complete    Result Date: 6/27/2024          [place] [address]  Tel [phone] Fax [phone] TRANSTHORACIC ECHOCARDIOGRAM REPORT Patient Name:      HARRY ALLEN REGINO        Reading Physician:    [id number] Vic Rossi MD, [facility] Study Date:        6/27/2024             Ordering Provider:    [id number] TRACE QUINTANA MRN/PID:           [id number]              Fellow: Accession#:        [id number]          Nurse: Date of Birth/Age: 1950 / 74 years  Sonographer:          Jeny Cooper RDCS Gender:            M                     Additional Staff: Height:            180.34 cm             Admit Date:           6/26/2024 Weight:            78.93 kg              Admission Status:     Inpatient -                                                                Routine BSA / BMI:         1.99 m2 / 24.27 kg/m2 Department Location:  [address] [place] [address] Blood Pressure: 148 /66 mmHg Study Type:    TRANSTHORACIC ECHO (TTE) COMPLETE Diagnosis/ICD: Syncope and collapse-R55; [id number] Indication:    Syncope  CPT Codes:     Echo Complete w Full [id number] Patient History: Smoker:            Former. Pertinent History: HTN, Hyperlipidemia, Syncope and CVA. Study Detail: The following Echo studies were performed: 2D, M-Mode, Doppler and               color flow. Agitated saline used as a contrast agent for               intraseptal flow evaluation. The patient was awake.  PHYSICIAN INTERPRETATION: Left Ventricle: The left ventricular systolic function is normal, with a visually estimated ejection fraction of 55-60%. There are no regional wall motion abnormalities. The left ventricular cavity size is normal. The left ventricular septal wall thickness is normal. There is normal left ventricular posterior wall thickness. Spectral Doppler shows an impaired relaxation pattern of left ventricular diastolic filling. Left Atrium: The left atrium is normal in size. A bubble study using agitated saline was performed. Bubble study is negative. Left atrial volume index 12.5 mL/m2. Saline bubble contrast study is negative. Right Ventricle: The right ventricle is normal in size. There is normal right ventricular global systolic function. Normal right ventricular chamber size and function. Right Atrium: The right atrium is normal in size. Aortic Valve: The aortic valve is trileaflet. There is mild to moderate aortic valve cusp calcification. The aortic valve dimensionless index is 0.90. There is no evidence of aortic valve regurgitation. The peak instantaneous gradient of the aortic valve is 8.9 mmHg. The mean gradient of the aortic valve is 6.0 mmHg. Mitral Valve: The mitral valve is normal in structure. There is trace mitral valve regurgitation. Tricuspid Valve: The tricuspid valve is structurally normal. There is trace tricuspid regurgitation. The right ventricular systolic pressure is unable to be estimated. Trivial tricuspid regurgitation. Pulmonic Valve: The pulmonic valve is structurally normal. There is no indication of  pulmonic valve regurgitation. Pericardium: There is no pericardial effusion noted. Aorta: The aortic root is normal. Systemic Veins: The inferior vena cava appears to be of normal size. There is IVC inspiratory collapse greater than 50%.  CONCLUSIONS:  1. The left ventricular systolic function is normal, with a visually estimated ejection fraction of 55-60%.  2. Spectral Doppler shows an impaired relaxation pattern of left ventricular diastolic filling.  3. Normal right ventricular chamber size and function.  4. There is normal right ventricular global systolic function.  5. Left atrial volume index 12.5 mL/m2.     Saline bubble contrast study is negative.  6. Trivial tricuspid regurgitation.  7. Comparison study dated August 4, 2023 showed normal LV systolic function and trivial mitral regurgitation. QUANTITATIVE DATA SUMMARY: 2D MEASUREMENTS:                          Normal Ranges: Ao Root d:     3.34 cm   (2.0-3.7cm) LAs:           3.45 cm   (2.7-4.0cm) IVSd:          0.88 cm   (0.6-1.1cm) LVPWd:         0.96 cm   (0.6-1.1cm) LVIDd:         4.10 cm   (3.9-5.9cm) LVIDs:         2.83 cm LV Mass Index: 59.4 g/m2 LV % FS        31.0 % LA VOLUME:                               Normal Ranges: LA Vol A4C:        20.7 ml    (22+/-6mL/m2) LA Vol A2C:        24.8 ml LA Vol BP:         23.6 ml LA Vol Index A4C:  10.4ml/m2 LA Vol Index A2C:  12.5 ml/m2 LA Vol Index BP:   11.9 ml/m2 LA Area A4C:       10.7 cm2 LA Area A2C:       12.2 cm2 LA Major Axis A4C: 4.7 cm LA Major Axis A2C: 5.1 cm LA Volume Index:   11.4 ml/m2 RA VOLUME BY A/L METHOD:                               Normal Ranges: RA Vol A4C:        30.3 ml    (8.3-19.5ml) RA Vol Index A4C:  15.2 ml/m2 RA Area A4C:       13.0 cm2 RA Major Axis A4C: 4.7 cm AORTA MEASUREMENTS:                    Normal Ranges: Asc Ao, d: 3.34 cm (2.1-3.4cm) LV SYSTOLIC FUNCTION BY 2D PLANIMETRY (MOD):                      Normal Ranges: EF-A4C View:    61 % (>=55%) EF-A2C View:    62 %  EF-Biplane:     60 % EF-Visual:      58 % LV EF Reported: 58 % LV DIASTOLIC FUNCTION:                         Normal Ranges: MV Peak E:    0.84 m/s  (0.7-1.2 m/s) MV Peak A:    0.74 m/s  (0.42-0.7 m/s) E/A Ratio:    1.13      (1.0-2.2) MV e'         0.083 m/s (>8.0) MV lateral e' 0.08 m/s MV medial e'  0.08 m/s E/e' Ratio:   10.17     (<8.0) MITRAL VALVE:                 Normal Ranges: MV DT: 290 msec (150-240msec) AORTIC VALVE:                                   Normal Ranges: AoV Vmax:                1.49 m/s (<=1.7m/s) AoV Peak P.9 mmHg (<20mmHg) AoV Mean P.0 mmHg (1.7-11.5mmHg) LVOT Max Robert:            1.46 m/s (<=1.1m/s) AoV VTI:                 29.60 cm (18-25cm) LVOT VTI:                26.60 cm LVOT Diameter:           2.24 cm  (1.8-2.4cm) AoV Area, VTI:           3.54 cm2 (2.5-5.5cm2) AoV Area,Vmax:           3.86 cm2 (2.5-4.5cm2) AoV Dimensionless Index: 0.90  RIGHT VENTRICLE: RV Basal 3.02 cm RV Mid   2.47 cm RV Major 6.5 cm TAPSE:   23.9 mm RV s'    0.14 m/s TRICUSPID VALVE/RVSP:                   Normal Ranges: IVC Diam: 1.28 cm PULMONIC VALVE:                         Normal Ranges: PV Accel Time: 79 msec  (>120ms) PV Max Robert:    0.8 m/s  (0.6-0.9m/s) PV Max P.6 mmHg  [id number] Vic Rossi MD, FACC Electronically signed on 2024 at 4:24:14 PM  ** Final **     ECG 12 Lead    Result Date: 2024  Normal sinus rhythm Normal ECG When compared with ECG of 03-AUG-2023 18:48, Questionable change in QRS axis Confirmed by Kaia Olson ([id number]) on 2024 3:52:39 PM    CT abdomen pelvis w IV contrast    Result Date: 2024  Interpreted By:  Hesham Davis, STUDY: CT ANGIO CHEST FOR PULMONARY EMBOLISM; CT ABDOMEN PELVIS W IV CONTRAST;  2024 5:34 pm   INDICATION: Signs/Symptoms:cough, abdominal pain; Signs/Symptoms:abdominal pain.   COMPARISON: Selected examinations as far back as July 15, 2021 CT chest abdomen and pelvis including 2022 chest CT and  August 3, 2023 CT pulmonary angiogram   ACCESSION NUMBER(S): [id number]; [id number]   ORDERING CLINICIAN: LIZ NICHOLE   TECHNIQUE: Helical data acquisition of the chest was obtained after intravenous administration of 75 ML Omnipaque 350, as per PE protocol for the chest portion. Routine acquisitions of the abdomen and pelvis were obtained. Images were reformatted in coronal and sagittal planes. Axial and coronal maximum intensity projection (MIP) images of the chest were created and reviewed.   FINDINGS: POTENTIAL LIMITATIONS OF THE STUDY: Image degradation related to motion and beam hardening artifact from radiopaque structures.   HEART AND VESSELS: No discrete filling defects within the main pulmonary artery or its branches to  proximal segmental level. Please note that, assessment of distal segmental and subsegmental branches is limited and small peripheral emboli are not entirely excluded. Main pulmonary artery and its branches are normal in caliber.   The thoracic aorta normal in course and caliber.Scattered atherosclerosis involving the thoracic aorta and branch vessels. 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 Moderate coronary artery calcifications are seen. Please note,the study is not optimized for evaluation of coronary arteries.   The cardiac chambers are not enlarged.   There is no pericardial effusion seen.   MEDIASTINUM AND SOCORRO, LOWER NECK AND AXILLA: The visualized thyroid gland is within normal limits. Similar pre-existing mediastinal and hilar lymph nodes with coarse calcifications. Esophagus appears within normal limits as seen.   LUNGS AND AIRWAYS: There is probable newly seen peripheral mucous plugging right lower lobe with adjacent newly seen subsegmental atelectasis.There is similar lung heterogeneity/mosaic attenuation. No evident consolidative pneumonia, edema or pleural effusion. There are scattered postinflammatory calcifications.   CHEST WALL AND  LOWER NECK: Unremarkable.   ABDOMEN:   LIVER: Normal morphology. Scattered sharply marginated homogeneous hypodensities most compatible with cysts again seen.   BILE DUCTS: Normal caliber.   GALLBLADDER: Nondistended containing a radiopaque gallstone.   PANCREAS: The pancreas appears unremarkable without evidence of ductal dilatation or masses.   SPLEEN: Similar size with similar multifocal benign postinflammatory calcifications.   ADRENAL GLANDS: Within normal limits.   KIDNEYS AND URETERS: Normal morphology without suspicious mass or hydronephrosis. Similar probably benign bilateral renal cysts.   PELVIS:   BLADDER: The urinary bladder is decompressed, limited for evaluation. No evident mass or calculus.   REPRODUCTIVE ORGANS: Similar appearance mildly indenting the base urinary bladder.   BOWEL: Nondistended. No wall thickening or surrounding inflammatory changes or pneumatosis.   VESSELS: Scattered atherosclerosis. Fusiform dilation infrarenal aorta measures 2.9 cm AP diameter compared with 2.8 cm.   PERITONEUM/RETROPERITONEUM/LYMPH NODES: No ascites or free air, no fluid collection. No abdominopelvic lymphadenopathy is present.   BONE AND SOFT TISSUE: No acute osseous abnormalities. There are scattered degenerative changes. No acute abdominal wall abnormalities.       1. No evidence of acute pulmonary embolism to  proximal segmental level. Please note that, assessment of distal segmental and subsegmental branches is limited and small peripheral emboli are not entirely excluded. 2. Probable new hazy mucous plugging/impaction peripheral bronchi right lower lobe with adjacent subsegmental atelectasis potentially related to aspiration. 3. Fusiform infrarenal abdominal aortic bulge measures 2.9 cm AP diameter compared with 2.8 cm. 4. No separate acute detected abnormalities.   MACRO: None   Signed by: Hesham Davis 6/26/2024 6:37 PM Dictation workstation:   [id number]    CT angio chest for pulmonary  embolism    Result Date: 6/26/2024  Interpreted By:  Hesham Davis, STUDY: CT ANGIO CHEST FOR PULMONARY EMBOLISM; CT ABDOMEN PELVIS W IV CONTRAST;  6/26/2024 5:34 pm   INDICATION: Signs/Symptoms:cough, abdominal pain; Signs/Symptoms:abdominal pain.   COMPARISON: Selected examinations as far back as July 15, 2021 CT chest abdomen and pelvis including August 29, 2022 chest CT and August 3, 2023 CT pulmonary angiogram   ACCESSION NUMBER(S): [id number]; [id number]   ORDERING CLINICIAN: LIZ NICHOLE   TECHNIQUE: Helical data acquisition of the chest was obtained after intravenous administration of 75 ML Omnipaque 350, as per PE protocol for the chest portion. Routine acquisitions of the abdomen and pelvis were obtained. Images were reformatted in coronal and sagittal planes. Axial and coronal maximum intensity projection (MIP) images of the chest were created and reviewed.   FINDINGS: POTENTIAL LIMITATIONS OF THE STUDY: Image degradation related to motion and beam hardening artifact from radiopaque structures.   HEART AND VESSELS: No discrete filling defects within the main pulmonary artery or its branches to  proximal segmental level. Please note that, assessment of distal segmental and subsegmental branches is limited and small peripheral emboli are not entirely excluded. Main pulmonary artery and its branches are normal in caliber.   The thoracic aorta normal in course and caliber.Scattered atherosclerosis involving the thoracic aorta and branch vessels. 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 Moderate coronary artery calcifications are seen. Please note,the study is not optimized for evaluation of coronary arteries.   The cardiac chambers are not enlarged.   There is no pericardial effusion seen.   MEDIASTINUM AND SOCORRO, LOWER NECK AND AXILLA: The visualized thyroid gland is within normal limits. Similar pre-existing mediastinal and hilar lymph nodes with coarse  calcifications. Esophagus appears within normal limits as seen.   LUNGS AND AIRWAYS: There is probable newly seen peripheral mucous plugging right lower lobe with adjacent newly seen subsegmental atelectasis.There is similar lung heterogeneity/mosaic attenuation. No evident consolidative pneumonia, edema or pleural effusion. There are scattered postinflammatory calcifications.   CHEST WALL AND LOWER NECK: Unremarkable.   ABDOMEN:   LIVER: Normal morphology. Scattered sharply marginated homogeneous hypodensities most compatible with cysts again seen.   BILE DUCTS: Normal caliber.   GALLBLADDER: Nondistended containing a radiopaque gallstone.   PANCREAS: The pancreas appears unremarkable without evidence of ductal dilatation or masses.   SPLEEN: Similar size with similar multifocal benign postinflammatory calcifications.   ADRENAL GLANDS: Within normal limits.   KIDNEYS AND URETERS: Normal morphology without suspicious mass or hydronephrosis. Similar probably benign bilateral renal cysts.   PELVIS:   BLADDER: The urinary bladder is decompressed, limited for evaluation. No evident mass or calculus.   REPRODUCTIVE ORGANS: Similar appearance mildly indenting the base urinary bladder.   BOWEL: Nondistended. No wall thickening or surrounding inflammatory changes or pneumatosis.   VESSELS: Scattered atherosclerosis. Fusiform dilation infrarenal aorta measures 2.9 cm AP diameter compared with 2.8 cm.   PERITONEUM/RETROPERITONEUM/LYMPH NODES: No ascites or free air, no fluid collection. No abdominopelvic lymphadenopathy is present.   BONE AND SOFT TISSUE: No acute osseous abnormalities. There are scattered degenerative changes. No acute abdominal wall abnormalities.       1. No evidence of acute pulmonary embolism to  proximal segmental level. Please note that, assessment of distal segmental and subsegmental branches is limited and small peripheral emboli are not entirely excluded. 2. Probable new hazy mucous  plugging/impaction peripheral bronchi right lower lobe with adjacent subsegmental atelectasis potentially related to aspiration. 3. Fusiform infrarenal abdominal aortic bulge measures 2.9 cm AP diameter compared with 2.8 cm. 4. No separate acute detected abnormalities.   MACRO: None   Signed by: Hesham Davis 6/26/2024 6:37 PM Dictation workstation:   [id number]    CT head wo IV contrast    Result Date: 6/26/2024  Interpreted By:  Hesham Davis, STUDY: CT HEAD WO IV CONTRAST;  6/26/2024 5:28 pm   INDICATION: Signs/Symptoms:loss of consciousness.   COMPARISON: None.   ACCESSION NUMBER(S): [id number]   ORDERING CLINICIAN: LIZ NICHOLE   TECHNIQUE: Noncontrast axial CT scan of head was performed. Angled reformats in brain and bone windows were generated. The images were reviewed in bone, brain, blood and soft tissue windows.   FINDINGS: Similar prominence of the ventricles, sulci, and cisterns compatible with atrophy. Similar large right MCA distribution area of encephalomalacia with ex vacuo dilation of the right lateral ventricle. Similar smaller area of encephalomalacia left frontal region. There are nonspecific low-density white matter changes. No detected acute intra or extra-axial fluid collection, mass, or mass effect.   Paranasal sinuses: Similar polypoid mucosal thickening or mucous retention cyst right sphenoid sinus.   Calvarium: Unremarkable.       No evident acute intracranial hemorrhage, mass, or mass effect for large territorial infarct.  If there is persistent concern for acute cerebral insult, MRI is recommended.     Similar chronic large area of encephalomalacia compatible with remote right MCA distribution infarct and smaller left MCA distribution infarct.   Chronic atrophy and nonspecific low-density white matter changes.   MACRO: None   Signed by: Hesham Davis 6/26/2024 6:19 PM Dictation workstation:   [id number]   Scheduled medications   Medication Dose Route Frequency    aspirin  81  mg oral Daily    atorvastatin  80 mg oral Nightly    azithromycin  500 mg intravenous q24h    clopidogrel  75 mg oral Daily    dexAMETHasone  6 mg oral Daily    enoxaparin  40 mg subcutaneous q24h    escitalopram  10 mg oral Daily    metoprolol succinate XL  25 mg oral Daily    pantoprazole  40 mg oral Daily    Or    pantoprazole  40 mg intravenous Daily    perflutren lipid microspheres  0.5-10 mL of dilution intravenous Once in imaging    perflutren protein A microsphere  0.5 mL intravenous Once in imaging    piperacillin-tazobactam  3.375 g intravenous q8h    And    sodium chloride  10 mL intravenous q8h    sulfur hexafluoride microsphr  2 mL intravenous Once in imaging     PRN medications   Medication    acetaminophen    Or    acetaminophen    Or    acetaminophen    benzocaine-menthol    melatonin    ondansetron    Or    ondansetron    polyethylene glycol                                                     Assessment/Plan      Principal Problem:    COVID-19  Active Problems:    Left hemiparesis (Multi)    Essential hypertension    Syncope and collapse    Aspiration pneumonia of right lower lobe (Multi)  Impression:  Syncope episode most likely from coughing spell  Worsening left-sided residual weakness, exacerbated by current aspiration PNA and Covid  History of right PCA infarct in 2020-on DAPT and statin at home  Recommendations:   Will update wife with MRI results  Continue with current stroke prevention regimen  Treat underlying PNA and other metabolic disturbances  Can follow up with his Neurologist, Dr. Pisano as oupt.      I have discussed the patient and the plan of care with the Neurologist on-call, Dr. Soni.              I spent 35 minutes in the professional and overall care of this patient.      Donna Lovelace, APRN-CNP

## 2024-06-28 NOTE — PROGRESS NOTES
formerly [facility] Heart Progress Note           Rounding JUVENCIO/Cardiologist:  Keegan Holland, APRN-LUDMILA, Dr. Dane Estrada  Primary Cardiologist: Dr. Dane Estrada    Date:  6/28/2024  Patient:  Lukas Shen  YOB: 1950  MRN:  [id number]   Admit Date:  6/26/2024      SUBJECTIVE:    6/28/24  Patient is awake and alert he states he is feeling much better I did discuss with them most likely the syncopal episode was from a coughing spell and that he has aspiration pneumonia.  He is going to follow-up with cardiology at that time and in 3 to 4 weeks when the pneumonia is resolved we will get a Lexiscan nuclear stress test  tele normal sinus rhythm no ectopy      VITALS:     Vitals:    06/27/24 1948 06/27/24 2043 06/28/24 0020 06/28/24 0730   BP: 85/51 121/86 99/62 119/55   BP Location:       Patient Position:       Pulse: 69 69 68 63   Resp: 18  17 17   Temp: 37.3 °C (99.1 °F)  36.3 °C (97.3 °F) 36.8 °C (98.2 °F)   TempSrc:    Temporal   SpO2: 91% 91% 92% 91%   Weight:       Height:           Intake/Output Summary (Last 24 hours) at 6/28/2024 1050  Last data filed at 6/28/2024 0957  Gross per 24 hour   Intake 1850 ml   Output 2000 ml   Net -150 ml       Wt Readings from Last 4 Encounters:   06/26/24 78.9 kg (174 lb)   03/14/24 78 kg (172 lb)   02/15/24 77.6 kg (171 lb)   11/14/23 72.6 kg (160 lb)       CURRENT HOSPITAL MEDICATIONS:   aspirin, 81 mg, oral, Daily  atorvastatin, 80 mg, oral, Nightly  azithromycin, 500 mg, intravenous, q24h  clopidogrel, 75 mg, oral, Daily  dexAMETHasone, 6 mg, oral, Daily  enoxaparin, 40 mg, subcutaneous, q24h  escitalopram, 10 mg, oral, Daily  metoprolol succinate XL, 25 mg, oral, Daily  pantoprazole, 40 mg, oral, Daily   Or  pantoprazole, 40 mg, intravenous, Daily  perflutren lipid microspheres, 0.5-10 mL of dilution, intravenous, Once in imaging  perflutren protein A microsphere, 0.5 mL, intravenous, Once in imaging  piperacillin-tazobactam, 3.375 g, intravenous, q8h    And  sodium chloride, 10 mL, intravenous, q8h  sulfur hexafluoride microsphr, 2 mL, intravenous, Once in imaging         Current Outpatient Medications   Medication Instructions    albuterol 90 mcg/actuation inhaler inhalation    aspirin 81 mg chewable tablet oral    atorvastatin (LIPITOR) 80 mg, oral, Daily    clopidogrel (PLAVIX) 75 mg, oral, Daily    escitalopram (LEXAPRO) 10 mg, oral, Daily    esomeprazole (NEXIUM) 40 mg, oral, Daily        PHYSICAL EXAMINATION:   GENERAL APPEARANCE: Awake and alert following commands  CHEST: Symmetric and non-tender.  INTEGUMENT: Skin warm and dry, without gross excoriationis or lesions.  HEENT: No gross abnormalities of conjunctiva, teeth, gums, oral mucosa  NECK: Supple, no JVD, no bruit. Thyroid not palpable. Carotid upstrokes normal.  NEURO/PSHCY: Awake and alert and oriented x 3.  Left-sided hemiparesis  LUNGS: Bilateral rhonchi  HEART: Rate and rhythm regular with no evident murmur; no gallop appreciated. There are no rubs, clicks or heaves. PMI nondisplaced.  ABDOMEN: Soft, nontender, no palpable hepatosplenomegaly, no mases, no bruits. Abdominal aorta not noted to be enlarged.  MUSCULOSKELETAL: History of left-sided hemiparesis from previous stroke  EXTREMITIES: Warm with good color, no clubbing or cyanois. There is no edema noted.  PERIPHERAL VASCULAR: Pulses present and equally palpable; 1+ throughout. No femoral bruits.      LAB DATA:     CBC:   Results from last 7 days   Lab Units 06/28/24  0557 06/27/24  0527 06/26/24  1539   WBC AUTO x10*3/uL 9.3 10.2 12.8*   RBC AUTO x10*6/uL 4.38* 4.45* 4.64   HEMOGLOBIN g/dL 13.8 14.0 14.5   HEMATOCRIT % 40.2* 42.0 42.4   MCV fL 92 94 91   MCH pg 31.5 31.5 31.3   MCHC g/dL 34.3 33.3 34.2   RDW % 12.3 12.5 12.7   PLATELETS AUTO x10*3/uL 220 206 230     CMP:    Results from last 7 days   Lab Units 06/28/24  0557 06/27/24  0527 06/26/24  1539   SODIUM mmol/L 138 137 136   POTASSIUM mmol/L 4.0 4.4 4.1   CHLORIDE mmol/L 106 104 103    CO2 mmol/L 24 24 26   BUN mg/dL 16 16 18   CREATININE mg/dL 1.23 1.36* 1.35*   GLUCOSE mg/dL 128* 101* 109*   PROTEIN TOTAL g/dL  --  6.6 7.1   CALCIUM mg/dL 8.6 8.9 9.1   BILIRUBIN TOTAL mg/dL  --  0.7 0.6   ALK PHOS U/L  --  95 113   AST U/L  --  15 13   ALT U/L  --  8* 11     BMP:    Results from last 7 days   Lab Units 06/28/24  0557 06/27/24  0527 06/26/24  1539   SODIUM mmol/L 138 137 136   POTASSIUM mmol/L 4.0 4.4 4.1   CHLORIDE mmol/L 106 104 103   CO2 mmol/L 24 24 26   BUN mg/dL 16 16 18   CREATININE mg/dL 1.23 1.36* 1.35*   CALCIUM mg/dL 8.6 8.9 9.1   GLUCOSE mg/dL 128* 101* 109*     Magnesium:  Results from last 7 days   Lab Units 06/28/24  0557 06/27/24  0527 06/26/24  1539   MAGNESIUM mg/dL 2.19 1.93 1.82     Troponin:    Results from last 7 days   Lab Units 06/26/24  1751 06/26/24  1539   TROPHS ng/L 5 7     BNP:   Results from last 7 days   Lab Units 06/26/24  1539   BNP pg/mL 26     Lipid Panel:         DIAGNOSTIC TESTING:   @No results found for this or any previous visit.    MR brain wo IV contrast    Result Date: 6/27/2024  Interpreted By:  Natanael Akbar, STUDY: MR BRAIN WO IV CONTRAST;  6/27/2024 8:43 pm   INDICATION: Signs/Symptoms:left sided weakness.   COMPARISON: 06/26/2024 CT head without contrast   ACCESSION NUMBER(S): [id number]   ORDERING CLINICIAN: SHWETA BENSON   TECHNIQUE: Multiplanar, multi-sequence images of the brain were obtained without contrast.   FINDINGS: Diffusion weighted images show no evidence of acute ischemic infarct.   Loss of flow void within the right intracranial petrous and cavernous ICA.   Large area of encephalomalacia in the right occipital lobe, right temporal lobe, right basal ganglia and insula. Moderate periventricular and subcortical hemispheric T2/FLAIR white matter hyperintensities are most compatible with chronic small vessel ischemic disease. Chronic lacunar infarct right cerebellar hemisphere.   There is no acute intraparenchymal hemorrhage, mass,  mass-effect, or an extra-axial fluid collection. Subcentimeter focus of blooming artifact in the right frontal cortical/subcortical parenchyma likely represents remote microhemorrhage.   Expected dilatation of the right lateral ventricle due to volume loss.   Normal morphology of midline structures. The craniovertebral junction is normal.   The orbits and globes are unremarkable.   The paranasal sinuses show no air-fluid levels or hemorrhage.   The mastoid air cells are clear.       No acute ischemic infarct, acute hemorrhage, or intracranial mass effect.   Chronic large territory infarct involving the right temporal lobe, occipital lobe, right insula and basal ganglia in addition to chronic small vessel ischemic changes as described above.   MACRO: None.   Signed by: Natanael Akbar 6/27/2024 9:23 PM Dictation workstation:   [id number]    Transthoracic Echo (TTE) Complete    Result Date: 6/27/2024          [place] [id number]  Tel [phone] Fax [phone] TRANSTHORACIC ECHOCARDIOGRAM REPORT Patient Name:      HARRY LACY        Reading Physician:    [id number] Vic Rossi MD, [facility] Study Date:        6/27/2024             Ordering Provider:    [id number] TRACE QUINTANA MRN/PID:           [id number]              Fellow: Accession#:        [id number]          Nurse: Date of Birth/Age: 1950 / 74 years  Sonographer:          Jeny Cooper                                                                KUMAR Gender:            M                     Additional Staff: Height:            180.34 cm             Admit Date:           6/26/2024 Weight:            78.93 kg              Admission Status:     Inpatient -                                                                Routine BSA / BMI:         1.99 m2 / 24.27 kg/m2 Department Location:  10  [facility] Blood Pressure: 148 /66 mmHg Study Type:    TRANSTHORACIC ECHO (TTE) COMPLETE Diagnosis/ICD: Syncope and collapse-R55; [id number] Indication:    Syncope CPT Codes:     Echo Complete w Full [id number] Patient History: Smoker:            Former. Pertinent History: HTN, Hyperlipidemia, Syncope and CVA. Study Detail: The following Echo studies were performed: 2D, M-Mode, Doppler and               color flow. Agitated saline used as a contrast agent for               intraseptal flow evaluation. The patient was awake.  PHYSICIAN INTERPRETATION: Left Ventricle: The left ventricular systolic function is normal, with a visually estimated ejection fraction of 55-60%. There are no regional wall motion abnormalities. The left ventricular cavity size is normal. The left ventricular septal wall thickness is normal. There is normal left ventricular posterior wall thickness. Spectral Doppler shows an impaired relaxation pattern of left ventricular diastolic filling. Left Atrium: The left atrium is normal in size. A bubble study using agitated saline was performed. Bubble study is negative. Left atrial volume index 12.5 mL/m2. Saline bubble contrast study is negative. Right Ventricle: The right ventricle is normal in size. There is normal right ventricular global systolic function. Normal right ventricular chamber size and function. Right Atrium: The right atrium is normal in size. Aortic Valve: The aortic valve is trileaflet. There is mild to moderate aortic valve cusp calcification. The aortic valve dimensionless index is 0.90. There is no evidence of aortic valve regurgitation. The peak instantaneous gradient of the aortic valve is 8.9 mmHg. The mean gradient of the aortic valve is 6.0 mmHg. Mitral Valve: The mitral valve is normal in structure. There is trace mitral valve regurgitation. Tricuspid Valve: The tricuspid valve is structurally normal. There is trace tricuspid regurgitation. The right ventricular  systolic pressure is unable to be estimated. Trivial tricuspid regurgitation. Pulmonic Valve: The pulmonic valve is structurally normal. There is no indication of pulmonic valve regurgitation. Pericardium: There is no pericardial effusion noted. Aorta: The aortic root is normal. Systemic Veins: The inferior vena cava appears to be of normal size. There is IVC inspiratory collapse greater than 50%.  CONCLUSIONS:  1. The left ventricular systolic function is normal, with a visually estimated ejection fraction of 55-60%.  2. Spectral Doppler shows an impaired relaxation pattern of left ventricular diastolic filling.  3. Normal right ventricular chamber size and function.  4. There is normal right ventricular global systolic function.  5. Left atrial volume index 12.5 mL/m2.     Saline bubble contrast study is negative.  6. Trivial tricuspid regurgitation.  7. Comparison study dated August 4, 2023 showed normal LV systolic function and trivial mitral regurgitation. QUANTITATIVE DATA SUMMARY: 2D MEASUREMENTS:                          Normal Ranges: Ao Root d:     3.34 cm   (2.0-3.7cm) LAs:           3.45 cm   (2.7-4.0cm) IVSd:          0.88 cm   (0.6-1.1cm) LVPWd:         0.96 cm   (0.6-1.1cm) LVIDd:         4.10 cm   (3.9-5.9cm) LVIDs:         2.83 cm LV Mass Index: 59.4 g/m2 LV % FS        31.0 % LA VOLUME:                               Normal Ranges: LA Vol A4C:        20.7 ml    (22+/-6mL/m2) LA Vol A2C:        24.8 ml LA Vol BP:         23.6 ml LA Vol Index A4C:  10.4ml/m2 LA Vol Index A2C:  12.5 ml/m2 LA Vol Index BP:   11.9 ml/m2 LA Area A4C:       10.7 cm2 LA Area A2C:       12.2 cm2 LA Major Axis A4C: 4.7 cm LA Major Axis A2C: 5.1 cm LA Volume Index:   11.4 ml/m2 RA VOLUME BY A/L METHOD:                               Normal Ranges: RA Vol A4C:        30.3 ml    (8.3-19.5ml) RA Vol Index A4C:  15.2 ml/m2 RA Area A4C:       13.0 cm2 RA Major Axis A4C: 4.7 cm AORTA MEASUREMENTS:                    Normal Ranges:  Asc Ao, d: 3.34 cm (2.1-3.4cm) LV SYSTOLIC FUNCTION BY 2D PLANIMETRY (MOD):                      Normal Ranges: EF-A4C View:    61 % (>=55%) EF-A2C View:    62 % EF-Biplane:     60 % EF-Visual:      58 % LV EF Reported: 58 % LV DIASTOLIC FUNCTION:                         Normal Ranges: MV Peak E:    0.84 m/s  (0.7-1.2 m/s) MV Peak A:    0.74 m/s  (0.42-0.7 m/s) E/A Ratio:    1.13      (1.0-2.2) MV e'         0.083 m/s (>8.0) MV lateral e' 0.08 m/s MV medial e'  0.08 m/s E/e' Ratio:   10.17     (<8.0) MITRAL VALVE:                 Normal Ranges: MV DT: 290 msec (150-240msec) AORTIC VALVE:                                   Normal Ranges: AoV Vmax:                1.49 m/s (<=1.7m/s) AoV Peak P.9 mmHg (<20mmHg) AoV Mean P.0 mmHg (1.7-11.5mmHg) LVOT Max Robert:            1.46 m/s (<=1.1m/s) AoV VTI:                 29.60 cm (18-25cm) LVOT VTI:                26.60 cm LVOT Diameter:           2.24 cm  (1.8-2.4cm) AoV Area, VTI:           3.54 cm2 (2.5-5.5cm2) AoV Area,Vmax:           3.86 cm2 (2.5-4.5cm2) AoV Dimensionless Index: 0.90  RIGHT VENTRICLE: RV Basal 3.02 cm RV Mid   2.47 cm RV Major 6.5 cm TAPSE:   23.9 mm RV s'    0.14 m/s TRICUSPID VALVE/RVSP:                   Normal Ranges: IVC Diam: 1.28 cm PULMONIC VALVE:                         Normal Ranges: PV Accel Time: 79 msec  (>120ms) PV Max Robert:    0.8 m/s  (0.6-0.9m/s) PV Max P.6 mmHg  [id number] Vic Rossi MD, FACC Electronically signed on 2024 at 4:24:14 PM  ** Final **     ECG 12 Lead    Result Date: 2024  Normal sinus rhythm Normal ECG When compared with ECG of 03-AUG-2023 18:48, Questionable change in QRS axis Confirmed by Kaia Olson ([id number]) on 2024 3:52:39 PM    CT abdomen pelvis w IV contrast    Result Date: 2024  Interpreted By:  Hesham Davis, STUDY: CT ANGIO CHEST FOR PULMONARY EMBOLISM; CT ABDOMEN PELVIS W IV CONTRAST;  2024 5:34 pm   INDICATION: Signs/Symptoms:cough, abdominal pain;  Signs/Symptoms:abdominal pain.   COMPARISON: Selected examinations as far back as July 15, 2021 CT chest abdomen and pelvis including August 29, 2022 chest CT and August 3, 2023 CT pulmonary angiogram   ACCESSION NUMBER(S): [id number]; [id number]   ORDERING CLINICIAN: LIZ NICHOLE   TECHNIQUE: Helical data acquisition of the chest was obtained after intravenous administration of 75 ML Omnipaque 350, as per PE protocol for the chest portion. Routine acquisitions of the abdomen and pelvis were obtained. Images were reformatted in coronal and sagittal planes. Axial and coronal maximum intensity projection (MIP) images of the chest were created and reviewed.   FINDINGS: POTENTIAL LIMITATIONS OF THE STUDY: Image degradation related to motion and beam hardening artifact from radiopaque structures.   HEART AND VESSELS: No discrete filling defects within the main pulmonary artery or its branches to  proximal segmental level. Please note that, assessment of distal segmental and subsegmental branches is limited and small peripheral emboli are not entirely excluded. Main pulmonary artery and its branches are normal in caliber.   The thoracic aorta normal in course and caliber.Scattered atherosclerosis involving the thoracic aorta and branch vessels. 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 Moderate coronary artery calcifications are seen. Please note,the study is not optimized for evaluation of coronary arteries.   The cardiac chambers are not enlarged.   There is no pericardial effusion seen.   MEDIASTINUM AND SOCORRO, LOWER NECK AND AXILLA: The visualized thyroid gland is within normal limits. Similar pre-existing mediastinal and hilar lymph nodes with coarse calcifications. Esophagus appears within normal limits as seen.   LUNGS AND AIRWAYS: There is probable newly seen peripheral mucous plugging right lower lobe with adjacent newly seen subsegmental atelectasis.There is similar lung  heterogeneity/mosaic attenuation. No evident consolidative pneumonia, edema or pleural effusion. There are scattered postinflammatory calcifications.   CHEST WALL AND LOWER NECK: Unremarkable.   ABDOMEN:   LIVER: Normal morphology. Scattered sharply marginated homogeneous hypodensities most compatible with cysts again seen.   BILE DUCTS: Normal caliber.   GALLBLADDER: Nondistended containing a radiopaque gallstone.   PANCREAS: The pancreas appears unremarkable without evidence of ductal dilatation or masses.   SPLEEN: Similar size with similar multifocal benign postinflammatory calcifications.   ADRENAL GLANDS: Within normal limits.   KIDNEYS AND URETERS: Normal morphology without suspicious mass or hydronephrosis. Similar probably benign bilateral renal cysts.   PELVIS:   BLADDER: The urinary bladder is decompressed, limited for evaluation. No evident mass or calculus.   REPRODUCTIVE ORGANS: Similar appearance mildly indenting the base urinary bladder.   BOWEL: Nondistended. No wall thickening or surrounding inflammatory changes or pneumatosis.   VESSELS: Scattered atherosclerosis. Fusiform dilation infrarenal aorta measures 2.9 cm AP diameter compared with 2.8 cm.   PERITONEUM/RETROPERITONEUM/LYMPH NODES: No ascites or free air, no fluid collection. No abdominopelvic lymphadenopathy is present.   BONE AND SOFT TISSUE: No acute osseous abnormalities. There are scattered degenerative changes. No acute abdominal wall abnormalities.       1. No evidence of acute pulmonary embolism to  proximal segmental level. Please note that, assessment of distal segmental and subsegmental branches is limited and small peripheral emboli are not entirely excluded. 2. Probable new hazy mucous plugging/impaction peripheral bronchi right lower lobe with adjacent subsegmental atelectasis potentially related to aspiration. 3. Fusiform infrarenal abdominal aortic bulge measures 2.9 cm AP diameter compared with 2.8 cm. 4. No separate acute  detected abnormalities.   MACRO: None   Signed by: Hesham Davis 6/26/2024 6:37 PM Dictation workstation:   [id number]    CT angio chest for pulmonary embolism    Result Date: 6/26/2024  Interpreted By:  Hesham Davis, STUDY: CT ANGIO CHEST FOR PULMONARY EMBOLISM; CT ABDOMEN PELVIS W IV CONTRAST;  6/26/2024 5:34 pm   INDICATION: Signs/Symptoms:cough, abdominal pain; Signs/Symptoms:abdominal pain.   COMPARISON: Selected examinations as far back as July 15, 2021 CT chest abdomen and pelvis including August 29, 2022 chest CT and August 3, 2023 CT pulmonary angiogram   ACCESSION NUMBER(S): [id number]; [id number]   ORDERING CLINICIAN: LIZ NICHOLE   TECHNIQUE: Helical data acquisition of the chest was obtained after intravenous administration of 75 ML Omnipaque 350, as per PE protocol for the chest portion. Routine acquisitions of the abdomen and pelvis were obtained. Images were reformatted in coronal and sagittal planes. Axial and coronal maximum intensity projection (MIP) images of the chest were created and reviewed.   FINDINGS: POTENTIAL LIMITATIONS OF THE STUDY: Image degradation related to motion and beam hardening artifact from radiopaque structures.   HEART AND VESSELS: No discrete filling defects within the main pulmonary artery or its branches to  proximal segmental level. Please note that, assessment of distal segmental and subsegmental branches is limited and small peripheral emboli are not entirely excluded. Main pulmonary artery and its branches are normal in caliber.   The thoracic aorta normal in course and caliber.Scattered atherosclerosis involving the thoracic aorta and branch vessels. 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 Moderate coronary artery calcifications are seen. Please note,the study is not optimized for evaluation of coronary arteries.   The cardiac chambers are not enlarged.   There is no pericardial effusion seen.   MEDIASTINUM AND SOCORRO,  LOWER NECK AND AXILLA: The visualized thyroid gland is within normal limits. Similar pre-existing mediastinal and hilar lymph nodes with coarse calcifications. Esophagus appears within normal limits as seen.   LUNGS AND AIRWAYS: There is probable newly seen peripheral mucous plugging right lower lobe with adjacent newly seen subsegmental atelectasis.There is similar lung heterogeneity/mosaic attenuation. No evident consolidative pneumonia, edema or pleural effusion. There are scattered postinflammatory calcifications.   CHEST WALL AND LOWER NECK: Unremarkable.   ABDOMEN:   LIVER: Normal morphology. Scattered sharply marginated homogeneous hypodensities most compatible with cysts again seen.   BILE DUCTS: Normal caliber.   GALLBLADDER: Nondistended containing a radiopaque gallstone.   PANCREAS: The pancreas appears unremarkable without evidence of ductal dilatation or masses.   SPLEEN: Similar size with similar multifocal benign postinflammatory calcifications.   ADRENAL GLANDS: Within normal limits.   KIDNEYS AND URETERS: Normal morphology without suspicious mass or hydronephrosis. Similar probably benign bilateral renal cysts.   PELVIS:   BLADDER: The urinary bladder is decompressed, limited for evaluation. No evident mass or calculus.   REPRODUCTIVE ORGANS: Similar appearance mildly indenting the base urinary bladder.   BOWEL: Nondistended. No wall thickening or surrounding inflammatory changes or pneumatosis.   VESSELS: Scattered atherosclerosis. Fusiform dilation infrarenal aorta measures 2.9 cm AP diameter compared with 2.8 cm.   PERITONEUM/RETROPERITONEUM/LYMPH NODES: No ascites or free air, no fluid collection. No abdominopelvic lymphadenopathy is present.   BONE AND SOFT TISSUE: No acute osseous abnormalities. There are scattered degenerative changes. No acute abdominal wall abnormalities.       1. No evidence of acute pulmonary embolism to  proximal segmental level. Please note that, assessment of distal  segmental and subsegmental branches is limited and small peripheral emboli are not entirely excluded. 2. Probable new hazy mucous plugging/impaction peripheral bronchi right lower lobe with adjacent subsegmental atelectasis potentially related to aspiration. 3. Fusiform infrarenal abdominal aortic bulge measures 2.9 cm AP diameter compared with 2.8 cm. 4. No separate acute detected abnormalities.   MACRO: None   Signed by: Hesham Davis 6/26/2024 6:37 PM Dictation workstation:   [id number]    CT head wo IV contrast    Result Date: 6/26/2024  Interpreted By:  Hesham Davis, STUDY: CT HEAD WO IV CONTRAST;  6/26/2024 5:28 pm   INDICATION: Signs/Symptoms:loss of consciousness.   COMPARISON: None.   ACCESSION NUMBER(S): [id number]   ORDERING CLINICIAN: LIZ NICHOLE   TECHNIQUE: Noncontrast axial CT scan of head was performed. Angled reformats in brain and bone windows were generated. The images were reviewed in bone, brain, blood and soft tissue windows.   FINDINGS: Similar prominence of the ventricles, sulci, and cisterns compatible with atrophy. Similar large right MCA distribution area of encephalomalacia with ex vacuo dilation of the right lateral ventricle. Similar smaller area of encephalomalacia left frontal region. There are nonspecific low-density white matter changes. No detected acute intra or extra-axial fluid collection, mass, or mass effect.   Paranasal sinuses: Similar polypoid mucosal thickening or mucous retention cyst right sphenoid sinus.   Calvarium: Unremarkable.       No evident acute intracranial hemorrhage, mass, or mass effect for large territorial infarct.  If there is persistent concern for acute cerebral insult, MRI is recommended.     Similar chronic large area of encephalomalacia compatible with remote right MCA distribution infarct and smaller left MCA distribution infarct.   Chronic atrophy and nonspecific low-density white matter changes.   MACRO: None   Signed by: Hesham  Ryan 6/26/2024 6:19 PM Dictation workstation:   [id number]       MR brain wo IV contrast   Final Result   No acute ischemic infarct, acute hemorrhage, or intracranial mass   effect.        Chronic large territory infarct involving the right temporal lobe,   occipital lobe, right insula and basal ganglia in addition to chronic   small vessel ischemic changes as described above.        MACRO:   None.        Signed by: Natanael Akbar 6/27/2024 9:23 PM   Dictation workstation:   [id number]      Transthoracic Echo (TTE) Complete   Final Result      CT abdomen pelvis w IV contrast   Final Result   1. No evidence of acute pulmonary embolism to  proximal segmental   level. Please note that, assessment of distal segmental and   subsegmental branches is limited and small peripheral emboli are not   entirely excluded.   2. Probable new hazy mucous plugging/impaction peripheral bronchi   right lower lobe with adjacent subsegmental atelectasis potentially   related to aspiration.   3. Fusiform infrarenal abdominal aortic bulge measures 2.9 cm AP   diameter compared with 2.8 cm.   4. No separate acute detected abnormalities.        MACRO:   None        Signed by: Hesham Davis 6/26/2024 6:37 PM   Dictation workstation:   [id number]      CT angio chest for pulmonary embolism   Final Result   1. No evidence of acute pulmonary embolism to  proximal segmental   level. Please note that, assessment of distal segmental and   subsegmental branches is limited and small peripheral emboli are not   entirely excluded.   2. Probable new hazy mucous plugging/impaction peripheral bronchi   right lower lobe with adjacent subsegmental atelectasis potentially   related to aspiration.   3. Fusiform infrarenal abdominal aortic bulge measures 2.9 cm AP   diameter compared with 2.8 cm.   4. No separate acute detected abnormalities.        MACRO:   None        Signed by: Hesham Davis 6/26/2024 6:37 PM   Dictation workstation:    [id number]      CT head wo IV contrast   Final Result   No evident acute intracranial hemorrhage, mass, or mass effect for   large territorial infarct.  If there is persistent concern for acute   cerebral insult, MRI is recommended.             Similar chronic large area of encephalomalacia compatible with remote   right MCA distribution infarct and smaller left MCA distribution   infarct.        Chronic atrophy and nonspecific low-density white matter changes.        MACRO:   None        Signed by: Hesham Davis 6/26/2024 6:19 PM   Dictation workstation:   [id number]          Transthoracic Echo (TTE) Complete    Result Date: 6/27/2024          [place] [address]  Tel [phone] Fax [phone] TRANSTHORACIC ECHOCARDIOGRAM REPORT Patient Name:      HARRY LACY        Reading Physician:    [id number] Vic Rossi MD, [facility] Study Date:        6/27/2024             Ordering Provider:    [id number] TRACE QUINTANA MRN/PID:           [id number]              Fellow: Accession#:        [id number]          Nurse: Date of Birth/Age: 1950 / 74 years  Sonographer:          Jeny Cooper                                                                [facility] Gender:            M                     Additional Staff: Height:            180.34 cm             Admit Date:           6/26/2024 Weight:            78.93 kg              Admission Status:     Inpatient -                                                                Routine BSA / BMI:         1.99 m2 / 24.27 kg/m2 Department Location:  [address] [place] [address] Blood Pressure: 148 /66 mmHg Study Type:    TRANSTHORACIC ECHO (TTE) COMPLETE Diagnosis/ICD: Syncope and collapse-R55; [id number] Indication:    Syncope CPT Codes:     Echo Complete w Full [id number] Patient History: Smoker:            Former. Pertinent  History: HTN, Hyperlipidemia, Syncope and CVA. Study Detail: The following Echo studies were performed: 2D, M-Mode, Doppler and               color flow. Agitated saline used as a contrast agent for               intraseptal flow evaluation. The patient was awake.  PHYSICIAN INTERPRETATION: Left Ventricle: The left ventricular systolic function is normal, with a visually estimated ejection fraction of 55-60%. There are no regional wall motion abnormalities. The left ventricular cavity size is normal. The left ventricular septal wall thickness is normal. There is normal left ventricular posterior wall thickness. Spectral Doppler shows an impaired relaxation pattern of left ventricular diastolic filling. Left Atrium: The left atrium is normal in size. A bubble study using agitated saline was performed. Bubble study is negative. Left atrial volume index 12.5 mL/m2. Saline bubble contrast study is negative. Right Ventricle: The right ventricle is normal in size. There is normal right ventricular global systolic function. Normal right ventricular chamber size and function. Right Atrium: The right atrium is normal in size. Aortic Valve: The aortic valve is trileaflet. There is mild to moderate aortic valve cusp calcification. The aortic valve dimensionless index is 0.90. There is no evidence of aortic valve regurgitation. The peak instantaneous gradient of the aortic valve is 8.9 mmHg. The mean gradient of the aortic valve is 6.0 mmHg. Mitral Valve: The mitral valve is normal in structure. There is trace mitral valve regurgitation. Tricuspid Valve: The tricuspid valve is structurally normal. There is trace tricuspid regurgitation. The right ventricular systolic pressure is unable to be estimated. Trivial tricuspid regurgitation. Pulmonic Valve: The pulmonic valve is structurally normal. There is no indication of pulmonic valve regurgitation. Pericardium: There is no pericardial effusion noted. Aorta: The aortic root is  normal. Systemic Veins: The inferior vena cava appears to be of normal size. There is IVC inspiratory collapse greater than 50%.  CONCLUSIONS:  1. The left ventricular systolic function is normal, with a visually estimated ejection fraction of 55-60%.  2. Spectral Doppler shows an impaired relaxation pattern of left ventricular diastolic filling.  3. Normal right ventricular chamber size and function.  4. There is normal right ventricular global systolic function.  5. Left atrial volume index 12.5 mL/m2.     Saline bubble contrast study is negative.  6. Trivial tricuspid regurgitation.  7. Comparison study dated August 4, 2023 showed normal LV systolic function and trivial mitral regurgitation. QUANTITATIVE DATA SUMMARY: 2D MEASUREMENTS:                          Normal Ranges: Ao Root d:     3.34 cm   (2.0-3.7cm) LAs:           3.45 cm   (2.7-4.0cm) IVSd:          0.88 cm   (0.6-1.1cm) LVPWd:         0.96 cm   (0.6-1.1cm) LVIDd:         4.10 cm   (3.9-5.9cm) LVIDs:         2.83 cm LV Mass Index: 59.4 g/m2 LV % FS        31.0 % LA VOLUME:                               Normal Ranges: LA Vol A4C:        20.7 ml    (22+/-6mL/m2) LA Vol A2C:        24.8 ml LA Vol BP:         23.6 ml LA Vol Index A4C:  10.4ml/m2 LA Vol Index A2C:  12.5 ml/m2 LA Vol Index BP:   11.9 ml/m2 LA Area A4C:       10.7 cm2 LA Area A2C:       12.2 cm2 LA Major Axis A4C: 4.7 cm LA Major Axis A2C: 5.1 cm LA Volume Index:   11.4 ml/m2 RA VOLUME BY A/L METHOD:                               Normal Ranges: RA Vol A4C:        30.3 ml    (8.3-19.5ml) RA Vol Index A4C:  15.2 ml/m2 RA Area A4C:       13.0 cm2 RA Major Axis A4C: 4.7 cm AORTA MEASUREMENTS:                    Normal Ranges: Asc Ao, d: 3.34 cm (2.1-3.4cm) LV SYSTOLIC FUNCTION BY 2D PLANIMETRY (MOD):                      Normal Ranges: EF-A4C View:    61 % (>=55%) EF-A2C View:    62 % EF-Biplane:     60 % EF-Visual:      58 % LV EF Reported: 58 % LV DIASTOLIC FUNCTION:                          Normal Ranges: MV Peak E:    0.84 m/s  (0.7-1.2 m/s) MV Peak A:    0.74 m/s  (0.42-0.7 m/s) E/A Ratio:    1.13      (1.0-2.2) MV e'         0.083 m/s (>8.0) MV lateral e' 0.08 m/s MV medial e'  0.08 m/s E/e' Ratio:   10.17     (<8.0) MITRAL VALVE:                 Normal Ranges: MV DT: 290 msec (150-240msec) AORTIC VALVE:                                   Normal Ranges: AoV Vmax:                1.49 m/s (<=1.7m/s) AoV Peak P.9 mmHg (<20mmHg) AoV Mean P.0 mmHg (1.7-11.5mmHg) LVOT Max Robert:            1.46 m/s (<=1.1m/s) AoV VTI:                 29.60 cm (18-25cm) LVOT VTI:                26.60 cm LVOT Diameter:           2.24 cm  (1.8-2.4cm) AoV Area, VTI:           3.54 cm2 (2.5-5.5cm2) AoV Area,Vmax:           3.86 cm2 (2.5-4.5cm2) AoV Dimensionless Index: 0.90  RIGHT VENTRICLE: RV Basal 3.02 cm RV Mid   2.47 cm RV Major 6.5 cm TAPSE:   23.9 mm RV s'    0.14 m/s TRICUSPID VALVE/RVSP:                   Normal Ranges: IVC Diam: 1.28 cm PULMONIC VALVE:                         Normal Ranges: PV Accel Time: 79 msec  (>120ms) PV Max Robert:    0.8 m/s  (0.6-0.9m/s) PV Max P.6 mmHg  [id number] Vic Rossi MD, FACC Electronically signed on 2024 at 4:24:14 PM  ** Final **      RADIOLOGY:     MR brain wo IV contrast   Final Result   No acute ischemic infarct, acute hemorrhage, or intracranial mass   effect.        Chronic large territory infarct involving the right temporal lobe,   occipital lobe, right insula and basal ganglia in addition to chronic   small vessel ischemic changes as described above.        MACRO:   None.        Signed by: Natanael Akbar 2024 9:23 PM   Dictation workstation:   [id number]      Transthoracic Echo (TTE) Complete   Final Result      CT abdomen pelvis w IV contrast   Final Result   1. No evidence of acute pulmonary embolism to  proximal segmental   level. Please note that, assessment of distal segmental and   subsegmental branches is limited and  small peripheral emboli are not   entirely excluded.   2. Probable new hazy mucous plugging/impaction peripheral bronchi   right lower lobe with adjacent subsegmental atelectasis potentially   related to aspiration.   3. Fusiform infrarenal abdominal aortic bulge measures 2.9 cm AP   diameter compared with 2.8 cm.   4. No separate acute detected abnormalities.        MACRO:   None        Signed by: Hesham Davis 6/26/2024 6:37 PM   Dictation workstation:   [id number]      CT angio chest for pulmonary embolism   Final Result   1. No evidence of acute pulmonary embolism to  proximal segmental   level. Please note that, assessment of distal segmental and   subsegmental branches is limited and small peripheral emboli are not   entirely excluded.   2. Probable new hazy mucous plugging/impaction peripheral bronchi   right lower lobe with adjacent subsegmental atelectasis potentially   related to aspiration.   3. Fusiform infrarenal abdominal aortic bulge measures 2.9 cm AP   diameter compared with 2.8 cm.   4. No separate acute detected abnormalities.        MACRO:   None        Signed by: Hesham Davis 6/26/2024 6:37 PM   Dictation workstation:   [id number]      CT head wo IV contrast   Final Result   No evident acute intracranial hemorrhage, mass, or mass effect for   large territorial infarct.  If there is persistent concern for acute   cerebral insult, MRI is recommended.             Similar chronic large area of encephalomalacia compatible with remote   right MCA distribution infarct and smaller left MCA distribution   infarct.        Chronic atrophy and nonspecific low-density white matter changes.        MACRO:   None        Signed by: Hesham Davis 6/26/2024 6:19 PM   Dictation workstation:   [id number]          PROBLEM LIST     Patient Active Problem List   Diagnosis    Left hemiparesis (Multi)    Gastroesophageal reflux disease    Essential hypertension    Pure hypercholesterolemia    Tobacco abuse,  in remission    Carotid stenosis, right    COVID-19    Syncope and collapse    Aspiration pneumonia of right lower lobe (Multi)       ASSESSMENT:   Syncope  COVID-19 positive  Aspiration pneumonia  History of CVA  Carotid stenosis  Hypertension  Dyslipidemia  EF normal      PLAN:   Patient seen and examined in conjunction with DAVID Mckeon and agree with the evaluation as noted above.  74-year-old gentleman with history of prior CVA with left hemiparesis, hypertension, dyslipidemia carotid stenosis who presents with 2-day history of progressive cough and congestion in the chest as well as fatigue walks with found to be positive for COVID-19.  He apparently coughed so much that he passed out and cardiology was consulted for further evaluation for the syncope.  He denied any other symptoms such as chest pain or significant shortness of breath.  He denied any recent fever or chills.  His telemetry shows sinus rhythm with occasional PACs.     Agree with examination as noted above.  Cardiac exam reveals regular.  Second heart sound, no murmurs are heard.     ASSESSMENT AND PLAN:  1.  Presyncope/syncope: This is most likely vasovagal likely related to persistent and very strong coughing bouts.  Cardiac telemetry so far is unrevealing of any significant arrhythmias.  He has an echocardiogram pending to rule out any significant cardiac structural abnormalities.  Agree with current supportive treatment for his COVID-19 infection, hydration, and will make further recommendations based on results of the echocardiogram.  2.  COVID-19 infection, will defer to primary team for continued management.  3.  S/p CVA with left-sided residuals, continue to optimize blood pressure control.  AKA    6/28/24  No further cardiac testing needed at this present time  EF normal  Will follow-up with cardiology as an outpatient  He will follow-up with cardiology and then we will plan for an outpatient Lexiscan nuclear stress test at that  time    Juan Holland CNP  [facility]      Of note, this documentation is completed using the Dragon Dictation system (voice recognition software). There may be spelling and/or grammatical errors that were not corrected prior to final submission.    Please do not hesitate to call with questions.  Electronically signed by DAVID Hale-CNP, on 6/28/2024 at 10:50 AM     Patient seen and examined in conjunction with DAVID Mckeon and agree with the evaluation as noted above.  Patient is doing well with improved shortness of breath.  He has not had any recurrent episodes of syncope.  His repeat echocardiogram shows normal LV function and no significant changes from his prior echocardiogram.  We will continue with current supportive treatment and patient can be discharged from cardiology perspective for outpatient follow-up.  AKA

## 2024-06-28 NOTE — DISCHARGE SUMMARY
Medical Group Discharge Summary  DISCHARGE DIAGNOSIS     COVID-9 Pneumonia  Syncope and collapse  Recrudescence of old stroke symptoms  Aspiration pneumonia ruled out after investigation  Hx CVA, HTN    HOSPITAL COURSE AND DETAILS     Lukas Shen is a 74 y.o. male with PMH including CVA with residual left sided deficits who presented to hospital following syncopal follow-up.  They are also concerned regarding worsening left-sided weakness following event.  On arrival to the ED patient was noted to have mild leukocytosis, 12.8, and found to be COVID-positive.  Metabolic panel negative for gross derangements.  CT head was obtained and negative for acute intracranial process however did demonstrate persistent large area of encephalomalacia compatible with remote right MCA infarct and small left MCA infarct.  CT PE and CT A/P were obtained.  No PE was identified however the AC mucous plugging/impaction was noted in the right lower lobe peripheral bronchi raising concerns for potential aspiration/pneumonia.  Patient was given IV fluids, started on Zosyn/azithromycin, and admitted to the hospitalist service for continued management and monitoring.  Patient initially required oxygen to maintain saturations.    While on medical floor patient was treated supportively.  He was initiated on Decadron for treatment of COVID-pneumonia.  Cardiology and neurology were consulted for evaluation in light of concerns of syncopal events as well as potential recurrent infarct.  Cards evaluated patient and advised continued telemetry monitoring and obtaining TTE which revealed preserved EF without significant valvular disease or wall motion abnormalities.  No significant dysrhythmia was detected on telemetry.  Patient was scheduled for outpatient follow-up for consideration of Lexiscan nuclear stress test.  Neurology evaluated patient and advised repeat MRI to rule out acute CVA.  MRI was obtained and negative for acute  intracranial process only redemonstrating old infarcts.  Neurology suspected recrudescence of old stroke symptoms in light of active infection.  Syncopal event thought to be related to COVID infection versus coughing spell at home.  Patient's respiratory status improved with continued steroids and supportive care.  Decadron to be discontinued at time of discharge.  Patient was in stable condition on day of discharge with no acute concerns.    35 minutes spent on discharge. Time calculated includes outpatient care coordination, bedside education, and counselling.     **Of note, this documentation is completed using the Dragon Dictation system (voice recognition software). There may be spelling and/or grammatical errors that were not corrected prior to final submission.**        Pasha Bliss MD    DISCHARGE PHYSICAL EXAM     Last Recorded Vitals:  Vitals:    06/27/24 2043 06/28/24 0020 06/28/24 0730 06/28/24 1135   BP: 121/86 99/62 119/55    BP Location:       Patient Position:       Pulse: 69 68 63 60   Resp:  17 17    Temp:  36.3 °C (97.3 °F) 36.8 °C (98.2 °F)    TempSrc:   Temporal    SpO2: 91% 92% 91% 96%   Weight:       Height:           Physical Exam  General: Well-developed adult male in mild distress  HEENT: Clear sclera, EOMI, trachea midline, moist mucous membranes, rhinorrhea  Respiratory: Equal chest rise, no retractions  Abdomen: Soft, nontender, nondistended  Psychiatric: Appropriate mood and affect  Skin: Warm, dry    DISCHARGE MEDICATIONS        Your medication list        START taking these medications        Instructions Last Dose Given Next Dose Due   benzocaine-menthol lozenge  Commonly known as: Cepastat Sore Throat      Dissolve 1 lozenge in the mouth every 2 hours if needed for sore throat.       metoprolol succinate XL 25 mg 24 hr tablet  Commonly known as: Toprol-XL  Start taking on: June 29, 2024      Take 1 tablet (25 mg) by mouth once daily. Do not crush or chew.              CONTINUE  taking these medications        Instructions Last Dose Given Next Dose Due   albuterol 90 mcg/actuation inhaler           aspirin 81 mg chewable tablet           atorvastatin 80 mg tablet  Commonly known as: Lipitor      Take 1 tablet (80 mg) by mouth once daily.       clopidogrel 75 mg tablet  Commonly known as: Plavix      Take 1 tablet (75 mg) by mouth once daily.       escitalopram 10 mg tablet  Commonly known as: Lexapro      Take 1 tablet (10 mg) by mouth once daily.       esomeprazole 40 mg DR capsule  Commonly known as: NexIUM      Take 1 capsule (40 mg) by mouth once daily.                 Where to Get Your Medications        These medications were sent to [id number] - [place], [place] - [address] AT [facility] & [address] [address]      Hours: 24-hours Phone: [phone]   benzocaine-menthol lozenge  metoprolol succinate XL 25 mg 24 hr tablet           OUTPATIENT FOLLOW-UP     Future Appointments   Date Time Provider Department Center   7/22/2024  8:15 AM Dane Estrada MD [id number] [place]   3/13/2025  9:00 AM [facility] 2 [facility]   3/20/2025  9:30 AM Maurice Jaquez MD [id number] [place]

## 2024-06-28 NOTE — PROGRESS NOTES
Physical Therapy    Physical Therapy Treatment    Patient Name: Lukas Shen  MRN: [id number]  Today's Date: 6/28/2024  Time Calculation  Start Time: 1135  Stop Time: 1202  Time Calculation (min): 27 min    Assessment/Plan   PT Assessment  End of Session Communication: Bedside nurse, PCT/NA/CTA  End of Session Patient Position: Up in chair (pt. set up for lunch)     PT Plan  Treatment/Interventions: Bed mobility, Transfer training, Gait training  PT Plan: Ongoing PT  PT Frequency: 3 times per week    General Visit Information:   PT  Visit  PT Received On: 06/28/24  General  Prior to Session Communication: Bedside nurse  Patient Position Received: Up in bathroom  General Comment:  (Pt. seated on bsc, nursing was present, pt. with no complaints stating he is doing better. Pt. states he usually uses a spc but agreeable to use walker says uses walker on occassion also.)    Subjective   Precautions:  Precautions  Precautions Comment:  (Covid, high fall risk, purewick, iv)  Vital Signs:  Vital Signs  Heart Rate: 60  SpO2: 96 % (room air)  Patient Position: Sitting    Treatments    Bed Mobility  Bed Mobility:  (cg /min rolling and scooting)    Ambulation/Gait Training  Ambulation/Gait Training Performed:  (gait training in room with fww and close cg rigid controlled posture ambulating  5' bed<> bsc sidesteps 10' x 1, 15' x 1)  Transfers  Transfer:  (sit<> supine min/cg, sit to stand with close cg from varying surfaces, bed<> bsc with walker and cg)    Outcome Measures:  [facility] Basic Mobility  Turning from your back to your side while in a flat bed without using bedrails: A little  Moving from lying on your back to sitting on the side of a flat bed without using bedrails: A little  Moving to and from bed to chair (including a wheelchair): A little  Standing up from a chair using your arms (e.g. wheelchair or bedside chair): A little  To walk in hospital room: A little  Climbing 3-5 steps with railing: Total  Basic Mobility -  "Total Score: 16    EDUCATION:  Outpatient Education  Individual(s) Educated: Patient  Education Provided: Fall Risk, Posture  Patient Response to Education: Patient/Caregiver Verbalized Understanding of Information  Education Comment:  (Pt. stating \"do you want to see what I do\" and proceeded to carry device and ambulate. pt.educated on safe use of device and high fall potential with unsafe use of device.verbal and tactile education given during gait training.)    Encounter Problems       Encounter Problems (Active)       PT Problem       Pt. will transfer supine/sit with supervision (Progressing)       Start:  06/27/24    Expected End:  07/11/24            Pt. will transfer sit/stand with FWW with SBA (Progressing)       Start:  06/27/24    Expected End:  07/11/24            Pt.will ambulate 50' with FWW with SBA (Progressing)       Start:  06/27/24    Expected End:  07/11/24            Pt. will perform 2 x 15 B LE AROM exercises  (Progressing)       Start:  06/27/24    Expected End:  07/11/24               Pain - Adult              "

## 2024-07-01 ENCOUNTER — PATIENT OUTREACH (OUTPATIENT)
Dept: CARE COORDINATION | Facility: CLINIC | Age: 74
End: 2024-07-01
Payer: MEDICARE

## 2024-07-01 NOTE — PROGRESS NOTES
Discharge Facility:[facility]  Discharge Diagnosis:COVID-9 Pneumonia  Syncope and collapse  Recrudescence of old stroke symptoms  Aspiration pneumonia ruled out after investigation  Hx CVA, HTN    Admission Date:6.26.24  Discharge Date: 6.28.24    PCP Appointment Date:Messaged office as follow up appt was not available within 14 days   Specialist Appointment Date:   Hospital Encounter and Summary: Linked   See discharge assessment below for further details   Engagement  Call Start Time: 1243 (7/1/2024 12:43 PM)    Medications  Medications reviewed with patient/caregiver?: Yes (7/1/2024 12:43 PM)  Is the patient having any side effects they believe may be caused by any medication additions or changes?: No (7/1/2024 12:43 PM)  Does the patient have all medications ordered at discharge?: Yes (7/1/2024 12:43 PM)  Care Management Interventions: No intervention needed (7/1/2024 12:43 PM)  Is the patient taking all medications as directed (includes completed medication regime)?: Yes (7/1/2024 12:43 PM)  Care Management Interventions: Provided patient education (7/1/2024 12:43 PM)  Medication Comments: Patient has picked up scripts and understands med changes (7/1/2024 12:43 PM)    Appointments  Does the patient have a primary care provider?: Yes (7/1/2024 12:43 PM)  Care Management Interventions: Verified appointment date/time/provider (7/1/2024 12:43 PM)  Has the patient kept scheduled appointments due by today?: Yes (7/1/2024 12:43 PM)  Care Management Interventions: Advised patient to keep appointment (7/1/2024 12:43 PM)    Self Management  What is the home health agency?: [facility] (7/1/2024 12:43 PM)  Has home health visited the patient within 72 hours of discharge?: Yes (7/1/2024 12:43 PM)  What Durable Medical Equipment (DME) was ordered?: n/a (7/1/2024 12:43 PM)    Patient Teaching  Does the patient have access to their discharge instructions?: Yes (7/1/2024 12:43 PM)  Care Management Interventions: Reviewed  instructions with patient (7/1/2024 12:43 PM)  What is the patient's perception of their health status since discharge?: Improving (7/1/2024 12:43 PM)  Is the patient/caregiver able to teach back the hierarchy of who to call/visit for symptoms/problems? PCP, Specialist, Home Health nurse, Urgent Care, ED, 911: Yes (7/1/2024 12:43 PM)  Patient/Caregiver Education Comments: Spoke with spouse. States patient doing well. Is eating and drinking and denies SOB-not using home O2. C has called and nurse will be out tohouse Wed. Messaged office as follow up appt was not available within 14 days (7/1/2024 12:43 PM)

## 2024-07-04 ENCOUNTER — HOME CARE VISIT (OUTPATIENT)
Dept: HOME HEALTH SERVICES | Facility: HOME HEALTH | Age: 74
End: 2024-07-04

## 2024-07-05 ENCOUNTER — HOME CARE VISIT (OUTPATIENT)
Dept: HOME HEALTH SERVICES | Facility: HOME HEALTH | Age: 74
End: 2024-07-05

## 2024-07-06 ENCOUNTER — HOME CARE VISIT (OUTPATIENT)
Dept: HOME HEALTH SERVICES | Facility: HOME HEALTH | Age: 74
End: 2024-07-06
Payer: MEDICARE

## 2024-07-06 VITALS
OXYGEN SATURATION: 94 % | SYSTOLIC BLOOD PRESSURE: 112 MMHG | RESPIRATION RATE: 18 BRPM | DIASTOLIC BLOOD PRESSURE: 60 MMHG | TEMPERATURE: 97.4 F | HEART RATE: 64 BPM

## 2024-07-06 PROCEDURE — 0023 HH SOC

## 2024-07-06 PROCEDURE — 1090000001 HH PPS REVENUE CREDIT

## 2024-07-06 PROCEDURE — 1090000002 HH PPS REVENUE DEBIT

## 2024-07-06 PROCEDURE — 169592 NO-PAY CLAIM PROCEDURE

## 2024-07-06 PROCEDURE — G0299 HHS/HOSPICE OF RN EA 15 MIN: HCPCS | Mod: HHH

## 2024-07-06 ASSESSMENT — ENCOUNTER SYMPTOMS: DENIES PAIN: 1

## 2024-07-07 ENCOUNTER — HOME CARE VISIT (OUTPATIENT)
Dept: HOME HEALTH SERVICES | Facility: HOME HEALTH | Age: 74
End: 2024-07-07
Payer: MEDICARE

## 2024-07-07 PROCEDURE — 1090000001 HH PPS REVENUE CREDIT

## 2024-07-07 PROCEDURE — G0151 HHCP-SERV OF PT,EA 15 MIN: HCPCS | Mod: HHH

## 2024-07-07 PROCEDURE — 1090000002 HH PPS REVENUE DEBIT

## 2024-07-07 SDOH — HEALTH STABILITY: PHYSICAL HEALTH: EXERCISE COMMENTS: HOME PROGRAM OF PROGRESSIVE AMBUL TO MILD FATIGUE SEVERAL X DAY. HAVE SUPERV WHEN USES CANE

## 2024-07-07 ASSESSMENT — ACTIVITIES OF DAILY LIVING (ADL)
ENTERING_EXITING_HOME: MODERATE ASSIST
OASIS_M1830: 03
AMBULATION ASSISTANCE ON FLAT SURFACES: 1

## 2024-07-07 ASSESSMENT — ENCOUNTER SYMPTOMS
APPETITE LEVEL: GOOD
COUGH CHARACTERISTICS: PRODUCTIVE
COUGH: 1
CHANGE IN APPETITE: UNCHANGED
MUSCLE WEAKNESS: 1
DENIES PAIN: 1
PERSON REPORTING PAIN: PATIENT

## 2024-07-07 NOTE — HOME HEALTH
pt lives with wife in 1 story home, no stairs to enter. pt id self, wife present during session. pt has used a combination of power wc, standrad wc, ambul with wh walker or cane since had cva 4 years ago. pt uncertain of goals for home p.t. as he and his wife feel he is back to original level of function pre hosp admit [id number]

## 2024-07-08 PROCEDURE — 1090000002 HH PPS REVENUE DEBIT

## 2024-07-08 PROCEDURE — 1090000001 HH PPS REVENUE CREDIT

## 2024-07-09 PROCEDURE — 1090000002 HH PPS REVENUE DEBIT

## 2024-07-09 PROCEDURE — 1090000001 HH PPS REVENUE CREDIT

## 2024-07-10 ENCOUNTER — HOME CARE VISIT (OUTPATIENT)
Dept: HOME HEALTH SERVICES | Facility: HOME HEALTH | Age: 74
End: 2024-07-10
Payer: MEDICARE

## 2024-07-10 PROCEDURE — 1090000002 HH PPS REVENUE DEBIT

## 2024-07-10 PROCEDURE — 1090000001 HH PPS REVENUE CREDIT

## 2024-07-10 PROCEDURE — G0152 HHCP-SERV OF OT,EA 15 MIN: HCPCS | Mod: HHH

## 2024-07-11 PROCEDURE — 1090000001 HH PPS REVENUE CREDIT

## 2024-07-11 PROCEDURE — 1090000002 HH PPS REVENUE DEBIT

## 2024-07-11 SDOH — ECONOMIC STABILITY: HOUSING INSECURITY: HOME SAFETY: LIVES WITH WIFE AND DOG IN 1 STORY HOME.

## 2024-07-11 ASSESSMENT — ACTIVITIES OF DAILY LIVING (ADL)
TOILETING: MODERATE ASSIST
HOUSEKEEPING ASSESSED: 1
TOILETING: 1
LIGHT HOUSEKEEPING: DEPENDENT
LAUNDRY ASSESSED: 1
AMBULATION ASSISTANCE: 1
AMBULATION ASSISTANCE: SUPERVISION
BATHING_CURRENT_FUNCTION: MODERATE ASSIST
DRESSING_LB_CURRENT_FUNCTION: MODERATE ASSIST
BATHING ASSESSED: 1
GROOMING_CURRENT_FUNCTION: MODERATE ASSIST
DRESSING_UB_CURRENT_FUNCTION: MODERATE ASSIST
PREPARING MEALS: NEEDS ASSISTANCE
BATHING EQUIPMENT USED: TUB TRANSFER BENCH
LAUNDRY: DEPENDENT
GROOMING ASSESSED: 1

## 2024-07-11 ASSESSMENT — ENCOUNTER SYMPTOMS
DENIES PAIN: 1
PERSON REPORTING PAIN: PATIENT

## 2024-07-12 ENCOUNTER — HOME CARE VISIT (OUTPATIENT)
Dept: HOME HEALTH SERVICES | Facility: HOME HEALTH | Age: 74
End: 2024-07-12
Payer: MEDICARE

## 2024-07-12 PROCEDURE — 1090000002 HH PPS REVENUE DEBIT

## 2024-07-12 PROCEDURE — 1090000001 HH PPS REVENUE CREDIT

## 2024-07-12 PROCEDURE — G0180 MD CERTIFICATION HHA PATIENT: HCPCS | Performed by: INTERNAL MEDICINE

## 2024-07-13 PROCEDURE — 1090000002 HH PPS REVENUE DEBIT

## 2024-07-13 PROCEDURE — 1090000001 HH PPS REVENUE CREDIT

## 2024-07-16 ENCOUNTER — PATIENT OUTREACH (OUTPATIENT)
Dept: CARE COORDINATION | Facility: CLINIC | Age: 74
End: 2024-07-16
Payer: MEDICARE

## 2024-07-17 ENCOUNTER — APPOINTMENT (OUTPATIENT)
Dept: HOME HEALTH SERVICES | Facility: HOME HEALTH | Age: 74
End: 2024-07-17
Payer: MEDICARE

## 2024-07-22 ENCOUNTER — APPOINTMENT (OUTPATIENT)
Dept: CARDIOLOGY | Facility: CLINIC | Age: 74
End: 2024-07-22
Payer: MEDICARE

## 2024-07-22 VITALS
SYSTOLIC BLOOD PRESSURE: 104 MMHG | HEART RATE: 72 BPM | DIASTOLIC BLOOD PRESSURE: 66 MMHG | HEIGHT: 68 IN | BODY MASS INDEX: 25.96 KG/M2 | WEIGHT: 171.3 LBS

## 2024-07-22 DIAGNOSIS — Z86.73 HISTORY OF CVA (CEREBROVASCULAR ACCIDENT): ICD-10-CM

## 2024-07-22 DIAGNOSIS — I10 ESSENTIAL HYPERTENSION: ICD-10-CM

## 2024-07-22 DIAGNOSIS — E78.00 PURE HYPERCHOLESTEROLEMIA: ICD-10-CM

## 2024-07-22 DIAGNOSIS — I65.21 CAROTID STENOSIS, RIGHT: ICD-10-CM

## 2024-07-22 DIAGNOSIS — R55 SYNCOPE AND COLLAPSE: ICD-10-CM

## 2024-07-22 DIAGNOSIS — F17.201 TOBACCO ABUSE, IN REMISSION: ICD-10-CM

## 2024-07-22 PROCEDURE — 3078F DIAST BP <80 MM HG: CPT | Performed by: INTERNAL MEDICINE

## 2024-07-22 PROCEDURE — 3074F SYST BP LT 130 MM HG: CPT | Performed by: INTERNAL MEDICINE

## 2024-07-22 PROCEDURE — 99214 OFFICE O/P EST MOD 30 MIN: CPT | Performed by: INTERNAL MEDICINE

## 2024-07-22 PROCEDURE — 1111F DSCHRG MED/CURRENT MED MERGE: CPT | Performed by: INTERNAL MEDICINE

## 2024-07-22 PROCEDURE — 1036F TOBACCO NON-USER: CPT | Performed by: INTERNAL MEDICINE

## 2024-07-22 PROCEDURE — 3008F BODY MASS INDEX DOCD: CPT | Performed by: INTERNAL MEDICINE

## 2024-07-22 PROCEDURE — 1159F MED LIST DOCD IN RCRD: CPT | Performed by: INTERNAL MEDICINE

## 2024-07-22 PROCEDURE — 1123F ACP DISCUSS/DSCN MKR DOCD: CPT | Performed by: INTERNAL MEDICINE

## 2024-07-22 ASSESSMENT — ENCOUNTER SYMPTOMS
CARDIOVASCULAR NEGATIVE: 1
NEUROLOGICAL NEGATIVE: 1
CONSTITUTIONAL NEGATIVE: 1
RESPIRATORY NEGATIVE: 1

## 2024-07-22 NOTE — PROGRESS NOTES
CARDIOLOGY OFFICE VISIT      CHIEF COMPLAINT  Chief Complaint   Patient presents with    Hospital Follow-up     Hospital follow up due to syncope.        HISTORY OF PRESENT ILLNESS  Lukas Shen is a 74 y.o. year old male patient with a history of prior CVA with left hemiparesis, hypertension, dyslipidemia and carotid stenosis will presented with recent cough congestion that was secondary to COVID-19 infection as well as pneumonia.  He had severe cough and subsequently passed out and cardiology was consulted for syncope/presyncope.  He had an echocardiogram done that shows normal LV function with no gross valvular abnormalities.  He had some frequent PACs for which he was started on metoprolol which has been making him very sleepy and fatigued according to the wife.  He continues to deny any chest pain or significant shortness of breath with his day-to-day activities.  He had carotid Dopplers in February 2024 which showed less than 50% stenosis in both internal carotid arteries.    ASSESSMENT AND PLAN  1.  Presyncope/syncope: This appears to be likely secondary to persistent cough and increased vagal reaction, with no further recurrences.  His cardiac workup so far was unremarkable.  At this time, I do not think he needs to continue with the metoprolol which was started in the setting of COVID-19/pneumonia with frequent PACs which seems to have resolved.  2.  S/p CVA with left-sided residuals: Continue with current dual antiplatelet therapy.  3.  Dyslipidemia: Continue lipid-lowering therapy.    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       Social History  Social History     Tobacco Use    Smoking status:  "Former     Current packs/day: 0.00     Types: Cigarettes     Quit date: 2018     Years since quittin.5    Smokeless tobacco: Never   Vaping Use    Vaping status: Never Used   Substance Use Topics    Alcohol use: Not Currently    Drug use: Never       Family History     Family History   Problem Relation Name Age of Onset    Alzheimer's disease Mother      Heart attack Father          Allergies:  No Known Allergies     Outpatient Medications:  Current Outpatient Medications   Medication Instructions    albuterol 90 mcg/actuation inhaler 4 puffs, inhalation, Every 6 hours PRN    aspirin 81 mg, oral, Daily    atorvastatin (LIPITOR) 80 mg, oral, Daily    benzocaine-menthol (Cepastat Sore Throat) lozenge 1 lozenge, Mouth/Throat, Every 2 hour PRN    clopidogrel (PLAVIX) 75 mg, oral, Daily    docusate sodium (COLACE) 100 mg, oral, Daily    escitalopram (LEXAPRO) 10 mg, oral, Daily    esomeprazole (NEXIUM) 40 mg, oral, Daily    metoprolol succinate XL (TOPROL-XL) 25 mg, oral, Daily, Do not crush or chew.        Recent Lab Results:    CBC:   Lab Results   Component Value Date    WBC 9.3 2024    RBC 4.38 (L) 2024    HGB 13.8 2024    HCT 40.2 (L) 2024     2024        CMP:    Lab Results   Component Value Date     2024    K 4.0 2024     2024    CO2 24 2024    BUN 16 2024    CREATININE 1.23 2024    GLUCOSE 128 (H) 2024    CALCIUM 8.6 2024       Magnesium:    Lab Results   Component Value Date    MG 2.19 2024       Lipid Profile:    Lab Results   Component Value Date    TRIG 123 02/15/2024    HDL 34.8 02/15/2024    LDLCALC 70 02/15/2024    LDLDIRECT 66 2022       TSH:    No results found for: \"TSH\"    BNP:   Lab Results   Component Value Date    BNP 26 2024        PT/INR:    Lab Results   Component Value Date    PROTIME 12.5 2024    INR 1.1 2024       HgBA1c:    Lab Results   Component Value Date "    HGBA1C 5.3 04/18/2021       BMP:  Lab Results   Component Value Date     06/28/2024     06/27/2024     06/26/2024    K 4.0 06/28/2024    K 4.4 06/27/2024    K 4.1 06/26/2024     06/28/2024     06/27/2024     06/26/2024    CO2 24 06/28/2024    CO2 24 06/27/2024    CO2 26 06/26/2024    BUN 16 06/28/2024    BUN 16 06/27/2024    BUN 18 06/26/2024    CREATININE 1.23 06/28/2024    CREATININE 1.36 (H) 06/27/2024    CREATININE 1.35 (H) 06/26/2024       CBC:  Lab Results   Component Value Date    WBC 9.3 06/28/2024    WBC 10.2 06/27/2024    WBC 12.8 (H) 06/26/2024    RBC 4.38 (L) 06/28/2024    RBC 4.45 (L) 06/27/2024    RBC 4.64 06/26/2024    HGB 13.8 06/28/2024    HGB 14.0 06/27/2024    HGB 14.5 06/26/2024    HCT 40.2 (L) 06/28/2024    HCT 42.0 06/27/2024    HCT 42.4 06/26/2024    MCV 92 06/28/2024    MCV 94 06/27/2024    MCV 91 06/26/2024    MCH 31.5 06/28/2024    MCH 31.5 06/27/2024    MCH 31.3 06/26/2024    MCHC 34.3 06/28/2024    MCHC 33.3 06/27/2024    MCHC 34.2 06/26/2024    RDW 12.3 06/28/2024    RDW 12.5 06/27/2024    RDW 12.7 06/26/2024     06/28/2024     06/27/2024     06/26/2024       Cardiac Enzymes:    Lab Results   Component Value Date    TROPHS 5 06/26/2024    TROPHS 7 06/26/2024    TROPHS 3 08/03/2023       Hepatic Function Panel:    Lab Results   Component Value Date    ALKPHOS 95 06/27/2024    ALT 8 (L) 06/27/2024    AST 15 06/27/2024    PROT 6.6 06/27/2024    BILITOT 0.7 06/27/2024    BILIDIR 0.1 04/17/2021         REVIEW OF SYSTEMS  Review of Systems   Constitutional: Negative.   Cardiovascular: Negative.    Respiratory: Negative.     Musculoskeletal:         Patient has residual  side effects of CVA when walking.   Neurological: Negative.    All other systems reviewed and are negative.      VITALS  Vitals:    07/22/24 0824   BP: 104/66   Pulse: 72     Wt Readings from Last 4 Encounters:   07/22/24 77.7 kg (171 lb 4.8 oz)   06/26/24 78.9 kg (174  lb)   03/14/24 78 kg (172 lb)   02/15/24 77.6 kg (171 lb)       PHYSICAL EXAM  Constitutional:       Appearance: Healthy appearance.   Eyes:      Pupils: Pupils are equal, round, and reactive to light.   Pulmonary:      Effort: Pulmonary effort is normal.      Breath sounds: Normal breath sounds.   Cardiovascular:      PMI at left midclavicular line. Normal rate. Regular rhythm.      Murmurs: There is no murmur.      No gallop.  No click. No rub.   Pulses:     Intact distal pulses.   Musculoskeletal: Normal range of motion.      Cervical back: Normal range of motion. Skin:     General: Skin is warm and dry.   Neurological:      General: No focal deficit present.      Mental Status: Alert and oriented to person, place and time.

## 2024-07-22 NOTE — PATIENT INSTRUCTIONS
Wean off metoprolol    Patient educated on proper medication use.   Patient educated on risk factor modification.   Please bring any lab results from other providers / physicians to your next appointment.     Please bring all medicines, vitamins, and herbal supplements with you when you come to the office.     Prescriptions will not be filled unless you are compliant with your follow up appointments or have a follow up appointment scheduled as per instruction of your physician. Refills should be requested at the time of your visit.  3  month visit

## 2024-07-23 ENCOUNTER — HOME CARE VISIT (OUTPATIENT)
Dept: HOME HEALTH SERVICES | Facility: HOME HEALTH | Age: 74
End: 2024-07-23
Payer: MEDICARE

## 2024-07-23 ASSESSMENT — ACTIVITIES OF DAILY LIVING (ADL)
HOME_HEALTH_OASIS: 01
OASIS_M1830: 02

## 2024-07-29 DIAGNOSIS — E78.00 PURE HYPERCHOLESTEROLEMIA: ICD-10-CM

## 2024-07-29 DIAGNOSIS — I65.21 CAROTID STENOSIS, RIGHT: ICD-10-CM

## 2024-07-29 RX ORDER — ATORVASTATIN CALCIUM 80 MG/1
80 TABLET, FILM COATED ORAL DAILY
Qty: 90 TABLET | Refills: 3 | Status: SHIPPED | OUTPATIENT
Start: 2024-07-29

## 2024-08-06 ENCOUNTER — APPOINTMENT (OUTPATIENT)
Dept: PRIMARY CARE | Facility: CLINIC | Age: 74
End: 2024-08-06
Payer: MEDICARE

## 2024-08-06 VITALS
DIASTOLIC BLOOD PRESSURE: 60 MMHG | TEMPERATURE: 96.9 F | BODY MASS INDEX: 25.91 KG/M2 | SYSTOLIC BLOOD PRESSURE: 90 MMHG | HEART RATE: 78 BPM | WEIGHT: 171 LBS | HEIGHT: 68 IN

## 2024-08-06 DIAGNOSIS — G81.94 LEFT HEMIPARESIS (MULTI): ICD-10-CM

## 2024-08-06 DIAGNOSIS — E78.00 PURE HYPERCHOLESTEROLEMIA: ICD-10-CM

## 2024-08-06 DIAGNOSIS — R55 SYNCOPE AND COLLAPSE: Primary | ICD-10-CM

## 2024-08-06 DIAGNOSIS — I65.21 CAROTID STENOSIS, RIGHT: ICD-10-CM

## 2024-08-06 PROCEDURE — 1123F ACP DISCUSS/DSCN MKR DOCD: CPT | Performed by: INTERNAL MEDICINE

## 2024-08-06 PROCEDURE — 1160F RVW MEDS BY RX/DR IN RCRD: CPT | Performed by: INTERNAL MEDICINE

## 2024-08-06 PROCEDURE — 1036F TOBACCO NON-USER: CPT | Performed by: INTERNAL MEDICINE

## 2024-08-06 PROCEDURE — 1159F MED LIST DOCD IN RCRD: CPT | Performed by: INTERNAL MEDICINE

## 2024-08-06 PROCEDURE — 3008F BODY MASS INDEX DOCD: CPT | Performed by: INTERNAL MEDICINE

## 2024-08-06 PROCEDURE — 99213 OFFICE O/P EST LOW 20 MIN: CPT | Performed by: INTERNAL MEDICINE

## 2024-08-06 ASSESSMENT — ENCOUNTER SYMPTOMS
NAUSEA: 0
CHOKING: 0
ACTIVITY CHANGE: 0
FATIGUE: 0
ARTHRALGIAS: 0
HEADACHES: 0
GASTROINTESTINAL NEGATIVE: 1
WEAKNESS: 1
DIZZINESS: 0
WHEEZING: 0
SYNCOPE: 1
CHEST TIGHTNESS: 0

## 2024-08-06 NOTE — PROGRESS NOTES
Subjective   Patient ID: Lukas Shen is a 74 y.o. male who presents for Follow-up (Hospital  (not [facility])).  Syncope  This is a new (In , had Covid and has recovered.) problem. The current episode started more than 1 month ago. The problem occurs rarely. The problem has been resolved. There was no loss of consciousness. The symptoms are aggravated by sitting up. Associated symptoms include malaise/fatigue and weakness. Pertinent negatives include no chest pain, dizziness, headaches or nausea. The treatment provided significant relief. His past medical history is significant for CVA. There is no history of arrhythmia or CAD.     Hyperlipidemia  This is a chronic problem. The current episode started more than 1 year ago. The problem is controlled. Recent lipid tests were reviewed and are normal. He has no history of chronic renal disease or diabetes. Pertinent negatives include no chest pain. Current antihyperlipidemic treatment includes statins. The current treatment provides significant improvement of lipids. Risk factors for coronary artery disease include dyslipidemia. High dose statins kept in     Cerebrovascular Accident  This is a chronic problem. The current episode started more than 1 year ago. The problem occurs rarely. The problem has been gradually improving. Associated symptoms include congestion, numbness and weakness. Pertinent negatives include no abdominal pain, anorexia, arthralgias, chest pain or diaphoresis. Recent admission and MRI and echo noted, no new CVA.  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       Social History  Social History     Tobacco Use    Smoking status: Former     Current packs/day: 0.00     Types: Cigarettes     Quit date: 2018     Years since quittin.6    Smokeless tobacco: Never   Vaping Use    Vaping status: Never Used   Substance Use Topics    Alcohol use: Not Currently    Drug use: Never        Family History     Family History   Problem Relation Name Age of Onset    Alzheimer's disease Mother      Heart attack Father         Allergies:  No Known Allergies     Outpatient Medications:  Current Outpatient Medications   Medication Instructions    albuterol 90 mcg/actuation inhaler 4 puffs, inhalation, Every 6 hours PRN    aspirin 81 mg, oral, Daily    atorvastatin (LIPITOR) 80 mg, oral, Daily    clopidogrel (PLAVIX) 75 mg, oral, Daily    docusate sodium (COLACE) 100 mg, oral, Daily    escitalopram (LEXAPRO) 10 mg, oral, Daily    esomeprazole (NEXIUM) 40 mg, oral, Daily        Review of Systems   Constitutional:  Positive for malaise/fatigue. Negative for activity change and fatigue.   Eyes:  Negative for visual disturbance.   Respiratory:  Negative for choking, chest tightness and wheezing.    Cardiovascular:  Positive for syncope. Negative for chest pain.   Gastrointestinal: Negative.  Negative for nausea.   Musculoskeletal:  Negative for arthralgias.   Neurological:  Positive for weakness. Negative for dizziness and headaches.         Objective       Physical Exam  Vitals reviewed.   Constitutional:       Appearance: Normal appearance. He is normal weight.   HENT:      Head: Normocephalic.   Eyes:      Conjunctiva/sclera: Conjunctivae normal.   Cardiovascular:      Rate and Rhythm: Normal rate and regular rhythm.      Pulses: Normal pulses.           Carotid pulses are  on the right side with bruit.  Pulmonary:      Effort: Pulmonary effort is normal.      Breath sounds: Normal breath sounds.   Abdominal:      Palpations: Abdomen is soft.   Musculoskeletal:         General: Normal range of motion.      Cervical back: Neck supple. No rigidity.   Skin:     General: Skin is warm and dry.   Neurological:      Motor: Weakness present.      Gait: Gait abnormal.     BP 90/60 (BP Location: Right arm, Patient Position: Sitting, BP Cuff Size: Adult)   Pulse 78   Temp 36.1 °C (96.9 °F) (Temporal)   Ht 1.727 m  "(5' 8\")   Wt 77.6 kg (171 lb)   BMI 26.00 kg/m²      Assessment/Plan   Problem List Items Addressed This Visit       Left hemiparesis (Multi)    Pure hypercholesterolemia    Carotid stenosis, right    Syncope and collapse - Primary   Recent hospitalization data and information reviewed, all reports, labs, radiological investigations and consultations and follow ups reviewed during this visit. Pt is doing well, precautions to be taken in the future for acute ailments or acute illness and change of condition are discussed, will continue to have close follow up, medication list was reviewed and updated and necessary changes were applied.  It was a COVID-19 and that led to syncope, there was no cough associated, there was no cardiac etiology, echo was normal, they did MRI, there was old stroke, nothing was obvious neurologically, chronic weakness remains, chronic carotid occlusion remains.  Patient is on full dose of statins.  He is always hypotensive, would not consider midodrine like therapy, we are not giving any medications which can cause hypotension, he feels well, he has a hemiparesis and he has a hemiparetic gait.  Multiple CT scans and imagings were done in the hospital all were reviewed.  There was just mucous plugging not necessarily aspiration.  No choking, no coughing seen, he is back to his normal functional status, Lexapro is a chronic treatment, chronic Plavix to be continued, follow-up in 3 months.  "

## 2024-08-20 ENCOUNTER — PATIENT OUTREACH (OUTPATIENT)
Dept: CARE COORDINATION | Facility: CLINIC | Age: 74
End: 2024-08-20
Payer: MEDICARE

## 2024-08-20 NOTE — PROGRESS NOTES
all placed regarding one month post discharge follow up call.              At time of outreach call the patient feels as if their condition has               improved since initial visit with PCP or specialist.              Questions or concerns regarding recovery period addressed at this time.               Reviewed any PCP or specialists progress notes/labs/radiology reports if applicable              and addressed any questions or concerns.

## 2024-09-26 ENCOUNTER — PATIENT OUTREACH (OUTPATIENT)
Dept: PRIMARY CARE | Facility: CLINIC | Age: 74
End: 2024-09-26
Payer: MEDICARE

## 2024-09-26 NOTE — PROGRESS NOTES
Call placed regarding 90 days discharge follow up call.              At time of outreach call the patient feels as if their condition has               returned to baseline.              Questions or concerns regarding recovery period addressed at this time.               Reviewed any PCP or specialists progress notes/labs/radiology reports if applicable              and addressed any questions or concerns.

## 2024-10-23 ENCOUNTER — APPOINTMENT (OUTPATIENT)
Dept: CARDIOLOGY | Facility: CLINIC | Age: 74
End: 2024-10-23
Payer: MEDICARE

## 2024-10-23 VITALS
HEIGHT: 68 IN | SYSTOLIC BLOOD PRESSURE: 112 MMHG | BODY MASS INDEX: 26.13 KG/M2 | HEART RATE: 80 BPM | WEIGHT: 172.4 LBS | DIASTOLIC BLOOD PRESSURE: 70 MMHG

## 2024-10-23 DIAGNOSIS — E78.00 PURE HYPERCHOLESTEROLEMIA: ICD-10-CM

## 2024-10-23 DIAGNOSIS — R55 SYNCOPE AND COLLAPSE: ICD-10-CM

## 2024-10-23 DIAGNOSIS — I65.21 CAROTID STENOSIS, RIGHT: ICD-10-CM

## 2024-10-23 DIAGNOSIS — Z86.73 HISTORY OF CVA (CEREBROVASCULAR ACCIDENT): ICD-10-CM

## 2024-10-23 DIAGNOSIS — F17.201 TOBACCO ABUSE, IN REMISSION: ICD-10-CM

## 2024-10-23 PROCEDURE — 3008F BODY MASS INDEX DOCD: CPT | Performed by: INTERNAL MEDICINE

## 2024-10-23 PROCEDURE — 1159F MED LIST DOCD IN RCRD: CPT | Performed by: INTERNAL MEDICINE

## 2024-10-23 PROCEDURE — 1036F TOBACCO NON-USER: CPT | Performed by: INTERNAL MEDICINE

## 2024-10-23 PROCEDURE — 1123F ACP DISCUSS/DSCN MKR DOCD: CPT | Performed by: INTERNAL MEDICINE

## 2024-10-23 PROCEDURE — 99214 OFFICE O/P EST MOD 30 MIN: CPT | Performed by: INTERNAL MEDICINE

## 2024-10-23 ASSESSMENT — ENCOUNTER SYMPTOMS
CARDIOVASCULAR NEGATIVE: 1
CONSTITUTIONAL NEGATIVE: 1
RESPIRATORY NEGATIVE: 1
NEUROLOGICAL NEGATIVE: 1

## 2024-10-23 NOTE — PROGRESS NOTES
CARDIOLOGY OFFICE VISIT      CHIEF COMPLAINT  Chief Complaint   Patient presents with    Follow-up     3 month follow up        HISTORY OF PRESENT ILLNESS  Lukas Shen is a 74 y.o. year old male patient with a history of prior CVA with left hemiparesis, hypertension, dyslipidemia and carotid stenosis will presented with recent cough congestion that was secondary to COVID-19 infection as well as pneumonia.  He had severe cough and subsequently passed out and cardiology was consulted for syncope/presyncope.  He had an echocardiogram done that shows normal LV function with no gross valvular abnormalities.  He had some frequent PACs for which he was started on metoprolol which has been making him very sleepy and fatigued according to the wife.  He continues to deny any chest pain or significant shortness of breath with his day-to-day activities.  He had carotid Dopplers in February 2024 which showed less than 50% stenosis in both internal carotid arteries.  Lipids from February 2024 shows total cholesterol is 125, HDL is 34, LDL is 70 triglycerides 123    ASSESSMENT AND PLAN  1.  Presyncope/syncope: This appears to be likely secondary to persistent cough and increased vagal reaction, with no further recurrences.  His cardiac workup so far was unremarkable.    2.  S/p CVA with left-sided residuals: Continue with current dual antiplatelet therapy.  3.  Dyslipidemia: Continue lipid-lowering therapy.    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       Social History  Social History     Tobacco Use    Smoking status: Former     Current packs/day: 0.00     Types: Cigarettes     Quit date: 1/1/2018     Years since  "quittin.8    Smokeless tobacco: Never   Vaping Use    Vaping status: Never Used   Substance Use Topics    Alcohol use: Not Currently    Drug use: Never       Family History     Family History   Problem Relation Name Age of Onset    Alzheimer's disease Mother      Heart attack Father          Allergies:  No Known Allergies     Outpatient Medications:  Current Outpatient Medications   Medication Instructions    albuterol 90 mcg/actuation inhaler 4 puffs, Every 6 hours PRN    aspirin 81 mg, Daily    atorvastatin (LIPITOR) 80 mg, oral, Daily    clopidogrel (PLAVIX) 75 mg, oral, Daily    docusate sodium (COLACE) 100 mg, Daily    escitalopram (LEXAPRO) 10 mg, oral, Daily    esomeprazole (NEXIUM) 40 mg, oral, Daily        Recent Lab Results:    CBC:   Lab Results   Component Value Date    WBC 9.3 2024    RBC 4.38 (L) 2024    HGB 13.8 2024    HCT 40.2 (L) 2024     2024        CMP:    Lab Results   Component Value Date     2024    K 4.0 2024     2024    CO2 24 2024    BUN 16 2024    CREATININE 1.23 2024    GLUCOSE 128 (H) 2024    CALCIUM 8.6 2024       Magnesium:    Lab Results   Component Value Date    MG 2.19 2024       Lipid Profile:    Lab Results   Component Value Date    TRIG 123 02/15/2024    HDL 34.8 02/15/2024    LDLCALC 70 02/15/2024    LDLDIRECT 66 2022       TSH:    No results found for: \"TSH\"    BNP:   Lab Results   Component Value Date    BNP 2024        PT/INR:    Lab Results   Component Value Date    PROTIME 12.5 2024    INR 1.1 2024       HgBA1c:    Lab Results   Component Value Date    HGBA1C 5.3 2021       BMP:  Lab Results   Component Value Date     2024     2024     2024    K 4.0 2024    K 4.4 2024    K 4.1 2024     2024     2024     2024    CO2 24 2024    CO2 24 " 06/27/2024    CO2 26 06/26/2024    BUN 16 06/28/2024    BUN 16 06/27/2024    BUN 18 06/26/2024    CREATININE 1.23 06/28/2024    CREATININE 1.36 (H) 06/27/2024    CREATININE 1.35 (H) 06/26/2024       CBC:  Lab Results   Component Value Date    WBC 9.3 06/28/2024    WBC 10.2 06/27/2024    WBC 12.8 (H) 06/26/2024    RBC 4.38 (L) 06/28/2024    RBC 4.45 (L) 06/27/2024    RBC 4.64 06/26/2024    HGB 13.8 06/28/2024    HGB 14.0 06/27/2024    HGB 14.5 06/26/2024    HCT 40.2 (L) 06/28/2024    HCT 42.0 06/27/2024    HCT 42.4 06/26/2024    MCV 92 06/28/2024    MCV 94 06/27/2024    MCV 91 06/26/2024    MCH 31.5 06/28/2024    MCH 31.5 06/27/2024    MCH 31.3 06/26/2024    MCHC 34.3 06/28/2024    MCHC 33.3 06/27/2024    MCHC 34.2 06/26/2024    RDW 12.3 06/28/2024    RDW 12.5 06/27/2024    RDW 12.7 06/26/2024     06/28/2024     06/27/2024     06/26/2024       Cardiac Enzymes:    Lab Results   Component Value Date    TROPHS 5 06/26/2024    TROPHS 7 06/26/2024    TROPHS 3 08/03/2023       Hepatic Function Panel:    Lab Results   Component Value Date    ALKPHOS 95 06/27/2024    ALT 8 (L) 06/27/2024    AST 15 06/27/2024    PROT 6.6 06/27/2024    BILITOT 0.7 06/27/2024    BILIDIR 0.1 04/17/2021         REVIEW OF SYSTEMS  Review of Systems   Constitutional: Negative.   Cardiovascular: Negative.    Respiratory: Negative.     Musculoskeletal:         Patient has residual  side effects of CVA when walking.   Neurological: Negative.    All other systems reviewed and are negative.      VITALS  Vitals:    10/23/24 0833   BP: 112/70   Pulse: 80     Wt Readings from Last 4 Encounters:   10/23/24 78.2 kg (172 lb 6.4 oz)   08/06/24 77.6 kg (171 lb)   07/22/24 77.7 kg (171 lb 4.8 oz)   06/26/24 78.9 kg (174 lb)       PHYSICAL EXAM  Constitutional:       Appearance: Healthy appearance.   Eyes:      Pupils: Pupils are equal, round, and reactive to light.   Pulmonary:      Effort: Pulmonary effort is normal.      Breath sounds:  Normal breath sounds.   Cardiovascular:      PMI at left midclavicular line. Normal rate. Regular rhythm.      Murmurs: There is no murmur.      No gallop.  No click. No rub.   Pulses:     Intact distal pulses.   Musculoskeletal: Normal range of motion.      Cervical back: Normal range of motion. Skin:     General: Skin is warm and dry.   Neurological:      General: No focal deficit present.      Mental Status: Alert and oriented to person, place and time.

## 2024-10-23 NOTE — PATIENT INSTRUCTIONS
Continue same medications and treatments.   Patient educated on proper medication use.   Patient educated on risk factor modification.   Please bring any lab results from other providers / physicians to your next appointment.     Please bring all medicines, vitamins, and herbal supplements with you when you come to the office.     Prescriptions will not be filled unless you are compliant with your follow up appointments or have a follow up appointment scheduled as per instruction of your physician. Refills should be requested at the time of your visit.  6 month  visit

## 2024-11-08 ENCOUNTER — LAB (OUTPATIENT)
Dept: LAB | Facility: LAB | Age: 74
End: 2024-11-08
Payer: MEDICARE

## 2024-11-08 ENCOUNTER — APPOINTMENT (OUTPATIENT)
Dept: PRIMARY CARE | Facility: CLINIC | Age: 74
End: 2024-11-08
Payer: MEDICARE

## 2024-11-08 VITALS
BODY MASS INDEX: 26.83 KG/M2 | WEIGHT: 177 LBS | TEMPERATURE: 95.9 F | DIASTOLIC BLOOD PRESSURE: 64 MMHG | HEIGHT: 68 IN | HEART RATE: 67 BPM | SYSTOLIC BLOOD PRESSURE: 100 MMHG

## 2024-11-08 DIAGNOSIS — F32.A DEPRESSION, UNSPECIFIED DEPRESSION TYPE: ICD-10-CM

## 2024-11-08 DIAGNOSIS — I65.21 CAROTID STENOSIS, RIGHT: ICD-10-CM

## 2024-11-08 DIAGNOSIS — Z12.5 PROSTATE CANCER SCREENING: ICD-10-CM

## 2024-11-08 DIAGNOSIS — K21.9 GASTROESOPHAGEAL REFLUX DISEASE WITHOUT ESOPHAGITIS: ICD-10-CM

## 2024-11-08 DIAGNOSIS — G81.94 LEFT HEMIPARESIS (MULTI): Primary | ICD-10-CM

## 2024-11-08 DIAGNOSIS — Z12.12 SCREENING FOR COLORECTAL CANCER: ICD-10-CM

## 2024-11-08 DIAGNOSIS — E78.00 PURE HYPERCHOLESTEROLEMIA: ICD-10-CM

## 2024-11-08 DIAGNOSIS — Z12.11 SCREENING FOR COLORECTAL CANCER: ICD-10-CM

## 2024-11-08 LAB
CHOLEST SERPL-MCNC: 127 MG/DL (ref 0–199)
CHOLESTEROL/HDL RATIO: 4.4
HDLC SERPL-MCNC: 28.9 MG/DL
LDLC SERPL CALC-MCNC: 66 MG/DL
NON HDL CHOLESTEROL: 98 MG/DL (ref 0–149)
PSA SERPL-MCNC: 1.03 NG/ML
TRIGL SERPL-MCNC: 163 MG/DL (ref 0–149)
VLDL: 33 MG/DL (ref 0–40)

## 2024-11-08 PROCEDURE — 1036F TOBACCO NON-USER: CPT | Performed by: INTERNAL MEDICINE

## 2024-11-08 PROCEDURE — 3008F BODY MASS INDEX DOCD: CPT | Performed by: INTERNAL MEDICINE

## 2024-11-08 PROCEDURE — 1160F RVW MEDS BY RX/DR IN RCRD: CPT | Performed by: INTERNAL MEDICINE

## 2024-11-08 PROCEDURE — 80061 LIPID PANEL: CPT

## 2024-11-08 PROCEDURE — 36415 COLL VENOUS BLD VENIPUNCTURE: CPT

## 2024-11-08 PROCEDURE — G0103 PSA SCREENING: HCPCS

## 2024-11-08 PROCEDURE — 1159F MED LIST DOCD IN RCRD: CPT | Performed by: INTERNAL MEDICINE

## 2024-11-08 PROCEDURE — 99213 OFFICE O/P EST LOW 20 MIN: CPT | Performed by: INTERNAL MEDICINE

## 2024-11-08 PROCEDURE — 1123F ACP DISCUSS/DSCN MKR DOCD: CPT | Performed by: INTERNAL MEDICINE

## 2024-11-08 RX ORDER — CLOPIDOGREL BISULFATE 75 MG/1
75 TABLET ORAL DAILY
Qty: 90 TABLET | Refills: 3 | Status: SHIPPED | OUTPATIENT
Start: 2024-11-08

## 2024-11-08 RX ORDER — ESCITALOPRAM OXALATE 10 MG/1
10 TABLET ORAL DAILY
Qty: 90 TABLET | Refills: 3 | Status: SHIPPED | OUTPATIENT
Start: 2024-11-08

## 2024-11-08 RX ORDER — ESOMEPRAZOLE MAGNESIUM 40 MG/1
40 CAPSULE, DELAYED RELEASE ORAL DAILY
Qty: 90 CAPSULE | Refills: 3 | Status: SHIPPED | OUTPATIENT
Start: 2024-11-08

## 2024-11-08 ASSESSMENT — ENCOUNTER SYMPTOMS
CHOKING: 0
RESPIRATORY NEGATIVE: 1
NERVOUS/ANXIOUS: 0
ARTHRALGIAS: 1
WEAKNESS: 1
DEPRESSION: 1
OCCASIONAL FEELINGS OF UNSTEADINESS: 1
INSOMNIA: 0
LOSS OF SENSATION IN FEET: 0
SLEEP QUALITY: FAIR
CONSTITUTIONAL NEGATIVE: 1
CARDIOVASCULAR NEGATIVE: 1
GASTROINTESTINAL NEGATIVE: 1
DEPRESSION: 0

## 2024-11-08 ASSESSMENT — PATIENT HEALTH QUESTIONNAIRE - PHQ9
1. LITTLE INTEREST OR PLEASURE IN DOING THINGS: NOT AT ALL
SUM OF ALL RESPONSES TO PHQ9 QUESTIONS 1 AND 2: 0
2. FEELING DOWN, DEPRESSED OR HOPELESS: NOT AT ALL

## 2024-11-08 ASSESSMENT — COLUMBIA-SUICIDE SEVERITY RATING SCALE - C-SSRS
6. HAVE YOU EVER DONE ANYTHING, STARTED TO DO ANYTHING, OR PREPARED TO DO ANYTHING TO END YOUR LIFE?: NO
2. HAVE YOU ACTUALLY HAD ANY THOUGHTS OF KILLING YOURSELF?: NO
1. IN THE PAST MONTH, HAVE YOU WISHED YOU WERE DEAD OR WISHED YOU COULD GO TO SLEEP AND NOT WAKE UP?: NO

## 2024-11-08 NOTE — PROGRESS NOTES
Subjective   Patient ID: Lukas Shen is a 74 y.o. male who presents for Follow-up (3 mo fu).  Depression  Visit Type: follow-up  Patient is not experiencing: chest pain, choking sensation, fatigue, insomnia, nausea and nervousness/anxiety.  Frequency of symptoms: occasionally   Severity: mild   Sleep quality: fair    Hyperlipidemia  This is a chronic problem. The current episode started more than 1 year ago. The problem is controlled. Recent lipid tests were reviewed and are normal. He has no history of chronic renal disease or diabetes. Pertinent negatives include no chest pain. Current antihyperlipidemic treatment includes statins. The current treatment provides significant improvement of lipids. Risk factors for coronary artery disease include dyslipidemia. High dose statins kept in     Cerebrovascular Accident  This is a chronic problem. The current episode started more than 1 year ago. The problem occurs rarely. The problem has been gradually improving. Associated symptoms include congestion, numbness and weakness. Pertinent negatives include no abdominal pain, anorexia, arthralgias, chest pain or diaphoresis. Recent admission and MRI and echo noted, no new CVA.CT angio reviewed again.  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       Social History  Social History     Tobacco Use    Smoking status: Former     Current packs/day: 0.00     Types: Cigarettes     Quit date: 2018     Years since quittin.8    Smokeless tobacco: Never   Vaping Use    Vaping status: Never Used   Substance Use Topics    Alcohol use: Not Currently    Drug use: Never       Family History     Family History   Problem Relation Name Age of Onset    Alzheimer's disease Mother      Heart attack Father         Allergies:  No Known Allergies     Outpatient Medications:  Current Outpatient Medications   Medication Instructions    albuterol 90 mcg/actuation inhaler 4 puffs,  "Every 6 hours PRN    aspirin 81 mg, Daily    atorvastatin (LIPITOR) 80 mg, oral, Daily    clopidogrel (PLAVIX) 75 mg, oral, Daily    docusate sodium (COLACE) 100 mg, Daily    escitalopram (LEXAPRO) 10 mg, oral, Daily    esomeprazole (NEXIUM) 40 mg, oral, Daily        Review of Systems   Constitutional: Negative.    Respiratory: Negative.  Negative for choking.    Cardiovascular: Negative.    Gastrointestinal: Negative.    Musculoskeletal:  Positive for arthralgias and gait problem.   Neurological:  Positive for weakness.   Psychiatric/Behavioral:  Positive for depression. The patient is not nervous/anxious and does not have insomnia.          Objective       Physical Exam  Vitals reviewed.   Constitutional:       Appearance: Normal appearance. He is normal weight.   HENT:      Head: Normocephalic.   Eyes:      Conjunctiva/sclera: Conjunctivae normal.   Cardiovascular:      Rate and Rhythm: Normal rate and regular rhythm.      Pulses: Normal pulses.   Pulmonary:      Effort: Pulmonary effort is normal.      Breath sounds: Normal breath sounds.   Abdominal:      Palpations: Abdomen is soft.   Musculoskeletal:         General: Normal range of motion.      Cervical back: Neck supple.   Skin:     General: Skin is warm and dry.   Neurological:      General: No focal deficit present.      Motor: Weakness present.      Gait: Gait abnormal.   Psychiatric:         Mood and Affect: Mood normal.     /64 (BP Location: Right arm, Patient Position: Sitting, BP Cuff Size: Adult)   Pulse 67   Temp 35.5 °C (95.9 °F) (Temporal)   Ht 1.727 m (5' 8\")   Wt 80.3 kg (177 lb)   BMI 26.91 kg/m²      Assessment/Plan   Problem List Items Addressed This Visit       Left hemiparesis (Multi) - Primary    Gastroesophageal reflux disease    Relevant Medications    esomeprazole (NexIUM) 40 mg DR capsule    Carotid stenosis, right    Relevant Medications    clopidogrel (Plavix) 75 mg tablet     Other Visit Diagnoses       Depression, " unspecified depression type        Relevant Medications    escitalopram (Lexapro) 10 mg tablet        We talk about depression wife thinks that Lexapro to be continued dose reduction is not necessary, he is doing well, he has old stroke related deficit, he has a totally occluded right carotid artery and 65% narrowing of left carotid artery, CT angio was done, he has a CT angio of the lungs so that will serve as a purpose for lung cancer screening for this year.  Wife did not have any question, he did not fall, he did not have a syncopal episode, he is not on any antihypertensive, he stays on full dose of statin aspirin Plavix, he stays on Nexium.  Spasticity remains, he will require annual PSA, Cologuard, set of laboratories will be done otherwise, physical exam was done, excellent care has been provided by family, follow-up in 3 months, vaccinations can be obtained.

## 2024-12-12 ENCOUNTER — APPOINTMENT (OUTPATIENT)
Dept: RADIOLOGY | Facility: HOSPITAL | Age: 74
End: 2024-12-12
Payer: MEDICARE

## 2024-12-12 ENCOUNTER — APPOINTMENT (OUTPATIENT)
Dept: CARDIOLOGY | Facility: HOSPITAL | Age: 74
End: 2024-12-12
Payer: MEDICARE

## 2024-12-12 ENCOUNTER — HOSPITAL ENCOUNTER (INPATIENT)
Facility: HOSPITAL | Age: 74
LOS: 2 days | Discharge: HOME | End: 2024-12-14
Attending: EMERGENCY MEDICINE | Admitting: INTERNAL MEDICINE
Payer: MEDICARE

## 2024-12-12 DIAGNOSIS — J69.0 ASPIRATION PNEUMONIA OF BOTH LOWER LOBES, UNSPECIFIED ASPIRATION PNEUMONIA TYPE (MULTI): ICD-10-CM

## 2024-12-12 DIAGNOSIS — R19.7 NAUSEA VOMITING AND DIARRHEA: ICD-10-CM

## 2024-12-12 DIAGNOSIS — R11.2 NAUSEA VOMITING AND DIARRHEA: ICD-10-CM

## 2024-12-12 DIAGNOSIS — R55 SYNCOPE AND COLLAPSE: Primary | ICD-10-CM

## 2024-12-12 LAB
ALBUMIN SERPL BCP-MCNC: 4.2 G/DL (ref 3.4–5)
ALP SERPL-CCNC: 107 U/L (ref 33–136)
ALT SERPL W P-5'-P-CCNC: 15 U/L (ref 10–52)
ANION GAP BLDV CALCULATED.4IONS-SCNC: 12 MMOL/L (ref 10–25)
ANION GAP SERPL CALC-SCNC: 13 MMOL/L (ref 10–20)
APPEARANCE UR: ABNORMAL
AST SERPL W P-5'-P-CCNC: 12 U/L (ref 9–39)
BASE EXCESS BLDV CALC-SCNC: 2.5 MMOL/L (ref -2–3)
BASOPHILS # BLD AUTO: 0.02 X10*3/UL (ref 0–0.1)
BASOPHILS NFR BLD AUTO: 0.2 %
BILIRUB SERPL-MCNC: 0.9 MG/DL (ref 0–1.2)
BILIRUB UR STRIP.AUTO-MCNC: NEGATIVE MG/DL
BODY TEMPERATURE: ABNORMAL
BUN SERPL-MCNC: 22 MG/DL (ref 6–23)
CA-I BLDV-SCNC: 1.2 MMOL/L (ref 1.1–1.33)
CALCIUM SERPL-MCNC: 9.5 MG/DL (ref 8.6–10.3)
CAOX CRY #/AREA UR COMP ASSIST: ABNORMAL /HPF
CARDIAC TROPONIN I PNL SERPL HS: 4 NG/L (ref 0–20)
CHLORIDE BLDV-SCNC: 99 MMOL/L (ref 98–107)
CHLORIDE SERPL-SCNC: 101 MMOL/L (ref 98–107)
CO2 SERPL-SCNC: 27 MMOL/L (ref 21–32)
COLOR UR: YELLOW
CREAT SERPL-MCNC: 1.51 MG/DL (ref 0.5–1.3)
EGFRCR SERPLBLD CKD-EPI 2021: 48 ML/MIN/1.73M*2
EOSINOPHIL # BLD AUTO: 0.03 X10*3/UL (ref 0–0.4)
EOSINOPHIL NFR BLD AUTO: 0.3 %
ERYTHROCYTE [DISTWIDTH] IN BLOOD BY AUTOMATED COUNT: 12.7 % (ref 11.5–14.5)
GLUCOSE BLDV-MCNC: 149 MG/DL (ref 74–99)
GLUCOSE SERPL-MCNC: 139 MG/DL (ref 74–99)
GLUCOSE UR STRIP.AUTO-MCNC: NORMAL MG/DL
HCO3 BLDV-SCNC: 28.4 MMOL/L (ref 22–26)
HCT VFR BLD AUTO: 45.9 % (ref 41–52)
HCT VFR BLD EST: 41 % (ref 41–52)
HGB BLD-MCNC: 15.9 G/DL (ref 13.5–17.5)
HGB BLDV-MCNC: 13.7 G/DL (ref 13.5–17.5)
HYALINE CASTS #/AREA URNS AUTO: ABNORMAL /LPF
IMM GRANULOCYTES # BLD AUTO: 0.04 X10*3/UL (ref 0–0.5)
IMM GRANULOCYTES NFR BLD AUTO: 0.3 % (ref 0–0.9)
INHALED O2 CONCENTRATION: 21 %
KETONES UR STRIP.AUTO-MCNC: NEGATIVE MG/DL
LACTATE BLDV-SCNC: 0.4 MMOL/L (ref 0.4–2)
LACTATE BLDV-SCNC: 3.2 MMOL/L (ref 0.4–2)
LACTATE SERPL-SCNC: 1.6 MMOL/L (ref 0.4–2)
LACTATE SERPL-SCNC: 3 MMOL/L (ref 0.4–2)
LEUKOCYTE ESTERASE UR QL STRIP.AUTO: NEGATIVE
LYMPHOCYTES # BLD AUTO: 0.41 X10*3/UL (ref 0.8–3)
LYMPHOCYTES NFR BLD AUTO: 3.5 %
MCH RBC QN AUTO: 31.7 PG (ref 26–34)
MCHC RBC AUTO-ENTMCNC: 34.6 G/DL (ref 32–36)
MCV RBC AUTO: 92 FL (ref 80–100)
MONOCYTES # BLD AUTO: 0.5 X10*3/UL (ref 0.05–0.8)
MONOCYTES NFR BLD AUTO: 4.3 %
MRSA DNA SPEC QL NAA+PROBE: NOT DETECTED
MUCOUS THREADS #/AREA URNS AUTO: ABNORMAL /LPF
NEUTROPHILS # BLD AUTO: 10.71 X10*3/UL (ref 1.6–5.5)
NEUTROPHILS NFR BLD AUTO: 91.4 %
NITRITE UR QL STRIP.AUTO: NEGATIVE
NRBC BLD-RTO: 0 /100 WBCS (ref 0–0)
OXYHGB MFR BLDV: 27.9 % (ref 45–75)
PCO2 BLDV: 48 MM HG (ref 41–51)
PH BLDV: 7.38 PH (ref 7.33–7.43)
PH UR STRIP.AUTO: 6.5 [PH]
PLATELET # BLD AUTO: 239 X10*3/UL (ref 150–450)
PO2 BLDV: 24 MM HG (ref 35–45)
POTASSIUM BLDV-SCNC: 4.3 MMOL/L (ref 3.5–5.3)
POTASSIUM SERPL-SCNC: 4.1 MMOL/L (ref 3.5–5.3)
PROT SERPL-MCNC: 7.3 G/DL (ref 6.4–8.2)
PROT UR STRIP.AUTO-MCNC: ABNORMAL MG/DL
RBC # BLD AUTO: 5.01 X10*6/UL (ref 4.5–5.9)
RBC # UR STRIP.AUTO: NEGATIVE /UL
RBC #/AREA URNS AUTO: ABNORMAL /HPF
SAO2 % BLDV: 28 % (ref 45–75)
SARS-COV-2 RNA RESP QL NAA+PROBE: NOT DETECTED
SODIUM BLDV-SCNC: 135 MMOL/L (ref 136–145)
SODIUM SERPL-SCNC: 137 MMOL/L (ref 136–145)
SP GR UR STRIP.AUTO: 1.03
SQUAMOUS #/AREA URNS AUTO: ABNORMAL /HPF
UROBILINOGEN UR STRIP.AUTO-MCNC: ABNORMAL MG/DL
WBC # BLD AUTO: 11.7 X10*3/UL (ref 4.4–11.3)
WBC #/AREA URNS AUTO: ABNORMAL /HPF

## 2024-12-12 PROCEDURE — 93005 ELECTROCARDIOGRAM TRACING: CPT

## 2024-12-12 PROCEDURE — 96367 TX/PROPH/DG ADDL SEQ IV INF: CPT

## 2024-12-12 PROCEDURE — 87040 BLOOD CULTURE FOR BACTERIA: CPT | Mod: ELYLAB | Performed by: EMERGENCY MEDICINE

## 2024-12-12 PROCEDURE — 83605 ASSAY OF LACTIC ACID: CPT | Performed by: EMERGENCY MEDICINE

## 2024-12-12 PROCEDURE — 87641 MR-STAPH DNA AMP PROBE: CPT | Performed by: EMERGENCY MEDICINE

## 2024-12-12 PROCEDURE — 71260 CT THORAX DX C+: CPT | Performed by: STUDENT IN AN ORGANIZED HEALTH CARE EDUCATION/TRAINING PROGRAM

## 2024-12-12 PROCEDURE — 2500000004 HC RX 250 GENERAL PHARMACY W/ HCPCS (ALT 636 FOR OP/ED): Performed by: EMERGENCY MEDICINE

## 2024-12-12 PROCEDURE — 84132 ASSAY OF SERUM POTASSIUM: CPT | Performed by: EMERGENCY MEDICINE

## 2024-12-12 PROCEDURE — 81001 URINALYSIS AUTO W/SCOPE: CPT | Performed by: EMERGENCY MEDICINE

## 2024-12-12 PROCEDURE — 2500000001 HC RX 250 WO HCPCS SELF ADMINISTERED DRUGS (ALT 637 FOR MEDICARE OP): Performed by: EMERGENCY MEDICINE

## 2024-12-12 PROCEDURE — 71045 X-RAY EXAM CHEST 1 VIEW: CPT

## 2024-12-12 PROCEDURE — 96361 HYDRATE IV INFUSION ADD-ON: CPT

## 2024-12-12 PROCEDURE — 1200000002 HC GENERAL ROOM WITH TELEMETRY DAILY

## 2024-12-12 PROCEDURE — 96366 THER/PROPH/DIAG IV INF ADDON: CPT

## 2024-12-12 PROCEDURE — 70450 CT HEAD/BRAIN W/O DYE: CPT

## 2024-12-12 PROCEDURE — 96365 THER/PROPH/DIAG IV INF INIT: CPT

## 2024-12-12 PROCEDURE — 96375 TX/PRO/DX INJ NEW DRUG ADDON: CPT

## 2024-12-12 PROCEDURE — 1210000001 HC SEMI-PRIVATE ROOM DAILY

## 2024-12-12 PROCEDURE — 71045 X-RAY EXAM CHEST 1 VIEW: CPT | Performed by: RADIOLOGY

## 2024-12-12 PROCEDURE — 36415 COLL VENOUS BLD VENIPUNCTURE: CPT | Performed by: EMERGENCY MEDICINE

## 2024-12-12 PROCEDURE — 74177 CT ABD & PELVIS W/CONTRAST: CPT | Performed by: STUDENT IN AN ORGANIZED HEALTH CARE EDUCATION/TRAINING PROGRAM

## 2024-12-12 PROCEDURE — 2500000004 HC RX 250 GENERAL PHARMACY W/ HCPCS (ALT 636 FOR OP/ED): Performed by: INTERNAL MEDICINE

## 2024-12-12 PROCEDURE — 70450 CT HEAD/BRAIN W/O DYE: CPT | Performed by: RADIOLOGY

## 2024-12-12 PROCEDURE — 87636 SARSCOV2 & INF A&B AMP PRB: CPT | Performed by: EMERGENCY MEDICINE

## 2024-12-12 PROCEDURE — 2500000005 HC RX 250 GENERAL PHARMACY W/O HCPCS: Performed by: EMERGENCY MEDICINE

## 2024-12-12 PROCEDURE — 84484 ASSAY OF TROPONIN QUANT: CPT | Performed by: EMERGENCY MEDICINE

## 2024-12-12 PROCEDURE — 99223 1ST HOSP IP/OBS HIGH 75: CPT | Performed by: INTERNAL MEDICINE

## 2024-12-12 PROCEDURE — 99285 EMERGENCY DEPT VISIT HI MDM: CPT | Mod: 25 | Performed by: EMERGENCY MEDICINE

## 2024-12-12 PROCEDURE — 71260 CT THORAX DX C+: CPT

## 2024-12-12 PROCEDURE — 2550000001 HC RX 255 CONTRASTS: Performed by: EMERGENCY MEDICINE

## 2024-12-12 PROCEDURE — 85025 COMPLETE CBC W/AUTO DIFF WBC: CPT | Performed by: EMERGENCY MEDICINE

## 2024-12-12 RX ORDER — TALC
3 POWDER (GRAM) TOPICAL NIGHTLY PRN
Status: DISCONTINUED | OUTPATIENT
Start: 2024-12-12 | End: 2024-12-14 | Stop reason: HOSPADM

## 2024-12-12 RX ORDER — PANTOPRAZOLE SODIUM 40 MG/1
40 TABLET, DELAYED RELEASE ORAL DAILY
Status: DISCONTINUED | OUTPATIENT
Start: 2024-12-13 | End: 2024-12-14 | Stop reason: HOSPADM

## 2024-12-12 RX ORDER — PANTOPRAZOLE SODIUM 40 MG/10ML
40 INJECTION, POWDER, LYOPHILIZED, FOR SOLUTION INTRAVENOUS DAILY
Status: DISCONTINUED | OUTPATIENT
Start: 2024-12-13 | End: 2024-12-13

## 2024-12-12 RX ORDER — POLYETHYLENE GLYCOL 3350 17 G/17G
17 POWDER, FOR SOLUTION ORAL DAILY PRN
Status: DISCONTINUED | OUTPATIENT
Start: 2024-12-12 | End: 2024-12-14 | Stop reason: HOSPADM

## 2024-12-12 RX ORDER — ACETAMINOPHEN 650 MG/1
650 SUPPOSITORY RECTAL EVERY 4 HOURS PRN
Status: DISCONTINUED | OUTPATIENT
Start: 2024-12-12 | End: 2024-12-14 | Stop reason: HOSPADM

## 2024-12-12 RX ORDER — ONDANSETRON HYDROCHLORIDE 2 MG/ML
4 INJECTION, SOLUTION INTRAVENOUS ONCE
Status: COMPLETED | OUTPATIENT
Start: 2024-12-12 | End: 2024-12-12

## 2024-12-12 RX ORDER — ACETAMINOPHEN 325 MG/1
650 TABLET ORAL ONCE
Status: COMPLETED | OUTPATIENT
Start: 2024-12-12 | End: 2024-12-12

## 2024-12-12 RX ORDER — ONDANSETRON HYDROCHLORIDE 2 MG/ML
4 INJECTION, SOLUTION INTRAVENOUS EVERY 8 HOURS PRN
Status: DISCONTINUED | OUTPATIENT
Start: 2024-12-12 | End: 2024-12-14 | Stop reason: HOSPADM

## 2024-12-12 RX ORDER — ONDANSETRON 4 MG/1
4 TABLET, FILM COATED ORAL EVERY 8 HOURS PRN
Status: DISCONTINUED | OUTPATIENT
Start: 2024-12-12 | End: 2024-12-14 | Stop reason: HOSPADM

## 2024-12-12 RX ORDER — SODIUM CHLORIDE, SODIUM LACTATE, POTASSIUM CHLORIDE, CALCIUM CHLORIDE 600; 310; 30; 20 MG/100ML; MG/100ML; MG/100ML; MG/100ML
100 INJECTION, SOLUTION INTRAVENOUS CONTINUOUS
Status: DISCONTINUED | OUTPATIENT
Start: 2024-12-12 | End: 2024-12-14

## 2024-12-12 RX ORDER — ENOXAPARIN SODIUM 100 MG/ML
40 INJECTION SUBCUTANEOUS EVERY 24 HOURS
Status: DISCONTINUED | OUTPATIENT
Start: 2024-12-12 | End: 2024-12-14 | Stop reason: HOSPADM

## 2024-12-12 RX ORDER — ACETAMINOPHEN 325 MG/1
650 TABLET ORAL EVERY 4 HOURS PRN
Status: DISCONTINUED | OUTPATIENT
Start: 2024-12-12 | End: 2024-12-14 | Stop reason: HOSPADM

## 2024-12-12 RX ORDER — ACETAMINOPHEN 160 MG/5ML
650 SOLUTION ORAL EVERY 4 HOURS PRN
Status: DISCONTINUED | OUTPATIENT
Start: 2024-12-12 | End: 2024-12-14 | Stop reason: HOSPADM

## 2024-12-12 ASSESSMENT — LIFESTYLE VARIABLES
EVER HAD A DRINK FIRST THING IN THE MORNING TO STEADY YOUR NERVES TO GET RID OF A HANGOVER: NO
HAVE PEOPLE ANNOYED YOU BY CRITICIZING YOUR DRINKING: NO
TOTAL SCORE: 0
EVER FELT BAD OR GUILTY ABOUT YOUR DRINKING: NO
HAVE YOU EVER FELT YOU SHOULD CUT DOWN ON YOUR DRINKING: NO

## 2024-12-12 ASSESSMENT — PAIN SCALES - GENERAL
PAINLEVEL_OUTOF10: 0 - NO PAIN
PAINLEVEL_OUTOF10: 0 - NO PAIN
PAINLEVEL_OUTOF10: 7

## 2024-12-12 ASSESSMENT — PAIN - FUNCTIONAL ASSESSMENT: PAIN_FUNCTIONAL_ASSESSMENT: 0-10

## 2024-12-12 ASSESSMENT — COLUMBIA-SUICIDE SEVERITY RATING SCALE - C-SSRS
1. IN THE PAST MONTH, HAVE YOU WISHED YOU WERE DEAD OR WISHED YOU COULD GO TO SLEEP AND NOT WAKE UP?: NO
6. HAVE YOU EVER DONE ANYTHING, STARTED TO DO ANYTHING, OR PREPARED TO DO ANYTHING TO END YOUR LIFE?: NO
2. HAVE YOU ACTUALLY HAD ANY THOUGHTS OF KILLING YOURSELF?: NO

## 2024-12-12 ASSESSMENT — PAIN DESCRIPTION - DESCRIPTORS: DESCRIPTORS: ACHING

## 2024-12-12 ASSESSMENT — PAIN DESCRIPTION - PAIN TYPE: TYPE: ACUTE PAIN

## 2024-12-12 ASSESSMENT — PAIN DESCRIPTION - LOCATION: LOCATION: HEAD

## 2024-12-12 NOTE — ED PROVIDER NOTES
HPI   Chief Complaint   Patient presents with    Syncope     Pt states he feels like crap and has nausea and diarrhea the last few days.       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     Past Surgical History:   Procedure Laterality Date    CARDIAC CATHETERIZATION      CT ANGIO NECK  2023    CT NECK ANGIO W AND WO IV CONTRAST 2023 [facility]    MR HEAD ANGIO WO IV CONTRAST  2020    MR HEAD ANGIO WO IV CONTRAST 2020 [facility]ICAL [facility]GAC[facility]    MR NECK ANGIO WO IV CONTRAST  2020    MR NECK ANGIO WO IV CONTRAST 2020 [facility] CLINICAL LEGAC[facility]    OTHER SURGICAL HISTORY  2021    Venous thrombectomy     Family History   Problem Relation Name Age of Onset    Alzheimer's disease Mother      Heart attack Father       Social History     Tobacco Use    Smoking status: Former     Current packs/day: 0.00     Types: Cigarettes     Quit date: 2018     Years since quittin.9    Smokeless tobacco: Never   Vaping Use    Vaping status: Never Used   Substance Use Topics    Alcohol use: Not Currently    Drug use: Never       Physical Exam   ED Triage Vitals [24 1811]   Temperature Heart Rate Respirations BP   36.6 °C (97.9 °F) 98 16 101/60      Pulse Ox Temp Source Heart Rate Source Patient Position   (!) 92 % Temporal Monitor Lying      BP Location FiO2 (%)     Right arm --       Physical Exam  Vitals reviewed.   Constitutional:       Appearance: He is ill-appearing.   Eyes:      Pupils: Pupils are equal, round, and reactive to light.    Cardiovascular:      Rate and Rhythm: Normal rate and regular rhythm.   Pulmonary:      Effort: Pulmonary effort is normal.   Abdominal:      Palpations: Abdomen is soft.   Neurological:      Mental Status: He is alert. Mental status is at baseline.      Comments: Left arm partially contracted and left leg weak, speech is somewhat slurred, no headaches           ED Course & MDM   Diagnoses as of 12/12/24 2127   Syncope and collapse   Nausea vomiting and diarrhea   Aspiration pneumonia of both lower lobes, unspecified aspiration pneumonia type (Multi)                 No data recorded                                 Medical Decision Making  Interpreted by me shows normal sinus rhythm rate 97 normal QRS normal ST segment  My differential diagnosis  Acute dehydration acute electrolyte imbalance acute sepsis, acute stroke  I ordered CBC chemistry orthostatic vital signs, IV fluids IV Zofran further workup and management to be done based upon results of the test ordered  Patient serum lactate was elevated , elevated white blood cell count, CT scan of the chest abdomen pelvis showed bilateral inspiration pneumonia  Patient was started on IV Zosyn because of the elevated lactate and once the CT scan showed bilateral aspiration pneumonia and was added on IV vancomycin and IV azithromycin.  Patient was discussed with the hospitalist and accepted service.  Labs Reviewed  CBC WITH AUTO DIFFERENTIAL - Abnormal     WBC                           11.7 (*)               nRBC                          0.0                    RBC                           5.01                   Hemoglobin                    15.9                   Hematocrit                    45.9                   MCV                           92                     MCH                           31.7                   MCHC                          34.6                   RDW                           12.7                   Platelets                     239                     Neutrophils %                 91.4                   Immature Granulocytes %, Automated   0.3                    Lymphocytes %                 3.5                    Monocytes %                   4.3                    Eosinophils %                 0.3                    Basophils %                   0.2                    Neutrophils Absolute          10.71 (*)               Immature Granulocytes Absolute, Au*   0.04                   Lymphocytes Absolute          0.41 (*)               Monocytes Absolute            0.50                   Eosinophils Absolute          0.03                   Basophils Absolute            0.02                COMPREHENSIVE METABOLIC PANEL - Abnormal     Glucose                       139 (*)                Sodium                        137                    Potassium                     4.1                    Chloride                      101                    Bicarbonate                   27                     Anion Gap                     13                     Urea Nitrogen                 22                     Creatinine                    1.51 (*)               eGFR                          48 (*)                 Calcium                       9.5                    Albumin                       4.2                    Alkaline Phosphatase          107                    Total Protein                 7.3                    AST                           12                     Bilirubin, Total              0.9                    ALT                           15                  LACTATE - Abnormal     Lactate                       3.0 (*)                  Narrative: Venipuncture immediately after or during the administration of Metamizole may lead to falsely low results. Testing should be performed immediately prior to Metamizole dosing.  BLOOD GAS VENOUS FULL PANEL - Abnormal     POCT pH, Venous               7.38                   POCT pCO2, Venous             48                      POCT pO2, Venous              24 (*)                 POCT SO2, Venous              28 (*)                 POCT Oxy Hemoglobin, Venous   27.9 (*)               POCT Hematocrit Calculated, Venous   41.0                   POCT Sodium, Venous           135 (*)                POCT Potassium, Venous        4.3                    POCT Chloride, Venous         99                     POCT Ionized Calicum, Venous   1.20                   POCT Glucose, Venous          149 (*)                POCT Lactate, Venous          3.2 (*)                POCT Base Excess, Venous      2.5                    POCT HCO3 Calculated, Venous   28.4 (*)               POCT Hemoglobin, Venous       13.7                   POCT Anion Gap, Venous        12.0                   Patient Temperature                                  FiO2                          21                  URINALYSIS WITH REFLEX CULTURE AND MICROSCOPIC - Abnormal     Color, Urine                  Yellow                 Appearance, Urine             Turbid (*)               Specific Gravity, Urine       1.029                  pH, Urine                     6.5                    Protein, Urine                30 (1+) (*)               Glucose, Urine                Normal                 Blood, Urine                  NEGATIVE                Ketones, Urine                NEGATIVE                Bilirubin, Urine              NEGATIVE                Urobilinogen, Urine           2 (1+) (*)               Nitrite, Urine                NEGATIVE                Leukocyte Esterase, Urine     NEGATIVE             URINALYSIS MICROSCOPIC WITH REFLEX CULTURE - Abnormal     WBC, Urine                    1-5                    RBC, Urine                    6-10 (*)               Squamous Epithelial Cells, Urine                          Mucus, Urine                  FEW                    Hyaline Casts, Urine          OCCASIONAL (*)               Calcium Oxalate Crystals, Urine   1+                   TROPONIN I, HIGH SENSITIVITY - Normal     Troponin I, High Sensitivity   4                        Narrative: Less than 99th percentile of normal range cutoff-                Female and children under 18 years old <14 ng/L; Male <21 ng/L: Negative                Repeat testing should be performed if clinically indicated.                                 Female and children under 18 years old 14-50 ng/L; Male 21-50 ng/L:                Consistent with possible cardiac damage and possible increased clinical                 risk. Serial measurements may help to assess extent of myocardial damage.                                 >50 ng/L: Consistent with cardiac damage, increased clinical risk and                myocardial infarction. Serial measurements may help assess extent of                 myocardial damage.                                  NOTE: Children less than 1 year old may have higher baseline troponin                 levels and results should be interpreted in conjunction with the overall                 clinical context.                                 NOTE: Troponin I testing is performed using a different                 testing methodology at [facility] than at other                 [facility]. Direct result comparisons should only                 be made within the same method.  SARS-COV-2 PCR - Normal     Coronavirus 2019, PCR                                  Narrative: This assay has received FDA Emergency Use Authorization (EUA) and is only authorized for the duration of time that circumstances exist to justify the authorization of the emergency use of in vitro diagnostic tests for the detection of SARS-CoV-2 virus and/or diagnosis of COVID-19 infection under section 564(b)(1) of the Act, 21 U.S.C. 360bbb-3(b)(1). This assay is an in vitro diagnostic nucleic acid amplification test for the qualitative detection of SARS-CoV-2 from nasopharyngeal specimens and has been  validated for use at [facility]. Negative results do not preclude COVID-19 infections and should not be used as the sole basis for diagnosis, treatment, or other management decisions.                  BLOOD GAS LACTIC ACID, VENOUS - Normal     POCT Lactate, Venous          0.4                 BLOOD CULTURE  BLOOD CULTURE  MRSA SURVEILLANCE FOR VANCOMYCIN DE-ESCALATION, PCR  URINALYSIS WITH REFLEX CULTURE AND MICROSCOPIC       Narrative: The following orders were created for panel order Urinalysis with Reflex Culture and Microscopic.                Procedure                               Abnormality         Status                                   ---------                               -----------         ------                                   Urinalysis with Reflex C...[[id number]]  Abnormal            Final result                             Extra Urine Gray Tube[[id number]]                            In process                                               Please view results for these tests on the individual orders.  EXTRA URINE GRAY TUBE  LACTATE     CT chest abdomen pelvis w IV contrast   Final Result    CT CHEST:    Aspiration pneumonia/pneumonitis extensively throughout the lower    lobes.          Chronically reactive enlarged mediastinal lymph nodes stable from    prior study in addition to multiple calcified nodes reflecting remote    granulomatous infection.                CT ABDOMEN/PELVIS:    Mild wall thickening of the descending or sigmoid colon concerning    for mild colitis.          Borderline aneurysmal dilatation of the aorta measuring 3 cm x 3 cm.          Cholelithiasis.          MACRO:    None.          Signed by: Natanael Akbar 12/12/2024 8:30 PM    Dictation workstation:   [id number]     CT head wo IV contrast   Final Result    1. Encephalomalacia in the right cerebral hemisphere, consistent with    prior infarct.    2. Diffuse volume loss and periventricular  white matter changes, most    consistent with small vessel ischemic disease.    3. No evidence of acute cortical infarct or intracranial hemorrhage.                MACRO:    None          Signed by: Lalo Gooden 12/12/2024 7:50 PM    Dictation workstation:   [id number]     XR chest 1 view   Final Result    1.  Findings suggestive of vascular congestion/mild edema.                      MACRO:    None          Signed by: Benedict Willingham 12/12/2024 6:24 PM    Dictation workstation:   [id number]              Procedure  Procedures     Adela Flores MD  12/12/24 2127

## 2024-12-13 PROBLEM — R65.21 SEPSIS WITH ACUTE RENAL FAILURE AND SEPTIC SHOCK (MULTI): Status: ACTIVE | Noted: 2024-12-13

## 2024-12-13 PROBLEM — J96.01 ACUTE HYPOXEMIC RESPIRATORY FAILURE (MULTI): Status: ACTIVE | Noted: 2024-12-13

## 2024-12-13 PROBLEM — A41.9 SEPSIS WITH ACUTE RENAL FAILURE AND SEPTIC SHOCK (MULTI): Status: ACTIVE | Noted: 2024-12-13

## 2024-12-13 PROBLEM — J18.9 MULTIFOCAL PNEUMONIA: Status: ACTIVE | Noted: 2024-12-13

## 2024-12-13 PROBLEM — K52.9 ACUTE COLITIS: Status: ACTIVE | Noted: 2024-12-13

## 2024-12-13 PROBLEM — N17.9 SEPSIS WITH ACUTE RENAL FAILURE AND SEPTIC SHOCK (MULTI): Status: ACTIVE | Noted: 2024-12-13

## 2024-12-13 PROBLEM — N17.9 ACUTE KIDNEY INJURY (CMS-HCC): Status: ACTIVE | Noted: 2024-12-13

## 2024-12-13 LAB
ANION GAP SERPL CALC-SCNC: 9 MMOL/L (ref 10–20)
BUN SERPL-MCNC: 18 MG/DL (ref 6–23)
CALCIUM SERPL-MCNC: 7.9 MG/DL (ref 8.6–10.3)
CHLORIDE SERPL-SCNC: 111 MMOL/L (ref 98–107)
CO2 SERPL-SCNC: 23 MMOL/L (ref 21–32)
CREAT SERPL-MCNC: 1.24 MG/DL (ref 0.5–1.3)
EGFRCR SERPLBLD CKD-EPI 2021: 61 ML/MIN/1.73M*2
ERYTHROCYTE [DISTWIDTH] IN BLOOD BY AUTOMATED COUNT: 12.9 % (ref 11.5–14.5)
FLUAV RNA RESP QL NAA+PROBE: NOT DETECTED
FLUBV RNA RESP QL NAA+PROBE: NOT DETECTED
GLUCOSE BLD MANUAL STRIP-MCNC: 147 MG/DL (ref 74–99)
GLUCOSE SERPL-MCNC: 144 MG/DL (ref 74–99)
HCT VFR BLD AUTO: 35.8 % (ref 41–52)
HGB BLD-MCNC: 12.2 G/DL (ref 13.5–17.5)
HOLD SPECIMEN: NORMAL
HOLD SPECIMEN: NORMAL
MCH RBC QN AUTO: 31.1 PG (ref 26–34)
MCHC RBC AUTO-ENTMCNC: 34.1 G/DL (ref 32–36)
MCV RBC AUTO: 91 FL (ref 80–100)
NRBC BLD-RTO: 0 /100 WBCS (ref 0–0)
PLATELET # BLD AUTO: 180 X10*3/UL (ref 150–450)
POTASSIUM SERPL-SCNC: 3.8 MMOL/L (ref 3.5–5.3)
PROCALCITONIN SERPL-MCNC: 2.39 NG/ML
RBC # BLD AUTO: 3.92 X10*6/UL (ref 4.5–5.9)
SODIUM SERPL-SCNC: 139 MMOL/L (ref 136–145)
WBC # BLD AUTO: 13 X10*3/UL (ref 4.4–11.3)

## 2024-12-13 PROCEDURE — 36415 COLL VENOUS BLD VENIPUNCTURE: CPT | Performed by: INTERNAL MEDICINE

## 2024-12-13 PROCEDURE — 2500000001 HC RX 250 WO HCPCS SELF ADMINISTERED DRUGS (ALT 637 FOR MEDICARE OP): Performed by: INTERNAL MEDICINE

## 2024-12-13 PROCEDURE — 92610 EVALUATE SWALLOWING FUNCTION: CPT | Mod: GN

## 2024-12-13 PROCEDURE — 1200000002 HC GENERAL ROOM WITH TELEMETRY DAILY

## 2024-12-13 PROCEDURE — 97165 OT EVAL LOW COMPLEX 30 MIN: CPT | Mod: GO

## 2024-12-13 PROCEDURE — 97161 PT EVAL LOW COMPLEX 20 MIN: CPT | Mod: GP | Performed by: PHYSICAL THERAPIST

## 2024-12-13 PROCEDURE — 80048 BASIC METABOLIC PNL TOTAL CA: CPT | Performed by: INTERNAL MEDICINE

## 2024-12-13 PROCEDURE — 2500000004 HC RX 250 GENERAL PHARMACY W/ HCPCS (ALT 636 FOR OP/ED): Performed by: INTERNAL MEDICINE

## 2024-12-13 PROCEDURE — 85027 COMPLETE CBC AUTOMATED: CPT | Performed by: INTERNAL MEDICINE

## 2024-12-13 PROCEDURE — 84145 PROCALCITONIN (PCT): CPT | Mod: ELYLAB | Performed by: INTERNAL MEDICINE

## 2024-12-13 PROCEDURE — 82947 ASSAY GLUCOSE BLOOD QUANT: CPT

## 2024-12-13 PROCEDURE — 99233 SBSQ HOSP IP/OBS HIGH 50: CPT | Performed by: INTERNAL MEDICINE

## 2024-12-13 RX ORDER — CLOPIDOGREL BISULFATE 75 MG/1
75 TABLET ORAL DAILY
Status: DISCONTINUED | OUTPATIENT
Start: 2024-12-13 | End: 2024-12-14 | Stop reason: HOSPADM

## 2024-12-13 RX ORDER — ATORVASTATIN CALCIUM 80 MG/1
80 TABLET, FILM COATED ORAL DAILY
Status: DISCONTINUED | OUTPATIENT
Start: 2024-12-13 | End: 2024-12-14 | Stop reason: HOSPADM

## 2024-12-13 RX ORDER — NAPROXEN SODIUM 220 MG/1
81 TABLET, FILM COATED ORAL DAILY
Status: DISCONTINUED | OUTPATIENT
Start: 2024-12-13 | End: 2024-12-14 | Stop reason: HOSPADM

## 2024-12-13 RX ORDER — DOXYCYCLINE HYCLATE 100 MG
100 TABLET ORAL EVERY 12 HOURS SCHEDULED
Status: DISCONTINUED | OUTPATIENT
Start: 2024-12-13 | End: 2024-12-14 | Stop reason: HOSPADM

## 2024-12-13 RX ORDER — ESCITALOPRAM OXALATE 10 MG/1
10 TABLET ORAL DAILY
Status: DISCONTINUED | OUTPATIENT
Start: 2024-12-13 | End: 2024-12-14 | Stop reason: HOSPADM

## 2024-12-13 RX ORDER — DOCUSATE SODIUM 100 MG/1
100 CAPSULE, LIQUID FILLED ORAL DAILY
Status: DISCONTINUED | OUTPATIENT
Start: 2024-12-13 | End: 2024-12-14 | Stop reason: HOSPADM

## 2024-12-13 SDOH — SOCIAL STABILITY: SOCIAL NETWORK: HOW OFTEN DO YOU GET TOGETHER WITH FRIENDS OR RELATIVES?: PATIENT DECLINED

## 2024-12-13 SDOH — ECONOMIC STABILITY: FOOD INSECURITY: HOW HARD IS IT FOR YOU TO PAY FOR THE VERY BASICS LIKE FOOD, HOUSING, MEDICAL CARE, AND HEATING?: PATIENT DECLINED

## 2024-12-13 SDOH — SOCIAL STABILITY: SOCIAL INSECURITY
WITHIN THE LAST YEAR, HAVE YOU BEEN RAPED OR FORCED TO HAVE ANY KIND OF SEXUAL ACTIVITY BY YOUR PARTNER OR EX-PARTNER?: NO

## 2024-12-13 SDOH — SOCIAL STABILITY: SOCIAL INSECURITY: ARE THERE ANY APPARENT SIGNS OF INJURIES/BEHAVIORS THAT COULD BE RELATED TO ABUSE/NEGLECT?: NO

## 2024-12-13 SDOH — HEALTH STABILITY: MENTAL HEALTH
DO YOU FEEL STRESS - TENSE, RESTLESS, NERVOUS, OR ANXIOUS, OR UNABLE TO SLEEP AT NIGHT BECAUSE YOUR MIND IS TROUBLED ALL THE TIME - THESE DAYS?: PATIENT DECLINED

## 2024-12-13 SDOH — ECONOMIC STABILITY: HOUSING INSECURITY: IN THE PAST 12 MONTHS, HOW MANY TIMES HAVE YOU MOVED WHERE YOU WERE LIVING?: 0

## 2024-12-13 SDOH — ECONOMIC STABILITY: TRANSPORTATION INSECURITY
IN THE PAST 12 MONTHS, HAS LACK OF TRANSPORTATION KEPT YOU FROM MEDICAL APPOINTMENTS OR FROM GETTING MEDICATIONS?: PATIENT DECLINED

## 2024-12-13 SDOH — SOCIAL STABILITY: SOCIAL INSECURITY: WITHIN THE LAST YEAR, HAVE YOU BEEN AFRAID OF YOUR PARTNER OR EX-PARTNER?: NO

## 2024-12-13 SDOH — SOCIAL STABILITY: SOCIAL INSECURITY: DO YOU FEEL UNSAFE GOING BACK TO THE PLACE WHERE YOU ARE LIVING?: NO

## 2024-12-13 SDOH — HEALTH STABILITY: PHYSICAL HEALTH
HOW OFTEN DO YOU NEED TO HAVE SOMEONE HELP YOU WHEN YOU READ INSTRUCTIONS, PAMPHLETS, OR OTHER WRITTEN MATERIAL FROM YOUR DOCTOR OR PHARMACY?: OFTEN

## 2024-12-13 SDOH — ECONOMIC STABILITY: INCOME INSECURITY
IN THE PAST 12 MONTHS HAS THE ELECTRIC, GAS, OIL, OR WATER COMPANY THREATENED TO SHUT OFF SERVICES IN YOUR HOME?: PATIENT DECLINED

## 2024-12-13 SDOH — ECONOMIC STABILITY: HOUSING INSECURITY: AT ANY TIME IN THE PAST 12 MONTHS, WERE YOU HOMELESS OR LIVING IN A SHELTER (INCLUDING NOW)?: PATIENT DECLINED

## 2024-12-13 SDOH — SOCIAL STABILITY: SOCIAL INSECURITY: HAS ANYONE EVER THREATENED TO HURT YOUR FAMILY OR YOUR PETS?: NO

## 2024-12-13 SDOH — SOCIAL STABILITY: SOCIAL INSECURITY
WITHIN THE LAST YEAR, HAVE YOU BEEN KICKED, HIT, SLAPPED, OR OTHERWISE PHYSICALLY HURT BY YOUR PARTNER OR EX-PARTNER?: NO

## 2024-12-13 SDOH — ECONOMIC STABILITY: FOOD INSECURITY: WITHIN THE PAST 12 MONTHS, THE FOOD YOU BOUGHT JUST DIDN'T LAST AND YOU DIDN'T HAVE MONEY TO GET MORE.: PATIENT DECLINED

## 2024-12-13 SDOH — SOCIAL STABILITY: SOCIAL INSECURITY: WITHIN THE LAST YEAR, HAVE YOU BEEN HUMILIATED OR EMOTIONALLY ABUSED IN OTHER WAYS BY YOUR PARTNER OR EX-PARTNER?: NO

## 2024-12-13 SDOH — SOCIAL STABILITY: SOCIAL INSECURITY: ARE YOU OR HAVE YOU BEEN THREATENED OR ABUSED PHYSICALLY, EMOTIONALLY, OR SEXUALLY BY ANYONE?: NO

## 2024-12-13 SDOH — SOCIAL STABILITY: SOCIAL INSECURITY: DO YOU FEEL ANYONE HAS EXPLOITED OR TAKEN ADVANTAGE OF YOU FINANCIALLY OR OF YOUR PERSONAL PROPERTY?: NO

## 2024-12-13 SDOH — SOCIAL STABILITY: SOCIAL INSECURITY: HAVE YOU HAD THOUGHTS OF HARMING ANYONE ELSE?: NO

## 2024-12-13 SDOH — SOCIAL STABILITY: SOCIAL INSECURITY: ABUSE: ADULT

## 2024-12-13 SDOH — SOCIAL STABILITY: SOCIAL NETWORK: HOW OFTEN DO YOU ATTEND CHURCH OR RELIGIOUS SERVICES?: PATIENT DECLINED

## 2024-12-13 SDOH — HEALTH STABILITY: PHYSICAL HEALTH: ON AVERAGE, HOW MANY MINUTES DO YOU ENGAGE IN EXERCISE AT THIS LEVEL?: 0 MIN

## 2024-12-13 SDOH — SOCIAL STABILITY: SOCIAL INSECURITY: DOES ANYONE TRY TO KEEP YOU FROM HAVING/CONTACTING OTHER FRIENDS OR DOING THINGS OUTSIDE YOUR HOME?: NO

## 2024-12-13 SDOH — SOCIAL STABILITY: SOCIAL NETWORK: IN A TYPICAL WEEK, HOW MANY TIMES DO YOU TALK ON THE PHONE WITH FAMILY, FRIENDS, OR NEIGHBORS?: PATIENT DECLINED

## 2024-12-13 SDOH — SOCIAL STABILITY: SOCIAL NETWORK: HOW OFTEN DO YOU ATTEND MEETINGS OF THE CLUBS OR ORGANIZATIONS YOU BELONG TO?: PATIENT DECLINED

## 2024-12-13 SDOH — SOCIAL STABILITY: SOCIAL INSECURITY: ARE YOU MARRIED, WIDOWED, DIVORCED, SEPARATED, NEVER MARRIED, OR LIVING WITH A PARTNER?: PATIENT DECLINED

## 2024-12-13 SDOH — HEALTH STABILITY: PHYSICAL HEALTH: ON AVERAGE, HOW MANY DAYS PER WEEK DO YOU ENGAGE IN MODERATE TO STRENUOUS EXERCISE (LIKE A BRISK WALK)?: 0 DAYS

## 2024-12-13 SDOH — SOCIAL STABILITY: SOCIAL INSECURITY: HAVE YOU HAD ANY THOUGHTS OF HARMING ANYONE ELSE?: NO

## 2024-12-13 SDOH — ECONOMIC STABILITY: HOUSING INSECURITY: IN THE LAST 12 MONTHS, WAS THERE A TIME WHEN YOU WERE NOT ABLE TO PAY THE MORTGAGE OR RENT ON TIME?: PATIENT DECLINED

## 2024-12-13 ASSESSMENT — ACTIVITIES OF DAILY LIVING (ADL)
BATHING_ASSISTANCE: MINIMAL
LACK_OF_TRANSPORTATION: PATIENT DECLINED
PATIENT'S MEMORY ADEQUATE TO SAFELY COMPLETE DAILY ACTIVITIES?: YES
HEARING - LEFT EAR: FUNCTIONAL
BATHING: DEPENDENT
WALKS IN HOME: DEPENDENT
LACK_OF_TRANSPORTATION: NO
FEEDING YOURSELF: NEEDS ASSISTANCE
ADEQUATE_TO_COMPLETE_ADL: YES
JUDGMENT_ADEQUATE_SAFELY_COMPLETE_DAILY_ACTIVITIES: YES
DRESSING YOURSELF: NEEDS ASSISTANCE
HEARING - RIGHT EAR: FUNCTIONAL
TOILETING: NEEDS ASSISTANCE
GROOMING: NEEDS ASSISTANCE

## 2024-12-13 ASSESSMENT — LIFESTYLE VARIABLES
SKIP TO QUESTIONS 9-10: 1
AUDIT-C TOTAL SCORE: 0
SUBSTANCE_ABUSE_PAST_12_MONTHS: NO
HOW OFTEN DO YOU HAVE 6 OR MORE DRINKS ON ONE OCCASION: NEVER
PRESCIPTION_ABUSE_PAST_12_MONTHS: NO
AUDIT-C TOTAL SCORE: 0
HOW OFTEN DO YOU HAVE A DRINK CONTAINING ALCOHOL: NEVER
HOW MANY STANDARD DRINKS CONTAINING ALCOHOL DO YOU HAVE ON A TYPICAL DAY: PATIENT DOES NOT DRINK

## 2024-12-13 ASSESSMENT — COGNITIVE AND FUNCTIONAL STATUS - GENERAL
DRESSING REGULAR LOWER BODY CLOTHING: A LOT
STANDING UP FROM CHAIR USING ARMS: A LOT
MOVING TO AND FROM BED TO CHAIR: A LITTLE
TURNING FROM BACK TO SIDE WHILE IN FLAT BAD: A LITTLE
PERSONAL GROOMING: A LITTLE
EATING MEALS: A LITTLE
CLIMB 3 TO 5 STEPS WITH RAILING: A LOT
MOBILITY SCORE: 11
DRESSING REGULAR UPPER BODY CLOTHING: A LITTLE
TURNING FROM BACK TO SIDE WHILE IN FLAT BAD: A LITTLE
PERSONAL GROOMING: A LITTLE
DRESSING REGULAR LOWER BODY CLOTHING: A LITTLE
EATING MEALS: A LITTLE
MOVING TO AND FROM BED TO CHAIR: A LITTLE
MOVING FROM LYING ON BACK TO SITTING ON SIDE OF FLAT BED WITH BEDRAILS: A LITTLE
DAILY ACTIVITIY SCORE: 18
CLIMB 3 TO 5 STEPS WITH RAILING: TOTAL
HELP NEEDED FOR BATHING: A LITTLE
TOILETING: A LOT
WALKING IN HOSPITAL ROOM: A LITTLE
TOILETING: A LITTLE
HELP NEEDED FOR BATHING: A LITTLE
MOVING FROM LYING ON BACK TO SITTING ON SIDE OF FLAT BED WITH BEDRAILS: A LOT
PERSONAL GROOMING: A LITTLE
EATING MEALS: A LITTLE
DRESSING REGULAR UPPER BODY CLOTHING: A LITTLE
CLIMB 3 TO 5 STEPS WITH RAILING: TOTAL
TURNING FROM BACK TO SIDE WHILE IN FLAT BAD: A LOT
MOVING FROM LYING ON BACK TO SITTING ON SIDE OF FLAT BED WITH BEDRAILS: A LITTLE
STANDING UP FROM CHAIR USING ARMS: A LITTLE
MOBILITY SCORE: 16
MOBILITY SCORE: 17
STANDING UP FROM CHAIR USING ARMS: A LITTLE
DRESSING REGULAR LOWER BODY CLOTHING: A LOT
TOILETING: A LITTLE
PATIENT BASELINE BEDBOUND: NO
MOVING TO AND FROM BED TO CHAIR: A LOT
HELP NEEDED FOR BATHING: A LOT
WALKING IN HOSPITAL ROOM: A LITTLE
DAILY ACTIVITIY SCORE: 15
DRESSING REGULAR UPPER BODY CLOTHING: A LITTLE
WALKING IN HOSPITAL ROOM: A LOT
DAILY ACTIVITIY SCORE: 17

## 2024-12-13 ASSESSMENT — PAIN SCALES - GENERAL
PAINLEVEL_OUTOF10: 0 - NO PAIN

## 2024-12-13 ASSESSMENT — PATIENT HEALTH QUESTIONNAIRE - PHQ9
1. LITTLE INTEREST OR PLEASURE IN DOING THINGS: NOT AT ALL
SUM OF ALL RESPONSES TO PHQ9 QUESTIONS 1 & 2: 0
2. FEELING DOWN, DEPRESSED OR HOPELESS: NOT AT ALL

## 2024-12-13 ASSESSMENT — PAIN - FUNCTIONAL ASSESSMENT
PAIN_FUNCTIONAL_ASSESSMENT: 0-10

## 2024-12-13 NOTE — H&P
History Of Present Illness  Lukas Shen is a 74 y.o. male presenting with nausea, vomiting, diarrhea, and shortness of breath.  He reported multiple episode of vomiting over the last few days.  Also reported cough for 1 week associated with chills, muscle ache, and not feeling well generally weak.  Today he was very weak and his blood pressure was low at home was not able to stand up or ambulate due to lightheadedness.  He did have an episode of fainting/passing out today so his wife called EMS.    ER course: Patient was awake, alert, oriented, in no acute distress but looked ill.  Patient was tachycardic, hypertensive, and hypoxic required 2 L/min.    His labs shows evidence of acute kidney injury creatinine 1.5 from baseline 1.2.  Patient also has lactic acidosis 3.0, and leukocytosis 11.7.  CT chest/abdomen/pelvis with IV contrast obtained showed evidence of aspiration pneumonia/pneumonitis extensively throughout lower lobes.  There is evidence of colitis in the descending and sigmoid colon.  There is cholelithiasis without evidence of acute cholecystitis.  CT head was negative for acute intracranial pathology but did show chronic changes.  Patient did meet sepsis criteria.  Was started on sepsis protocol and received 4 L IV fluid boluses.  Patient was given vancomycin and Zosyn and azithromycin.    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    Surgical History  He has a past surgical history that includes Other surgical history (02/02/2021); MR angio head wo IV contrast (12/20/2020); MR angio neck wo IV contrast (12/20/2020); CT angio neck (08/04/2023); and Cardiac catheterization.     Social History  He reports that he quit smoking about 6 years ago. His smoking use included cigarettes. He has never used smokeless tobacco. He reports that he does not currently use alcohol. He reports that he does not use drugs.    Family History  Family History   Problem  "Relation Name Age of Onset    Alzheimer's disease Mother      Heart attack Father          Allergies  Patient has no known allergies.    Review of Systems  10/10 points review of system were conducted, negative except as above.    Physical Exam  Constitutional:       General: He is not in acute distress.     Appearance: He is normal weight. He is ill-appearing. He is not diaphoretic.   HENT:      Head: Normocephalic and atraumatic.      Nose: Nose normal.      Mouth/Throat:      Mouth: Mucous membranes are dry.   Eyes:      General: No scleral icterus.     Extraocular Movements: Extraocular movements intact.      Conjunctiva/sclera: Conjunctivae normal.      Pupils: Pupils are equal, round, and reactive to light.   Musculoskeletal:      Cervical back: Normal range of motion and neck supple.   Neurological:      Mental Status: He is alert.          Last Recorded Vitals  Blood pressure 92/55, pulse 82, temperature 37.9 °C (100.2 °F), resp. rate 16, height 1.727 m (5' 8\"), weight 80.3 kg (177 lb), SpO2 93%.    Relevant Results  Scheduled medications  doxycycline, 100 mg, intravenous, q12h  enoxaparin, 40 mg, subcutaneous, q24h  hydrocortisone sodium succinate, 100 mg, intravenous, Once  pantoprazole, 40 mg, oral, Daily   Or  pantoprazole, 40 mg, intravenous, Daily  piperacillin-tazobactam, 3.375 g, intravenous, q8h      Continuous medications  lactated Ringer's, 100 mL/hr      PRN medications  PRN medications: acetaminophen **OR** acetaminophen **OR** acetaminophen, melatonin, ondansetron **OR** ondansetron, polyethylene glycol     Results for orders placed or performed during the hospital encounter of 12/12/24 (from the past 24 hours)   CBC and Auto Differential   Result Value Ref Range    WBC 11.7 (H) 4.4 - 11.3 x10*3/uL    nRBC 0.0 0.0 - 0.0 /100 WBCs    RBC 5.01 4.50 - 5.90 x10*6/uL    Hemoglobin 15.9 13.5 - 17.5 g/dL    Hematocrit 45.9 41.0 - 52.0 %    MCV 92 80 - 100 fL    MCH 31.7 26.0 - 34.0 pg    MCHC 34.6 " 32.0 - 36.0 g/dL    RDW 12.7 11.5 - 14.5 %    Platelets 239 150 - 450 x10*3/uL    Neutrophils % 91.4 40.0 - 80.0 %    Immature Granulocytes %, Automated 0.3 0.0 - 0.9 %    Lymphocytes % 3.5 13.0 - 44.0 %    Monocytes % 4.3 2.0 - 10.0 %    Eosinophils % 0.3 0.0 - 6.0 %    Basophils % 0.2 0.0 - 2.0 %    Neutrophils Absolute 10.71 (H) 1.60 - 5.50 x10*3/uL    Immature Granulocytes Absolute, Automated 0.04 0.00 - 0.50 x10*3/uL    Lymphocytes Absolute 0.41 (L) 0.80 - 3.00 x10*3/uL    Monocytes Absolute 0.50 0.05 - 0.80 x10*3/uL    Eosinophils Absolute 0.03 0.00 - 0.40 x10*3/uL    Basophils Absolute 0.02 0.00 - 0.10 x10*3/uL   Comprehensive Metabolic Panel   Result Value Ref Range    Glucose 139 (H) 74 - 99 mg/dL    Sodium 137 136 - 145 mmol/L    Potassium 4.1 3.5 - 5.3 mmol/L    Chloride 101 98 - 107 mmol/L    Bicarbonate 27 21 - 32 mmol/L    Anion Gap 13 10 - 20 mmol/L    Urea Nitrogen 22 6 - 23 mg/dL    Creatinine 1.51 (H) 0.50 - 1.30 mg/dL    eGFR 48 (L) >60 mL/min/1.73m*2    Calcium 9.5 8.6 - 10.3 mg/dL    Albumin 4.2 3.4 - 5.0 g/dL    Alkaline Phosphatase 107 33 - 136 U/L    Total Protein 7.3 6.4 - 8.2 g/dL    AST 12 9 - 39 U/L    Bilirubin, Total 0.9 0.0 - 1.2 mg/dL    ALT 15 10 - 52 U/L   Lactate   Result Value Ref Range    Lactate 3.0 (H) 0.4 - 2.0 mmol/L   Blood Culture    Specimen: Peripheral Venipuncture; Blood culture   Result Value Ref Range    Blood Culture Loaded on Instrument - Culture in progress    Troponin I, High Sensitivity   Result Value Ref Range    Troponin I, High Sensitivity 4 0 - 20 ng/L   Blood Gas Venous Full Panel   Result Value Ref Range    POCT pH, Venous 7.38 7.33 - 7.43 pH    POCT pCO2, Venous 48 41 - 51 mm Hg    POCT pO2, Venous 24 (L) 35 - 45 mm Hg    POCT SO2, Venous 28 (L) 45 - 75 %    POCT Oxy Hemoglobin, Venous 27.9 (L) 45.0 - 75.0 %    POCT Hematocrit Calculated, Venous 41.0 41.0 - 52.0 %    POCT Sodium, Venous 135 (L) 136 - 145 mmol/L    POCT Potassium, Venous 4.3 3.5 - 5.3 mmol/L     POCT Chloride, Venous 99 98 - 107 mmol/L    POCT Ionized Calicum, Venous 1.20 1.10 - 1.33 mmol/L    POCT Glucose, Venous 149 (H) 74 - 99 mg/dL    POCT Lactate, Venous 3.2 (H) 0.4 - 2.0 mmol/L    POCT Base Excess, Venous 2.5 -2.0 - 3.0 mmol/L    POCT HCO3 Calculated, Venous 28.4 (H) 22.0 - 26.0 mmol/L    POCT Hemoglobin, Venous 13.7 13.5 - 17.5 g/dL    POCT Anion Gap, Venous 12.0 10.0 - 25.0 mmol/L    Patient Temperature      FiO2 21 %   Electrocardiogram, 12-lead ACS symptoms   Result Value Ref Range    Ventricular Rate 97 BPM    Atrial Rate 97 BPM    MD Interval 136 ms    QRS Duration 94 ms    QT Interval 354 ms    QTC Calculation(Bazett) 449 ms    P Axis 24 degrees    R Axis -6 degrees    T Axis 70 degrees    QRS Count 15 beats    Q Onset 221 ms    P Onset 153 ms    P Offset 189 ms    T Offset 398 ms    QTC Fredericia 415 ms   Blood Culture    Specimen: Peripheral Venipuncture; Blood culture   Result Value Ref Range    Blood Culture Loaded on Instrument - Culture in progress    Sars-CoV-2 PCR   Result Value Ref Range    Coronavirus 2019, PCR Not Detected Not Detected   Urinalysis with Reflex Culture and Microscopic   Result Value Ref Range    Color, Urine Yellow Light-Yellow, Yellow, Dark-Yellow    Appearance, Urine Turbid (N) Clear    Specific Gravity, Urine 1.029 1.005 - 1.035    pH, Urine 6.5 5.0, 5.5, 6.0, 6.5, 7.0, 7.5, 8.0    Protein, Urine 30 (1+) (A) NEGATIVE, 10 (TRACE), 20 (TRACE) mg/dL    Glucose, Urine Normal Normal mg/dL    Blood, Urine NEGATIVE NEGATIVE    Ketones, Urine NEGATIVE NEGATIVE mg/dL    Bilirubin, Urine NEGATIVE NEGATIVE    Urobilinogen, Urine 2 (1+) (A) Normal mg/dL    Nitrite, Urine NEGATIVE NEGATIVE    Leukocyte Esterase, Urine NEGATIVE NEGATIVE   Urinalysis Microscopic   Result Value Ref Range    WBC, Urine 1-5 1-5, NONE /HPF    RBC, Urine 6-10 (A) NONE, 1-2, 3-5 /HPF    Squamous Epithelial Cells, Urine 1-9 (SPARSE) Reference range not established. /HPF    Mucus, Urine FEW Reference  range not established. /LPF    Hyaline Casts, Urine OCCASIONAL (A) NONE /LPF    Calcium Oxalate Crystals, Urine 1+ NONE, 1+ /HPF   Blood Gas Lactic Acid, Venous   Result Value Ref Range    POCT Lactate, Venous 0.4 0.4 - 2.0 mmol/L   MRSA Surveillance for Vancomycin De-escalation, PCR    Specimen: Anterior Nares; Swab   Result Value Ref Range    MRSA PCR Not Detected Not Detected   Lactate   Result Value Ref Range    Lactate 1.6 0.4 - 2.0 mmol/L         CT chest abdomen pelvis w IV contrast    Result Date: 12/12/2024  Interpreted By:  Natanael Akbar, STUDY: CT CHEST ABDOMEN PELVIS W IV CONTRAST;  12/12/2024 7:43 pm   INDICATION: Signs/Symptoms:Passed out nausea vomiting diarrhea.     COMPARISON: None.   ACCESSION NUMBER(S): [id number]   ORDERING CLINICIAN: MIGUEL DEJESUS   TECHNIQUE: Contiguous axial images of the chest, abdomen, and pelvis were obtained after the intravenous administration of iodinated contrast. Coronal and sagittal reformatted images were reconstructed from the axial data.   FINDINGS: CT CHEST:   MEDIASTINUM AND LYMPH NODES: Calcified right hilar, paratracheal and subcarinal lymph nodes consistent with remote granulomatous infection. There also numerous noncalcified mediastinal lymph nodes; for example, 1.2 cm aortopulmonary window lymph node, 1.2 cm right paratracheal lymph node, 1.5 cm subcarinal lymph node, 1.1 cm left lower paratracheal lymph node. No enlarged axillary lymph nodes. No pneumomediastinum.   VESSELS:  Normal caliber thoracic aorta without acute dissection. Moderate aortic atherosclerosis. The main pulmonary artery is normal in size.   HEART: Normal size.  Moderate coronary artery calcifications. No significant pericardial effusion.   LUNG, AIRWAYS, PLEURA: Extensive respiratory motion artifact limits assessment of fine parenchymal detail. That said, there is consolidation in the dependent aspects of the lower lobes highly suspicious for aspiration pneumonia/pneumonitis. No  pleural effusions or pneumothorax. Calcified left upper lobe granuloma, right lower lobe granuloma. No consolidation, pulmonary edema, pleural effusion or pneumothorax.   CHEST WALL SOFT TISSUES: No discernible acute abnormality.   OSSEOUS STRUCTURES: No acute osseous abnormality.     CT ABDOMEN/PELVIS:   ABDOMINAL WALL: No significant abnormality.   LIVER: No suspicious lesion. There are multiple too-small-to-characterize hypodensities in the liver statistically representing benign cysts.   BILE DUCTS: No significant intrahepatic or extrahepatic dilatation.   GALLBLADDER: Cholelithiasis. No discernible gallbladder wall thickening, pericholecystic fluid, or stranding.   PANCREAS: No significant abnormality.   SPLEEN: Calcified granulomas. No acute abnormality.   ADRENALS: No significant abnormality.   KIDNEYS, URETERS, BLADDER: Simple bilateral renal cysts measuring up to 1.1 cm on the right and 4.1 cm on the left. The right kidney is mildly atrophic. No calculi or hydronephrosis. The urinary bladder wall thickness appears within normal limits for degree of distention.   REPRODUCTIVE ORGANS: No significant abnormality.   VESSELS: Borderline aneurysmal dilatation of the aorta measuring 3 cm x 3 cm with moderate background atherosclerosis of the aorta and the iliac vessels.   RETROPERITONEUM/LYMPH NODES: No acute retroperitoneal abnormality. No adenopathy.   BOWEL/MESENTERY/PERITONEUM: There is mild wall thickening of the rectosigmoid colon and descending colon with subtle stranding. The small bowel is noninflamed. There is mild colonic diverticulosis. There is no bowel obstruction. Normal appendix.   No ascites, free air, or fluid collection.     MUSCULOSKELETAL: No acute osseous abnormality. No suspicious osseous lesion.       CT CHEST: Aspiration pneumonia/pneumonitis extensively throughout the lower lobes.   Chronically reactive enlarged mediastinal lymph nodes stable from prior study in addition to multiple  calcified nodes reflecting remote granulomatous infection.     CT ABDOMEN/PELVIS: Mild wall thickening of the descending or sigmoid colon concerning for mild colitis.   Borderline aneurysmal dilatation of the aorta measuring 3 cm x 3 cm.   Cholelithiasis.   MACRO: None.   Signed by: Natanael Akbar 12/12/2024 8:30 PM Dictation workstation:   [id number]    CT head wo IV contrast    Result Date: 12/12/2024  Interpreted By:  Lalo Gooden, STUDY: CT HEAD WO IV CONTRAST; 12/12/2024 7:30 pm   INDICATION: Signs/Symptoms:syncope.   COMPARISON: CT head dated 06/26/2024   ACCESSION NUMBER(S): [id number]   ORDERING CLINICIAN: MIGUEL DEJESUS   TECHNIQUE: Contiguous axial CT images were obtained through the head at 5 mm slice thickness without contrast administration.   FINDINGS: INTRACRANIAL: A large area of encephalomalacia is seen involving the right temporal lobe, occipital lobe and posterior parietal lobe, similar to the prior study, consistent with remote infarct. Mild diffuse volume loss is seen bilaterally. Mild-to-moderate subcortical and periventricular white matter changes are seen.  The grey-white differentiation is otherwise intact. There is no mass effect or midline shift. There is no extraaxial fluid collection. There is no intracranial hemorrhage. The calvarium  is unremarkable.   EXTRACRANIAL: Visualized paranasal sinuses and mastoids are clear.       1. Encephalomalacia in the right cerebral hemisphere, consistent with prior infarct. 2. Diffuse volume loss and periventricular white matter changes, most consistent with small vessel ischemic disease. 3. No evidence of acute cortical infarct or intracranial hemorrhage.     MACRO: None   Signed by: Lalo Gooden 12/12/2024 7:50 PM Dictation workstation:   [id number]    Electrocardiogram, 12-lead ACS symptoms    Result Date: 12/12/2024  Normal sinus rhythm Normal ECG When compared with ECG of 28-JUN-2024 07:05, No significant change was found See ED provider  note for full interpretation and clinical correlation    XR chest 1 view    Result Date: 12/12/2024  Interpreted By:  Benedict Willingham, STUDY: XR CHEST 1 VIEW;  12/12/2024 6:12 pm   INDICATION: Signs/Symptoms:syncope.     COMPARISON: None.   ACCESSION NUMBER(S): [id number]   ORDERING CLINICIAN: MIGUEL DEJESUS   FINDINGS:         CARDIOMEDIASTINAL SILHOUETTE: Cardiomediastinal silhouette is normal in size and configuration.   LUNGS: Increased perihilar lung markings present bilaterally with patchy airspace disease at the bases. There is no effusion   ABDOMEN: No remarkable upper abdominal findings.   BONES: No acute osseous changes.       1.  Findings suggestive of vascular congestion/mild edema.       MACRO: None   Signed by: Benedict Willingham 12/12/2024 6:24 PM Dictation workstation:   [id number]          Assessment/Plan   Assessment & Plan  Syncope and collapse    Acute colitis    Multifocal pneumonia    Acute hypoxemic respiratory failure (Multi)    Sepsis with acute renal failure and septic shock (Multi)    Acute kidney injury (CMS-HCC)      -Continue with IV antibiotics Zosyn and doxycycline.  -Continue with IV fluid hydration especially with acute kidney injury and hypotension.  -Blood pressure is stabilizing.  -Lactate normalized.  Clinically patient feels significantly better even though blood pressure still borderline but normal MAP.  -No need for vasopressor.  -Telemetry monitoring.  -Nasal cannula oxygen on 2 L but he is feeling more comfortable now will try to wean off to room air.  -Patient does have acute colitis.  Will be covered with Zosyn anyways.  -Check Legionella urine antigen, stool PCR and C. difficile.  -Also check COVID and influenza.  -Monitor serum electrolytes and correct as indicated      Cee Obrien MD

## 2024-12-13 NOTE — PROGRESS NOTES
Occupational Therapy    Evaluation    Patient Name: Lukas Shen  MRN: [id number]  Today's Date: 12/13/2024  Time Calculation  Start Time: 1431  Stop Time: 1445  Time Calculation (min): 14 min  1105/1105-A    Assessment  IP OT Assessment  OT Assessment: Decreased balance and strength impact pt's ability to complete ADLs IND  Prognosis: Good  Barriers to Discharge Home: No anticipated barriers  Evaluation/Treatment Tolerance: Patient tolerated treatment well  Medical Staff Made Aware: Yes  End of Session Communication: Bedside nurse  End of Session Patient Position: Bed, 3 rail up, Alarm on    Plan:  Treatment Interventions: ADL retraining, Functional transfer training, UE strengthening/ROM, Patient/family training  OT Frequency: 2 times per week  OT Discharge Recommendations: Low intensity level of continued care  Equipment Recommended upon Discharge:  (Pt owns FWW/cane, no other equipment recommenderd at this time)  OT Recommended Transfer Status: Assist of 1 (CGA)  OT - OK to Discharge: Yes (ok to d/c to next level of care once medically cleared)    Subjective     Current Problem:  1. Syncope and collapse        2. Nausea vomiting and diarrhea        3. Aspiration pneumonia of both lower lobes, unspecified aspiration pneumonia type (Multi)            General:  General  Reason for Referral: ADL impairment  Referred By: PT/OT 12/12 Alfredo  Past Medical History Relevant to Rehab: CVA with L sided weakness, depression, cardiac cath, thrombectomy, carotid stenosis,  Family/Caregiver Present: No  Co-Treatment: PT  Co-Treatment Reason: maximize pt function and safety  Prior to Session Communication: Bedside nurse  Patient Position Received:  (seated EOB, NA present)  General Comment: Pt is a 75 y/o M who presented to ED on 12/12 with syncope on commode, N/V/D x a few days and SOB. CAP CT: (+) colitis, Aspiration pneumonia/pneumonitis extensively throughout the lower lobes. Head CT: (-) acute, R cerebral encephalomacia  from prior infarct. CXR: vascular congestion. (IV, Tele)    Precautions:  Medical Precautions: Fall precautions    Vital Signs:  SpO2: 94 %    Pain:  Pain Assessment  Pain Assessment: 0-10  0-10 (Numeric) Pain Score: 0 - No pain    Objective     Cognition:  Overall Cognitive Status: Within Functional Limits  Orientation Level: Oriented X4             Home Living:  Home Living Comments: Pt reports living at home with his wife and dog in 1-level home with 0 FILI. Has a tub shower with seat.     Prior Function:  Prior Function Comments: Pt reports completing most ADLs IND (occasional assist with LB dressing and SUP showering from wife), wife completes iADLs. Pt reports ambulating MOD I using FWW or SPC. Uses SPC mostly in bathroom. 1 fall in the past 3 months, does not drive.    ADL:  Eating Assistance:  (set-up)  Grooming Assistance: Minimal  Bathing Assistance: Minimal  UE Dressing Assistance: Minimal  LE Dressing Assistance: Moderate  Toileting Assistance with Device: Minimal    Activity Tolerance:  Endurance: Decreased tolerance for upright activites    Bed Mobility/Transfers:   Bed Mobility  Bed Mobility:  (Transition from sit EOB to supine with CGA.)  Transfers  Transfer: Yes (Sit to/from stand using FWW with CGA, cues for hand placement.)    Ambulation/Gait Training:  Functional Mobility  Functional Mobility Performed: Yes (Pt completed functional mobility throughout room and hallway mod household distances using FWW with CGA.)    Sitting Balance:  Static Sitting Balance  Static Sitting-Comment/Number of Minutes: good    Standing Balance:  Static Standing Balance  Static Standing-Comment/Number of Minutes: fair  Dynamic Standing Balance  Dynamic Standing-Comments: fair    Strength:  Strength Comments: LUE strength grossly 4/5, RUE strength grossly 5/5    Hand Function:  Hand Function  Gross Grasp: Functional    Extremities: RUE   RUE : Within Functional Limits and LUE   LUE: Exceptions to WFL (90* shoulder  flexion AROM/PROM, full elbow/wrist AROM, 5th digit contracture in flexed position)    Outcome Measures: [facility] Daily Activity  Putting on and taking off regular lower body clothing: A lot  Bathing (including washing, rinsing, drying): A little  Putting on and taking off regular upper body clothing: A little  Toileting, which includes using toilet, bedpan or urinal: A little  Taking care of personal grooming such as brushing teeth: A little  Eating Meals: A little  Daily Activity - Total Score: 17                       EDUCATION:  Education  Individual(s) Educated: Patient  Education Comment: FWW management  Education Documentation  ADL Training, taught by Donna Calvert OT at 12/13/2024  3:24 PM.  Learner: Patient  Readiness: Acceptance  Method: Explanation, Demonstration  Response: Verbalizes Understanding, Demonstrated Understanding, Needs Reinforcement    Body Mechanics, taught by Donna Calvert OT at 12/13/2024  3:24 PM.  Learner: Patient  Readiness: Acceptance  Method: Explanation, Demonstration  Response: Verbalizes Understanding, Demonstrated Understanding, Needs Reinforcement    Goals:   Encounter Problems       Encounter Problems (Active)       OT Goals       Pt will complete all functional mobility with MOD I. (Progressing)       Start:  12/13/24    Expected End:  12/27/24            Pt will complete all functional transfers with MOD I. (Progressing)       Start:  12/13/24    Expected End:  12/27/24            Pt will complete all toileting tasks with MOD I. (Progressing)       Start:  12/13/24    Expected End:  12/27/24            Pt will improve dynamic standing balance to good- during ADL tasks. (Progressing)       Start:  12/13/24    Expected End:  12/27/24

## 2024-12-13 NOTE — PROGRESS NOTES
ASSESSMENT & PLAN:     Syncope and collapse     Acute colitis     Multifocal pneumonia     Acute hypoxemic respiratory failure (Multi)     Sepsis with acute renal failure and septic shock (Multi)     Acute kidney injury (CMS-HCC)        -Continue with IV antibiotics Zosyn and doxycycline.  -Continue with IV fluid hydration especially with acute kidney injury and hypotension.  -Blood pressure is stabilizing.  -Lactate normalized.  Clinically patient feels significantly better even though blood pressure still borderline but normal MAP.  -No need for vasopressor.  -Telemetry monitoring.  -Nasal cannula oxygen on 2 L but he is feeling more comfortable now will try to wean off to room air.  -Patient does have acute colitis.  Will be covered with Zosyn anyways.  -Check Legionella urine antigen, stool PCR and C. difficile.  -Also check COVID and influenza.  -Monitor serum electrolytes and correct as indicated    12/13/24  -suspect syncopal episode was due to hypotension from sepsis  -clinically doing better. HDS. Tmax 100.2F overnight. Still on 2L O2.   -wean O2 as tolerated  -cont empiric zosyn, doxy for today. COVID/Flu neg. MRSA PCR nares neg. Check procal. Fu bcx.   -cont IVF for today, encourage PO intake as well  -SLP eval for possible aspiration  -AURA resolved  -clinically does not have colitis, no abd, no diarrhea  -pt/ot eval    Kishor Fuentes MD    SUBJECTIVE     NAEON. Feeling much better. Denies acute complaints. No diarrhea, abd pain. No CP, dyspnea, cough, sputum.       OBJECTIVE:       Last Recorded Vitals:  Vitals:    12/12/24 2345 12/13/24 0045 12/13/24 0114 12/13/24 0728   BP: 98/52 108/54 92/55 113/58   BP Location: Right arm Right arm     Patient Position: Lying Lying     Pulse: 86 80 82 74   Resp: 16 16     Temp:   37.9 °C (100.2 °F) 36.7 °C (98.1 °F)   TempSrc:       SpO2: 96% 96% 93% 96%   Weight:       Height:           Last I/O:  I/O last 3 completed shifts:  In: 132 (1.6 mL/kg) [I.V.:132 (1.6  mL/kg)]  Out: 800 (10 mL/kg) [Urine:800 (0.3 mL/kg/hr)]  Weight: 80.3 kg     Physical Exam:  GEN: healthy appearing, appears stated age, NAD  CV: RRR, no m/r/g, no LE edema  LUNGS: no acute resp distress, scant crackles, on o2 via nc  ABD: soft, NT, ND, NBS  SKIN: no rashes  MSK; no gross deformities, normal joints  NEURO: A+Ox3, no FND  PSYCH: appropriate mood, affect      Inpatient Medications:  doxycycline, 100 mg, intravenous, q12h  enoxaparin, 40 mg, subcutaneous, q24h  pantoprazole, 40 mg, oral, Daily  piperacillin-tazobactam, 3.375 g, intravenous, q8h        PRN Medications  PRN medications: acetaminophen **OR** acetaminophen **OR** acetaminophen, melatonin, ondansetron **OR** ondansetron, polyethylene glycol    Continuous Medications:  lactated Ringer's, 100 mL/hr, Last Rate: 100 mL/hr (12/13/24 0305)          LABS AND IMAGING:     Labs:  Results for orders placed or performed during the hospital encounter of 12/12/24 (from the past 24 hours)   CBC and Auto Differential   Result Value Ref Range    WBC 11.7 (H) 4.4 - 11.3 x10*3/uL    nRBC 0.0 0.0 - 0.0 /100 WBCs    RBC 5.01 4.50 - 5.90 x10*6/uL    Hemoglobin 15.9 13.5 - 17.5 g/dL    Hematocrit 45.9 41.0 - 52.0 %    MCV 92 80 - 100 fL    MCH 31.7 26.0 - 34.0 pg    MCHC 34.6 32.0 - 36.0 g/dL    RDW 12.7 11.5 - 14.5 %    Platelets 239 150 - 450 x10*3/uL    Neutrophils % 91.4 40.0 - 80.0 %    Immature Granulocytes %, Automated 0.3 0.0 - 0.9 %    Lymphocytes % 3.5 13.0 - 44.0 %    Monocytes % 4.3 2.0 - 10.0 %    Eosinophils % 0.3 0.0 - 6.0 %    Basophils % 0.2 0.0 - 2.0 %    Neutrophils Absolute 10.71 (H) 1.60 - 5.50 x10*3/uL    Immature Granulocytes Absolute, Automated 0.04 0.00 - 0.50 x10*3/uL    Lymphocytes Absolute 0.41 (L) 0.80 - 3.00 x10*3/uL    Monocytes Absolute 0.50 0.05 - 0.80 x10*3/uL    Eosinophils Absolute 0.03 0.00 - 0.40 x10*3/uL    Basophils Absolute 0.02 0.00 - 0.10 x10*3/uL   Comprehensive Metabolic Panel   Result Value Ref Range    Glucose 139  (H) 74 - 99 mg/dL    Sodium 137 136 - 145 mmol/L    Potassium 4.1 3.5 - 5.3 mmol/L    Chloride 101 98 - 107 mmol/L    Bicarbonate 27 21 - 32 mmol/L    Anion Gap 13 10 - 20 mmol/L    Urea Nitrogen 22 6 - 23 mg/dL    Creatinine 1.51 (H) 0.50 - 1.30 mg/dL    eGFR 48 (L) >60 mL/min/1.73m*2    Calcium 9.5 8.6 - 10.3 mg/dL    Albumin 4.2 3.4 - 5.0 g/dL    Alkaline Phosphatase 107 33 - 136 U/L    Total Protein 7.3 6.4 - 8.2 g/dL    AST 12 9 - 39 U/L    Bilirubin, Total 0.9 0.0 - 1.2 mg/dL    ALT 15 10 - 52 U/L   Lactate   Result Value Ref Range    Lactate 3.0 (H) 0.4 - 2.0 mmol/L   Blood Culture    Specimen: Peripheral Venipuncture; Blood culture   Result Value Ref Range    Blood Culture Loaded on Instrument - Culture in progress    Troponin I, High Sensitivity   Result Value Ref Range    Troponin I, High Sensitivity 4 0 - 20 ng/L   Blood Gas Venous Full Panel   Result Value Ref Range    POCT pH, Venous 7.38 7.33 - 7.43 pH    POCT pCO2, Venous 48 41 - 51 mm Hg    POCT pO2, Venous 24 (L) 35 - 45 mm Hg    POCT SO2, Venous 28 (L) 45 - 75 %    POCT Oxy Hemoglobin, Venous 27.9 (L) 45.0 - 75.0 %    POCT Hematocrit Calculated, Venous 41.0 41.0 - 52.0 %    POCT Sodium, Venous 135 (L) 136 - 145 mmol/L    POCT Potassium, Venous 4.3 3.5 - 5.3 mmol/L    POCT Chloride, Venous 99 98 - 107 mmol/L    POCT Ionized Calicum, Venous 1.20 1.10 - 1.33 mmol/L    POCT Glucose, Venous 149 (H) 74 - 99 mg/dL    POCT Lactate, Venous 3.2 (H) 0.4 - 2.0 mmol/L    POCT Base Excess, Venous 2.5 -2.0 - 3.0 mmol/L    POCT HCO3 Calculated, Venous 28.4 (H) 22.0 - 26.0 mmol/L    POCT Hemoglobin, Venous 13.7 13.5 - 17.5 g/dL    POCT Anion Gap, Venous 12.0 10.0 - 25.0 mmol/L    Patient Temperature      FiO2 21 %   Electrocardiogram, 12-lead ACS symptoms   Result Value Ref Range    Ventricular Rate 97 BPM    Atrial Rate 97 BPM    AZ Interval 136 ms    QRS Duration 94 ms    QT Interval 354 ms    QTC Calculation(Bazett) 449 ms    P Axis 24 degrees    R Axis -6  degrees    T Axis 70 degrees    QRS Count 15 beats    Q Onset 221 ms    P Onset 153 ms    P Offset 189 ms    T Offset 398 ms    QTC Fredericia 415 ms   Blood Culture    Specimen: Peripheral Venipuncture; Blood culture   Result Value Ref Range    Blood Culture Loaded on Instrument - Culture in progress    Sars-CoV-2 PCR   Result Value Ref Range    Coronavirus 2019, PCR Not Detected Not Detected   Influenza A, and B PCR   Result Value Ref Range    Flu A Result Not Detected Not Detected    Flu B Result Not Detected Not Detected   Urinalysis with Reflex Culture and Microscopic   Result Value Ref Range    Color, Urine Yellow Light-Yellow, Yellow, Dark-Yellow    Appearance, Urine Turbid (N) Clear    Specific Gravity, Urine 1.029 1.005 - 1.035    pH, Urine 6.5 5.0, 5.5, 6.0, 6.5, 7.0, 7.5, 8.0    Protein, Urine 30 (1+) (A) NEGATIVE, 10 (TRACE), 20 (TRACE) mg/dL    Glucose, Urine Normal Normal mg/dL    Blood, Urine NEGATIVE NEGATIVE    Ketones, Urine NEGATIVE NEGATIVE mg/dL    Bilirubin, Urine NEGATIVE NEGATIVE    Urobilinogen, Urine 2 (1+) (A) Normal mg/dL    Nitrite, Urine NEGATIVE NEGATIVE    Leukocyte Esterase, Urine NEGATIVE NEGATIVE   Extra Urine Gray Tube   Result Value Ref Range    Extra Tube Hold for add-ons.    Urinalysis Microscopic   Result Value Ref Range    WBC, Urine 1-5 1-5, NONE /HPF    RBC, Urine 6-10 (A) NONE, 1-2, 3-5 /HPF    Squamous Epithelial Cells, Urine 1-9 (SPARSE) Reference range not established. /HPF    Mucus, Urine FEW Reference range not established. /LPF    Hyaline Casts, Urine OCCASIONAL (A) NONE /LPF    Calcium Oxalate Crystals, Urine 1+ NONE, 1+ /HPF   Blood Gas Lactic Acid, Venous   Result Value Ref Range    POCT Lactate, Venous 0.4 0.4 - 2.0 mmol/L   MRSA Surveillance for Vancomycin De-escalation, PCR    Specimen: Anterior Nares; Swab   Result Value Ref Range    MRSA PCR Not Detected Not Detected   Lactate   Result Value Ref Range    Lactate 1.6 0.4 - 2.0 mmol/L   SST TOP   Result Value  Ref Range    Extra Tube Hold for add-ons.    CBC   Result Value Ref Range    WBC 13.0 (H) 4.4 - 11.3 x10*3/uL    nRBC 0.0 0.0 - 0.0 /100 WBCs    RBC 3.92 (L) 4.50 - 5.90 x10*6/uL    Hemoglobin 12.2 (L) 13.5 - 17.5 g/dL    Hematocrit 35.8 (L) 41.0 - 52.0 %    MCV 91 80 - 100 fL    MCH 31.1 26.0 - 34.0 pg    MCHC 34.1 32.0 - 36.0 g/dL    RDW 12.9 11.5 - 14.5 %    Platelets 180 150 - 450 x10*3/uL   Basic metabolic panel   Result Value Ref Range    Glucose 144 (H) 74 - 99 mg/dL    Sodium 139 136 - 145 mmol/L    Potassium 3.8 3.5 - 5.3 mmol/L    Chloride 111 (H) 98 - 107 mmol/L    Bicarbonate 23 21 - 32 mmol/L    Anion Gap 9 (L) 10 - 20 mmol/L    Urea Nitrogen 18 6 - 23 mg/dL    Creatinine 1.24 0.50 - 1.30 mg/dL    eGFR 61 >60 mL/min/1.73m*2    Calcium 7.9 (L) 8.6 - 10.3 mg/dL   POCT GLUCOSE   Result Value Ref Range    POCT Glucose 147 (H) 74 - 99 mg/dL        Imaging:  CT chest abdomen pelvis w IV contrast  Narrative: Interpreted By:  Natanael Akbar,   STUDY:  CT CHEST ABDOMEN PELVIS W IV CONTRAST;  12/12/2024 7:43 pm      INDICATION:  Signs/Symptoms:Passed out nausea vomiting diarrhea.          COMPARISON:  None.      ACCESSION NUMBER(S):  [id number]      ORDERING CLINICIAN:  MIGUEL DEJESUS      TECHNIQUE:  Contiguous axial images of the chest, abdomen, and pelvis were  obtained after the intravenous administration of iodinated contrast.  Coronal and sagittal reformatted images were reconstructed from the  axial data.      FINDINGS:  CT CHEST:      MEDIASTINUM AND LYMPH NODES: Calcified right hilar, paratracheal and  subcarinal lymph nodes consistent with remote granulomatous  infection. There also numerous noncalcified mediastinal lymph nodes;  for example, 1.2 cm aortopulmonary window lymph node, 1.2 cm right  paratracheal lymph node, 1.5 cm subcarinal lymph node, 1.1 cm left  lower paratracheal lymph node. No enlarged axillary lymph nodes. No  pneumomediastinum.      VESSELS:  Normal caliber thoracic aorta  without acute dissection.  Moderate aortic atherosclerosis. The main pulmonary artery is normal  in size.      HEART: Normal size.  Moderate coronary artery calcifications. No  significant pericardial effusion.      LUNG, AIRWAYS, PLEURA: Extensive respiratory motion artifact limits  assessment of fine parenchymal detail. That said, there is  consolidation in the dependent aspects of the lower lobes highly  suspicious for aspiration pneumonia/pneumonitis. No pleural effusions  or pneumothorax. Calcified left upper lobe granuloma, right lower  lobe granuloma. No consolidation, pulmonary edema, pleural effusion  or pneumothorax.      CHEST WALL SOFT TISSUES: No discernible acute abnormality.      OSSEOUS STRUCTURES: No acute osseous abnormality.          CT ABDOMEN/PELVIS:      ABDOMINAL WALL: No significant abnormality.      LIVER: No suspicious lesion. There are multiple  too-small-to-characterize hypodensities in the liver statistically  representing benign cysts.      BILE DUCTS: No significant intrahepatic or extrahepatic dilatation.      GALLBLADDER: Cholelithiasis. No discernible gallbladder wall  thickening, pericholecystic fluid, or stranding.      PANCREAS: No significant abnormality.      SPLEEN: Calcified granulomas. No acute abnormality.      ADRENALS: No significant abnormality.      KIDNEYS, URETERS, BLADDER: Simple bilateral renal cysts measuring up  to 1.1 cm on the right and 4.1 cm on the left. The right kidney is  mildly atrophic. No calculi or hydronephrosis. The urinary bladder  wall thickness appears within normal limits for degree of distention.      REPRODUCTIVE ORGANS: No significant abnormality.      VESSELS: Borderline aneurysmal dilatation of the aorta measuring 3 cm  x 3 cm with moderate background atherosclerosis of the aorta and the  iliac vessels.      RETROPERITONEUM/LYMPH NODES: No acute retroperitoneal abnormality. No  adenopathy.      BOWEL/MESENTERY/PERITONEUM: There is mild wall  thickening of the  rectosigmoid colon and descending colon with subtle stranding. The  small bowel is noninflamed. There is mild colonic diverticulosis.  There is no bowel obstruction. Normal appendix.      No ascites, free air, or fluid collection.          MUSCULOSKELETAL: No acute osseous abnormality. No suspicious osseous  lesion.      Impression: CT CHEST:  Aspiration pneumonia/pneumonitis extensively throughout the lower  lobes.      Chronically reactive enlarged mediastinal lymph nodes stable from  prior study in addition to multiple calcified nodes reflecting remote  granulomatous infection.          CT ABDOMEN/PELVIS:  Mild wall thickening of the descending or sigmoid colon concerning  for mild colitis.      Borderline aneurysmal dilatation of the aorta measuring 3 cm x 3 cm.      Cholelithiasis.      MACRO:  None.      Signed by: Natanael Akbar 12/12/2024 8:30 PM  Dictation workstation:   [id number]  CT head wo IV contrast  Narrative: Interpreted By:  Lalo Gooden,   STUDY:  CT HEAD WO IV CONTRAST; 12/12/2024 7:30 pm      INDICATION:  Signs/Symptoms:syncope.      COMPARISON:  CT head dated 06/26/2024      ACCESSION NUMBER(S):  [id number]      ORDERING CLINICIAN:  MIGUEL DEJESUS      TECHNIQUE:  Contiguous axial CT images were obtained through the head at 5 mm  slice thickness without contrast administration.      FINDINGS:  INTRACRANIAL:  A large area of encephalomalacia is seen involving the right temporal  lobe, occipital lobe and posterior parietal lobe, similar to the  prior study, consistent with remote infarct. Mild diffuse volume loss  is seen bilaterally. Mild-to-moderate subcortical and periventricular  white matter changes are seen.  The grey-white differentiation is  otherwise intact. There is no mass effect or midline shift. There is  no extraaxial fluid collection. There is no intracranial hemorrhage.  The calvarium  is unremarkable.      EXTRACRANIAL:  Visualized paranasal sinuses and  mastoids are clear.      Impression: 1. Encephalomalacia in the right cerebral hemisphere, consistent with  prior infarct.  2. Diffuse volume loss and periventricular white matter changes, most  consistent with small vessel ischemic disease.  3. No evidence of acute cortical infarct or intracranial hemorrhage.          MACRO:  None      Signed by: Lalo Gooden 12/12/2024 7:50 PM  Dictation workstation:   [id number]  Electrocardiogram, 12-lead ACS symptoms  Normal sinus rhythm  Normal ECG  When compared with ECG of 28-JUN-2024 07:05,  No significant change was found    See ED provider note for full interpretation and clinical correlation  XR chest 1 view  Narrative: Interpreted By:  Benedict Willingham,   STUDY:  XR CHEST 1 VIEW;  12/12/2024 6:12 pm      INDICATION:  Signs/Symptoms:syncope.          COMPARISON:  None.      ACCESSION NUMBER(S):  [id number]      ORDERING CLINICIAN:  MIGUEL DEJESUS      FINDINGS:                  CARDIOMEDIASTINAL SILHOUETTE:  Cardiomediastinal silhouette is normal in size and configuration.      LUNGS:  Increased perihilar lung markings present bilaterally with patchy  airspace disease at the bases. There is no effusion      ABDOMEN:  No remarkable upper abdominal findings.      BONES:  No acute osseous changes.      Impression: 1.  Findings suggestive of vascular congestion/mild edema.              MACRO:  None      Signed by: Benedict Willingham 12/12/2024 6:24 PM  Dictation workstation:   [id number]

## 2024-12-13 NOTE — PROGRESS NOTES
Physical Therapy    Physical Therapy Evaluation    Patient Name: Lukas Shen  MRN: [id number]  Today's Date: 12/13/2024   Time Calculation  Start Time: 1432  Stop Time: 1446  Time Calculation (min): 14 min  1105/1105-A    Assessment/Plan   PT Assessment  PT Assessment Results: Decreased strength, Decreased range of motion, Decreased endurance, Impaired balance, Decreased mobility, Decreased coordination  Rehab Prognosis: Good  Barriers to Discharge Home: No anticipated barriers  Evaluation/Treatment Tolerance: Patient limited by fatigue  Medical Staff Made Aware: Yes  Strengths: Support of Caregivers, Living arrangement secure, Housing layout  Barriers to Participation: Comorbidities  End of Session Communication: Bedside nurse  Assessment Comment: Recommend continued therapy in acute care followed by continued therapy at a low intensity level following D/C.  End of Session Patient Position: Bed, 3 rail up, Alarm on (Call light within reach)  IP OR SWING BED PT PLAN  Inpatient or Swing Bed: Inpatient  PT Plan  Treatment/Interventions: Bed mobility, Transfer training, Gait training, Balance training, Strengthening, Endurance training, Therapeutic exercise  PT Plan: Ongoing PT  PT Frequency: 2 times per week  PT Discharge Recommendations: Low intensity level of continued care  PT Recommended Transfer Status: Contact guard, Assistive device  Physical Therapy eval completed per MD requisition. P.T. recommendations as outlined above. Recommend D/C from acute care when medically appropriate as deemed by medical staff.    Subjective       General Visit Information:  General  Reason for Referral: impaired mobility  Referred By: PT/OT 12/12 Alfredo  Past Medical History Relevant to Rehab: includes: CVA with L sided weakness, depression, cardiac cath, thrombectomy, carotid stenosis,  Family/Caregiver Present: No  Co-Treatment: OT  Co-Treatment Reason: Pt. seen with OT to maximize safety and function  Prior to Session  Communication: Bedside nurse  Patient Position Received: Alarm off, not on at start of session (Seated on EOB)  Preferred Learning Style: auditory, verbal  General Comment: Pt is a 75yo who [place] [facility] [place] on 12/12/2024 following a syncopal episode while on commode, and c/o nausea, vomiting, diarrhea for a few days with SOB.   Imaging 12/12/2024:   Chest/ABD/Pelvic  CT: (+) colitis, BLL aspiration pneumonia/pneumonitis   Head CT: (-) acute findings, (+) R cerebral encephalomacia from prior infarct.   CXR: (+) vascular congestion.   Hgb (12/13) 12.2 trending down   Dx: syncope, nausea, vomiting, diarrhea, BLL aspiration pneumonia    Home Living:  Home Living  Home Living Comments: Pt. lived with spouse in a 1 level house with no FILI. Bed/bath on 1st floor with tub shower with a seat but no grab bars. Laundry on 1st floor.    Prior Level of Function:  Prior Function Per Pt/Caregiver Report  Prior Function Comments: Pt. amb with either a cane or a FWW PTA. Pt. stated he had A from wife for ADLs and wife completed IADLs PTA. Pt. denied any other falls in last 3 months. Pt. does not drive.    Precautions:  Precautions  Medical Precautions:  (Activity order: No resrictions)  Precautions Comment: Per EMR: High fall risk         Objective     Pain:  Pain Assessment  Pain Assessment: 0-10  0-10 (Numeric) Pain Score: 0 - No pain    Cognition:  Cognition  Overall Cognitive Status: Within Functional Limits  Orientation Level: Oriented X4    General Assessments:  General Observation  General Observation: IV, Purewick   Activity Tolerance  Endurance: Decreased tolerance for upright activites           Coordination  Movements are Fluid and Coordinated: No  Upper Body Coordination: Decreased coordination ad increased tone L UE     Dynamic Sitting Balance  Dynamic Sitting-Comments: Good static and dynamic sitting balance  Dynamic Standing Balance  Dynamic Standing-Comments: Fair+ static and dynamic standing balance    Functional  Assessments:     Bed Mobility  Bed Mobility: Yes  Bed Mobility 1  Bed Mobility 1: Sitting to supine  Bed Mobility Comments 1: A to lift LEs onto bed and to control descent/placement of trunk  Transfers  Transfer: Yes  Transfer 1  Technique 1: Sit to stand, Stand to sit  Transfer Device 1:  (FWW)  Transfer Level of Assistance 1: Contact guard  Trials/Comments 1: CGA for safety. Increased time and VC's to place and maintain grasp on FWW with L hand  Ambulation/Gait Training  Ambulation/Gait Training Performed: Yes  Ambulation/Gait Training 1  Surface 1: Level tile  Device 1: Rolling walker  Assistance 1: Contact guard  Quality of Gait 1: Narrow base of support (slow, step-to gait with shortened step lengths, decreased foot clearance L LE.)  Comments/Distance (ft) 1: 40'; CGA for safety.  Stairs  Stairs: No       Extremity/Trunk Assessments:  RUE   RUE : Within Functional Limits  LUE   LUE:  (A/PROM ~90° shoulder flexion  full elbow/wrist AROM, 5th digit contracture in flexed position; strength not assessed due to increased tone.)  RLE   RLE : Within Functional Limits  LLE   LLE :  (AROM WFL, strength 4/5)    Outcome Measures:     [facility] Basic Mobility  Turning from your back to your side while in a flat bed without using bedrails: A little  Moving from lying on your back to sitting on the side of a flat bed without using bedrails: A little  Moving to and from bed to chair (including a wheelchair): A little  Standing up from a chair using your arms (e.g. wheelchair or bedside chair): A little  To walk in hospital room: A little  Climbing 3-5 steps with railing: Total  Basic Mobility - Total Score: 16                                                             Goals:  Encounter Problems       Encounter Problems (Active)       PT Problem       Pt. will transfer supine/sit with MOD I (Progressing)       Start:  12/13/24            Pt. will transfer sit/stand with FWW with MOD I (Progressing)       Start:  12/13/24             Pt.will ambulate 50' with FWW with MOD I (Progressing)       Start:  12/13/24            Pt. will perform 2 x 15 B LE AROM exercises  (Not Progressing)       Start:  12/13/24                 Education Documentation  Mobility Training, taught by Oscar Bernal, PT at 12/13/2024  4:01 PM.  Learner: Patient  Readiness: Acceptance  Method: Explanation  Response: Verbalizes Understanding, Needs Reinforcement  Comment: Role of PT, transfers, amb, safety, PT POC

## 2024-12-13 NOTE — PROGRESS NOTES
Inpatient Speech-Language Pathology Clinical Swallow Evaluation       Patient Name: Lukas Shen  MRN: [id number]  : 1950  Today's Date: 24  Time Calculation  Start Time: 0840  Stop Time: 0900  Time Calculation (min): 20 min     Room:[address] [place] [address]     Assessment:     Consistencies Trialed: Consistencies Trialed: Thin (IDDSI Level 0) - Straw, Thin (IDDSI Level 0) - Cup, Regular (IDDSI Level 7), Pureed/extremely thick (IDDSI Level 4)   Oral Motor: Within Functional Limits  Lingual Strength: Within Functional Limits   Dentition: Edentulous         Assessment Results:   DYSPHAGIA EVALUATION: (Consistencies attempted: Puree, pancakes, thin liquids via cup and straws)    Oral Status: Patient was edentulous and stated that he had dentures at home but that they broke and his wife threw them away.  He stated that he eats regular foods without difficulty at home.  No asymmetry noted.  Patient had adequate lingual strength and range of motion.    Oral Phase: Patient's oral phase of the swallow characterized by mild oral delay with pancakes secondary to edentulous status.  Patient had good oral clearance post swallow with pancakes.    Pharyngeal Phase: Patient's pharyngeal phase of the swallow characterized by no overt signs symptoms aspiration seen during clinical swallow evaluation with food or liquid trials.      DIAGNOSTIC IMPRESSIONS:  Patient presents with a mild oral dysphagia secondary to edentulous status.  No overt signs symptoms aspiration seen during clinical swallow evaluation.  Unable to rule out possibility of silent aspiration.  Patient did have chest CT revealing aspiration pneumonia/pneumonitis extensively throughout the lower lobes.  Recommending patient continue with regular diet and thin liquids.  Speech therapy will continue to assess patient's ability to safely tolerate p.o. diet and determine if a modified barium swallow study is warranted.  Aspiration pneumonia may be related to his  nausea and vomiting.           Plan:     SLP Plan: Skilled SLP Skilled speech therapy for dysphagia treatment is warranted in order to provide training and instruction regarding the use of compensatory swallow strategies, oropharyngeal strengthening exercises, and pt/caregiver education in order to reduce risk of aspiration, dehydration and malnutrition.  SLP Frequency: Follow-up visit only   Duration: 1 week       Discussed POC: Patient, Nursing   Discussed Risks/Benefits: Yes, Patient, Nursing   Patient/Caregiver Agreeable: Yes   SLP Discharge Recommendations: unable to determine at this time, refer to subsequent notes     Subjective:      Patient was seen at bedside.  He required repositioning in his bed in an upright position.  Once patient put in an upright position and his tray was put in front of him and food was cut up he was able to feed himself independently.     General Visit Information:     Pt. Admitted patient admitted with nausea and diarrhea.  He also had shortness of breath.  CT revealed aspiration pneumonia/pneumonitis extensively throughout the lower lobes.  Past medical history: Patient has past medical history of depression and old CVA.    Living Environment: Home           Reason for Referral: Pneumonia      Current Diet : Regular with thin liquids   Prior to Session Communication: Bedside nurse      Pain:     Pain Assessment  Pain Assessment: 0-10  0-10 (Numeric) Pain Score: 0 - No pain      Baseline Assessment:     Respiratory Status: Oxygen via nasal cannula   Patient Positioning: Upright in Bed      Goals:     1.  Patient will be able to tolerate regular diet and thin liquids without overt signs symptoms aspiration or pulmonary compromise.  Start Date: 12/13/24  Progress: No signs symptoms aspiration during clinical swallow evaluation however patient does have CT that revealed aspiration pneumonia.  Status: Continue goal    2.  Patient will be able to use compensatory swallowing strategies  independently including sitting upright while eating, small bites/sips, and alternating bites/sips.  Also recommending that patient cut up his food into bite-size pieces due to his edentulous status.  Start Date: 12/13/24  Progress: Patient educated regarding use of compensatory swallowing strategies.  He may need cueing to remember to use compensatory swallowing strategies.  Status: Continue goal    *Additional goals to be determined if patient has modified barium swallow study completed.        In Patient Education:   Pt. educated on safe swallow strategies, role of SLP, S/S of aspiration to be aware of, risks of aspiration, current swallow function, recommended diet  Patient gave verbal understanding

## 2024-12-13 NOTE — PROGRESS NOTES
12/13/24 1558   Discharge Planning   Living Arrangements Spouse/significant other   Support Systems Spouse/significant other;Children   Type of Residence Private residence   Number of Stairs to Enter Residence 0   Number of Stairs Within Residence 0   Who is requesting discharge planning? Provider   Home or Post Acute Services In home services   Expected Discharge Disposition Home   Does the patient need discharge transport arranged? No   Patient Choice   Provider Choice list and CMS website (https://medicare.gov/care-compare#search) for post-acute Quality and Resource Measure Data were provided and reviewed with: Patient     Met with pt who reports plan for discharge home, lives with his wife. Wife provides transport. Has walker, cane & motorized chair at home. Denies dc needs.

## 2024-12-14 VITALS
TEMPERATURE: 99.3 F | OXYGEN SATURATION: 92 % | SYSTOLIC BLOOD PRESSURE: 137 MMHG | BODY MASS INDEX: 26.83 KG/M2 | HEART RATE: 75 BPM | HEIGHT: 68 IN | RESPIRATION RATE: 16 BRPM | DIASTOLIC BLOOD PRESSURE: 63 MMHG | WEIGHT: 177 LBS

## 2024-12-14 LAB
ANION GAP SERPL CALC-SCNC: 9 MMOL/L (ref 10–20)
BUN SERPL-MCNC: 14 MG/DL (ref 6–23)
C COLI+JEJ+UPSA DNA STL QL NAA+PROBE: NOT DETECTED
CALCIUM SERPL-MCNC: 8.1 MG/DL (ref 8.6–10.3)
CHLORIDE SERPL-SCNC: 112 MMOL/L (ref 98–107)
CO2 SERPL-SCNC: 24 MMOL/L (ref 21–32)
CREAT SERPL-MCNC: 1.32 MG/DL (ref 0.5–1.3)
EC STX1 GENE STL QL NAA+PROBE: NOT DETECTED
EC STX2 GENE STL QL NAA+PROBE: NOT DETECTED
EGFRCR SERPLBLD CKD-EPI 2021: 57 ML/MIN/1.73M*2
ERYTHROCYTE [DISTWIDTH] IN BLOOD BY AUTOMATED COUNT: 12.9 % (ref 11.5–14.5)
GLUCOSE SERPL-MCNC: 88 MG/DL (ref 74–99)
HCT VFR BLD AUTO: 34.3 % (ref 41–52)
HGB BLD-MCNC: 11.7 G/DL (ref 13.5–17.5)
HOLD SPECIMEN: NORMAL
MAGNESIUM SERPL-MCNC: 1.62 MG/DL (ref 1.6–2.4)
MCH RBC QN AUTO: 31.4 PG (ref 26–34)
MCHC RBC AUTO-ENTMCNC: 34.1 G/DL (ref 32–36)
MCV RBC AUTO: 92 FL (ref 80–100)
NOROVIRUS GI + GII RNA STL NAA+PROBE: NOT DETECTED
NRBC BLD-RTO: 0 /100 WBCS (ref 0–0)
PLATELET # BLD AUTO: 190 X10*3/UL (ref 150–450)
POTASSIUM SERPL-SCNC: 3.5 MMOL/L (ref 3.5–5.3)
RBC # BLD AUTO: 3.73 X10*6/UL (ref 4.5–5.9)
RV RNA STL NAA+PROBE: NOT DETECTED
SALMONELLA DNA STL QL NAA+PROBE: NOT DETECTED
SHIGELLA DNA SPEC QL NAA+PROBE: NOT DETECTED
SODIUM SERPL-SCNC: 141 MMOL/L (ref 136–145)
V CHOLERAE DNA STL QL NAA+PROBE: NOT DETECTED
WBC # BLD AUTO: 8.8 X10*3/UL (ref 4.4–11.3)
Y ENTEROCOL DNA STL QL NAA+PROBE: NOT DETECTED

## 2024-12-14 PROCEDURE — 80048 BASIC METABOLIC PNL TOTAL CA: CPT | Performed by: INTERNAL MEDICINE

## 2024-12-14 PROCEDURE — 2500000004 HC RX 250 GENERAL PHARMACY W/ HCPCS (ALT 636 FOR OP/ED): Performed by: INTERNAL MEDICINE

## 2024-12-14 PROCEDURE — 36415 COLL VENOUS BLD VENIPUNCTURE: CPT | Performed by: INTERNAL MEDICINE

## 2024-12-14 PROCEDURE — 85027 COMPLETE CBC AUTOMATED: CPT | Performed by: INTERNAL MEDICINE

## 2024-12-14 PROCEDURE — 99239 HOSP IP/OBS DSCHRG MGMT >30: CPT | Performed by: INTERNAL MEDICINE

## 2024-12-14 PROCEDURE — 87506 IADNA-DNA/RNA PROBE TQ 6-11: CPT | Mod: ELYLAB | Performed by: INTERNAL MEDICINE

## 2024-12-14 PROCEDURE — 83735 ASSAY OF MAGNESIUM: CPT | Performed by: INTERNAL MEDICINE

## 2024-12-14 PROCEDURE — 2500000001 HC RX 250 WO HCPCS SELF ADMINISTERED DRUGS (ALT 637 FOR MEDICARE OP): Performed by: INTERNAL MEDICINE

## 2024-12-14 RX ORDER — AMOXICILLIN AND CLAVULANATE POTASSIUM 875; 125 MG/1; MG/1
1 TABLET, FILM COATED ORAL 2 TIMES DAILY
Qty: 10 TABLET | Refills: 0 | Status: SHIPPED | OUTPATIENT
Start: 2024-12-14 | End: 2024-12-19

## 2024-12-14 RX ORDER — DOXYCYCLINE 100 MG/1
100 CAPSULE ORAL EVERY 12 HOURS SCHEDULED
Qty: 8 CAPSULE | Refills: 0 | Status: SHIPPED | OUTPATIENT
Start: 2024-12-14 | End: 2024-12-18

## 2024-12-14 SDOH — SOCIAL STABILITY: SOCIAL INSECURITY: WITHIN THE LAST YEAR, HAVE YOU BEEN HUMILIATED OR EMOTIONALLY ABUSED IN OTHER WAYS BY YOUR PARTNER OR EX-PARTNER?: NO

## 2024-12-14 SDOH — ECONOMIC STABILITY: FOOD INSECURITY: WITHIN THE PAST 12 MONTHS, THE FOOD YOU BOUGHT JUST DIDN'T LAST AND YOU DIDN'T HAVE MONEY TO GET MORE.: PATIENT DECLINED

## 2024-12-14 SDOH — SOCIAL STABILITY: SOCIAL INSECURITY: WITHIN THE LAST YEAR, HAVE YOU BEEN AFRAID OF YOUR PARTNER OR EX-PARTNER?: NO

## 2024-12-14 SDOH — SOCIAL STABILITY: SOCIAL NETWORK: HOW OFTEN DO YOU ATTEND MEETINGS OF THE CLUBS OR ORGANIZATIONS YOU BELONG TO?: PATIENT DECLINED

## 2024-12-14 SDOH — HEALTH STABILITY: PHYSICAL HEALTH: ON AVERAGE, HOW MANY DAYS PER WEEK DO YOU ENGAGE IN MODERATE TO STRENUOUS EXERCISE (LIKE A BRISK WALK)?: 0 DAYS

## 2024-12-14 SDOH — ECONOMIC STABILITY: HOUSING INSECURITY: IN THE LAST 12 MONTHS, WAS THERE A TIME WHEN YOU WERE NOT ABLE TO PAY THE MORTGAGE OR RENT ON TIME?: PATIENT DECLINED

## 2024-12-14 SDOH — SOCIAL STABILITY: SOCIAL NETWORK: IN A TYPICAL WEEK, HOW MANY TIMES DO YOU TALK ON THE PHONE WITH FAMILY, FRIENDS, OR NEIGHBORS?: PATIENT DECLINED

## 2024-12-14 SDOH — SOCIAL STABILITY: SOCIAL NETWORK: HOW OFTEN DO YOU ATTEND CHURCH OR RELIGIOUS SERVICES?: PATIENT DECLINED

## 2024-12-14 SDOH — ECONOMIC STABILITY: FOOD INSECURITY: HOW HARD IS IT FOR YOU TO PAY FOR THE VERY BASICS LIKE FOOD, HOUSING, MEDICAL CARE, AND HEATING?: PATIENT DECLINED

## 2024-12-14 SDOH — SOCIAL STABILITY: SOCIAL INSECURITY: ARE YOU MARRIED, WIDOWED, DIVORCED, SEPARATED, NEVER MARRIED, OR LIVING WITH A PARTNER?: PATIENT DECLINED

## 2024-12-14 SDOH — ECONOMIC STABILITY: HOUSING INSECURITY: IN THE PAST 12 MONTHS, HOW MANY TIMES HAVE YOU MOVED WHERE YOU WERE LIVING?: 0

## 2024-12-14 SDOH — SOCIAL STABILITY: SOCIAL NETWORK
DO YOU BELONG TO ANY CLUBS OR ORGANIZATIONS SUCH AS CHURCH GROUPS, UNIONS, FRATERNAL OR ATHLETIC GROUPS, OR SCHOOL GROUPS?: PATIENT DECLINED

## 2024-12-14 SDOH — SOCIAL STABILITY: SOCIAL NETWORK: HOW OFTEN DO YOU GET TOGETHER WITH FRIENDS OR RELATIVES?: PATIENT DECLINED

## 2024-12-14 SDOH — ECONOMIC STABILITY: HOUSING INSECURITY: AT ANY TIME IN THE PAST 12 MONTHS, WERE YOU HOMELESS OR LIVING IN A SHELTER (INCLUDING NOW)?: PATIENT DECLINED

## 2024-12-14 SDOH — HEALTH STABILITY: PHYSICAL HEALTH: ON AVERAGE, HOW MANY MINUTES DO YOU ENGAGE IN EXERCISE AT THIS LEVEL?: 0 MIN

## 2024-12-14 SDOH — ECONOMIC STABILITY: FOOD INSECURITY
WITHIN THE PAST 12 MONTHS, YOU WORRIED THAT YOUR FOOD WOULD RUN OUT BEFORE YOU GOT THE MONEY TO BUY MORE.: PATIENT DECLINED

## 2024-12-14 ASSESSMENT — COGNITIVE AND FUNCTIONAL STATUS - GENERAL
DRESSING REGULAR LOWER BODY CLOTHING: A LITTLE
CLIMB 3 TO 5 STEPS WITH RAILING: A LOT
HELP NEEDED FOR BATHING: A LITTLE
TURNING FROM BACK TO SIDE WHILE IN FLAT BAD: A LITTLE
MOVING FROM LYING ON BACK TO SITTING ON SIDE OF FLAT BED WITH BEDRAILS: A LITTLE
TOILETING: A LITTLE
MOBILITY SCORE: 17
TOILETING: A LITTLE
MOVING FROM LYING ON BACK TO SITTING ON SIDE OF FLAT BED WITH BEDRAILS: A LITTLE
WALKING IN HOSPITAL ROOM: A LITTLE
PERSONAL GROOMING: A LITTLE
DAILY ACTIVITIY SCORE: 18
WALKING IN HOSPITAL ROOM: A LITTLE
DAILY ACTIVITIY SCORE: 18
HELP NEEDED FOR BATHING: A LITTLE
DRESSING REGULAR UPPER BODY CLOTHING: A LITTLE
DRESSING REGULAR UPPER BODY CLOTHING: A LITTLE
STANDING UP FROM CHAIR USING ARMS: A LITTLE
TURNING FROM BACK TO SIDE WHILE IN FLAT BAD: A LITTLE
MOVING TO AND FROM BED TO CHAIR: A LITTLE
PERSONAL GROOMING: A LITTLE
MOVING TO AND FROM BED TO CHAIR: A LITTLE
EATING MEALS: A LITTLE
EATING MEALS: A LITTLE
DRESSING REGULAR LOWER BODY CLOTHING: A LITTLE
CLIMB 3 TO 5 STEPS WITH RAILING: A LOT

## 2024-12-14 ASSESSMENT — PAIN SCALES - GENERAL
PAINLEVEL_OUTOF10: 0 - NO PAIN
PAINLEVEL_OUTOF10: 0 - NO PAIN

## 2024-12-14 ASSESSMENT — ACTIVITIES OF DAILY LIVING (ADL): LACK_OF_TRANSPORTATION: PATIENT DECLINED

## 2024-12-14 NOTE — CARE PLAN
The patient's goals for the shift include Pt want to rest    The clinical goals for the shift include safety    Over the shift, the patient did not make progress toward the following goals. Barriers to progression include . Recommendations to address these barriers include .

## 2024-12-14 NOTE — DISCHARGE SUMMARY
DISCHARGE DIAGNOSIS     Syncope  Sepsis due to multifocal PNA  AURA  Acute hypoxemic respiratory failure  Colitis    HOSPITAL COURSE AND DETAILS     74M with PMH of depression, stroke who presented with n/v, diarrhea, dyspnea. He had multiple episodes of vomiting, cough, chills, myalgias, weakness x1 week. He was very weak at home just prior to coming in, blood pressure was low, was lightheaded, and fainted. Wife claled EMS. Here he was tachycardic, hypertensive, hypoxemic requiring 2L O2, had leukocytosis, lactic acid elevation, aura on ckd3. CT C/A/P showed multifocal PNA throughout lower lobes and possible colitis in descending and sigmoid colon. He was admitted for sepsis.     His syncopal episode was likely due to hypotension due to hypovolemia and sepsis. Pt improved with empiric broad spectrum abx and IVF. He was seen by SLP at bedside, low suspicion for aspiration. Might have occurred during previous n/v episodes. He had no further n/v, diarrhea during hospital stay, was tolerating diet. He was weaned to room air. AURA resolved with IVF. Leukocytosis resolved. VSS. Will finish abx course with PO doxycycline, Augmentin. This should cover colitis as well. Declined need for HHC or HAH.     Stable for dc to home. Total time spent on discharge services 32 minutes.     DISCHARGE PHYSICAL EXAM     Last Recorded Vitals:  Vitals:    12/13/24 1431 12/13/24 1642 12/13/24 2239 12/14/24 0733   BP:  128/63 130/63 137/63   BP Location:  Right arm Right arm Right arm   Patient Position:  Lying Lying Lying   Pulse:  63 64 75   Resp:  18 18 16   Temp:  37.1 °C (98.8 °F) 37 °C (98.6 °F) 37.4 °C (99.3 °F)   TempSrc:  Temporal  Temporal   SpO2: 94% 92% 94% 92%   Weight:       Height:           Physical Exam:  GEN: healthy appearing, appears stated age, NAD  CV: RRR, no m/r/g, no LE edema  LUNGS: no acute resp distress, scant crackles, on room air  ABD: soft, NT, ND, NBS  SKIN: no rashes  MSK; no gross deformities, normal  joints  NEURO: A+Ox3, no FND  PSYCH: appropriate mood, affect      PERTINENT LABS AND IMAGING     Results for orders placed or performed during the hospital encounter of 12/12/24 (from the past 96 hours)   CBC and Auto Differential   Result Value Ref Range    WBC 11.7 (H) 4.4 - 11.3 x10*3/uL    nRBC 0.0 0.0 - 0.0 /100 WBCs    RBC 5.01 4.50 - 5.90 x10*6/uL    Hemoglobin 15.9 13.5 - 17.5 g/dL    Hematocrit 45.9 41.0 - 52.0 %    MCV 92 80 - 100 fL    MCH 31.7 26.0 - 34.0 pg    MCHC 34.6 32.0 - 36.0 g/dL    RDW 12.7 11.5 - 14.5 %    Platelets 239 150 - 450 x10*3/uL    Neutrophils % 91.4 40.0 - 80.0 %    Immature Granulocytes %, Automated 0.3 0.0 - 0.9 %    Lymphocytes % 3.5 13.0 - 44.0 %    Monocytes % 4.3 2.0 - 10.0 %    Eosinophils % 0.3 0.0 - 6.0 %    Basophils % 0.2 0.0 - 2.0 %    Neutrophils Absolute 10.71 (H) 1.60 - 5.50 x10*3/uL    Immature Granulocytes Absolute, Automated 0.04 0.00 - 0.50 x10*3/uL    Lymphocytes Absolute 0.41 (L) 0.80 - 3.00 x10*3/uL    Monocytes Absolute 0.50 0.05 - 0.80 x10*3/uL    Eosinophils Absolute 0.03 0.00 - 0.40 x10*3/uL    Basophils Absolute 0.02 0.00 - 0.10 x10*3/uL   Comprehensive Metabolic Panel   Result Value Ref Range    Glucose 139 (H) 74 - 99 mg/dL    Sodium 137 136 - 145 mmol/L    Potassium 4.1 3.5 - 5.3 mmol/L    Chloride 101 98 - 107 mmol/L    Bicarbonate 27 21 - 32 mmol/L    Anion Gap 13 10 - 20 mmol/L    Urea Nitrogen 22 6 - 23 mg/dL    Creatinine 1.51 (H) 0.50 - 1.30 mg/dL    eGFR 48 (L) >60 mL/min/1.73m*2    Calcium 9.5 8.6 - 10.3 mg/dL    Albumin 4.2 3.4 - 5.0 g/dL    Alkaline Phosphatase 107 33 - 136 U/L    Total Protein 7.3 6.4 - 8.2 g/dL    AST 12 9 - 39 U/L    Bilirubin, Total 0.9 0.0 - 1.2 mg/dL    ALT 15 10 - 52 U/L   Lactate   Result Value Ref Range    Lactate 3.0 (H) 0.4 - 2.0 mmol/L   Blood Culture    Specimen: Peripheral Venipuncture; Blood culture   Result Value Ref Range    Blood Culture No growth at 1 day    Troponin I, High Sensitivity   Result Value Ref  Range    Troponin I, High Sensitivity 4 0 - 20 ng/L   Blood Gas Venous Full Panel   Result Value Ref Range    POCT pH, Venous 7.38 7.33 - 7.43 pH    POCT pCO2, Venous 48 41 - 51 mm Hg    POCT pO2, Venous 24 (L) 35 - 45 mm Hg    POCT SO2, Venous 28 (L) 45 - 75 %    POCT Oxy Hemoglobin, Venous 27.9 (L) 45.0 - 75.0 %    POCT Hematocrit Calculated, Venous 41.0 41.0 - 52.0 %    POCT Sodium, Venous 135 (L) 136 - 145 mmol/L    POCT Potassium, Venous 4.3 3.5 - 5.3 mmol/L    POCT Chloride, Venous 99 98 - 107 mmol/L    POCT Ionized Calicum, Venous 1.20 1.10 - 1.33 mmol/L    POCT Glucose, Venous 149 (H) 74 - 99 mg/dL    POCT Lactate, Venous 3.2 (H) 0.4 - 2.0 mmol/L    POCT Base Excess, Venous 2.5 -2.0 - 3.0 mmol/L    POCT HCO3 Calculated, Venous 28.4 (H) 22.0 - 26.0 mmol/L    POCT Hemoglobin, Venous 13.7 13.5 - 17.5 g/dL    POCT Anion Gap, Venous 12.0 10.0 - 25.0 mmol/L    Patient Temperature      FiO2 21 %   Electrocardiogram, 12-lead ACS symptoms   Result Value Ref Range    Ventricular Rate 97 BPM    Atrial Rate 97 BPM    KY Interval 136 ms    QRS Duration 94 ms    QT Interval 354 ms    QTC Calculation(Bazett) 449 ms    P Axis 24 degrees    R Axis -6 degrees    T Axis 70 degrees    QRS Count 15 beats    Q Onset 221 ms    P Onset 153 ms    P Offset 189 ms    T Offset 398 ms    QTC Fredericia 415 ms   Blood Culture    Specimen: Peripheral Venipuncture; Blood culture   Result Value Ref Range    Blood Culture No growth at 1 day    Sars-CoV-2 PCR   Result Value Ref Range    Coronavirus 2019, PCR Not Detected Not Detected   Influenza A, and B PCR   Result Value Ref Range    Flu A Result Not Detected Not Detected    Flu B Result Not Detected Not Detected   Urinalysis with Reflex Culture and Microscopic   Result Value Ref Range    Color, Urine Yellow Light-Yellow, Yellow, Dark-Yellow    Appearance, Urine Turbid (N) Clear    Specific Gravity, Urine 1.029 1.005 - 1.035    pH, Urine 6.5 5.0, 5.5, 6.0, 6.5, 7.0, 7.5, 8.0    Protein,  Urine 30 (1+) (A) NEGATIVE, 10 (TRACE), 20 (TRACE) mg/dL    Glucose, Urine Normal Normal mg/dL    Blood, Urine NEGATIVE NEGATIVE    Ketones, Urine NEGATIVE NEGATIVE mg/dL    Bilirubin, Urine NEGATIVE NEGATIVE    Urobilinogen, Urine 2 (1+) (A) Normal mg/dL    Nitrite, Urine NEGATIVE NEGATIVE    Leukocyte Esterase, Urine NEGATIVE NEGATIVE   Extra Urine Gray Tube   Result Value Ref Range    Extra Tube Hold for add-ons.    Urinalysis Microscopic   Result Value Ref Range    WBC, Urine 1-5 1-5, NONE /HPF    RBC, Urine 6-10 (A) NONE, 1-2, 3-5 /HPF    Squamous Epithelial Cells, Urine 1-9 (SPARSE) Reference range not established. /HPF    Mucus, Urine FEW Reference range not established. /LPF    Hyaline Casts, Urine OCCASIONAL (A) NONE /LPF    Calcium Oxalate Crystals, Urine 1+ NONE, 1+ /HPF   Blood Gas Lactic Acid, Venous   Result Value Ref Range    POCT Lactate, Venous 0.4 0.4 - 2.0 mmol/L   MRSA Surveillance for Vancomycin De-escalation, PCR    Specimen: Anterior Nares; Swab   Result Value Ref Range    MRSA PCR Not Detected Not Detected   Lactate   Result Value Ref Range    Lactate 1.6 0.4 - 2.0 mmol/L   SST TOP   Result Value Ref Range    Extra Tube Hold for add-ons.    Procalcitonin   Result Value Ref Range    Procalcitonin 2.39 (H) <=0.07 ng/mL   CBC   Result Value Ref Range    WBC 13.0 (H) 4.4 - 11.3 x10*3/uL    nRBC 0.0 0.0 - 0.0 /100 WBCs    RBC 3.92 (L) 4.50 - 5.90 x10*6/uL    Hemoglobin 12.2 (L) 13.5 - 17.5 g/dL    Hematocrit 35.8 (L) 41.0 - 52.0 %    MCV 91 80 - 100 fL    MCH 31.1 26.0 - 34.0 pg    MCHC 34.1 32.0 - 36.0 g/dL    RDW 12.9 11.5 - 14.5 %    Platelets 180 150 - 450 x10*3/uL   Basic metabolic panel   Result Value Ref Range    Glucose 144 (H) 74 - 99 mg/dL    Sodium 139 136 - 145 mmol/L    Potassium 3.8 3.5 - 5.3 mmol/L    Chloride 111 (H) 98 - 107 mmol/L    Bicarbonate 23 21 - 32 mmol/L    Anion Gap 9 (L) 10 - 20 mmol/L    Urea Nitrogen 18 6 - 23 mg/dL    Creatinine 1.24 0.50 - 1.30 mg/dL    eGFR 61 >60  mL/min/1.73m*2    Calcium 7.9 (L) 8.6 - 10.3 mg/dL   POCT GLUCOSE   Result Value Ref Range    POCT Glucose 147 (H) 74 - 99 mg/dL   SST TOP   Result Value Ref Range    Extra Tube Hold for add-ons.    Magnesium   Result Value Ref Range    Magnesium 1.62 1.60 - 2.40 mg/dL   Basic Metabolic Panel   Result Value Ref Range    Glucose 88 74 - 99 mg/dL    Sodium 141 136 - 145 mmol/L    Potassium 3.5 3.5 - 5.3 mmol/L    Chloride 112 (H) 98 - 107 mmol/L    Bicarbonate 24 21 - 32 mmol/L    Anion Gap 9 (L) 10 - 20 mmol/L    Urea Nitrogen 14 6 - 23 mg/dL    Creatinine 1.32 (H) 0.50 - 1.30 mg/dL    eGFR 57 (L) >60 mL/min/1.73m*2    Calcium 8.1 (L) 8.6 - 10.3 mg/dL   CBC   Result Value Ref Range    WBC 8.8 4.4 - 11.3 x10*3/uL    nRBC 0.0 0.0 - 0.0 /100 WBCs    RBC 3.73 (L) 4.50 - 5.90 x10*6/uL    Hemoglobin 11.7 (L) 13.5 - 17.5 g/dL    Hematocrit 34.3 (L) 41.0 - 52.0 %    MCV 92 80 - 100 fL    MCH 31.4 26.0 - 34.0 pg    MCHC 34.1 32.0 - 36.0 g/dL    RDW 12.9 11.5 - 14.5 %    Platelets 190 150 - 450 x10*3/uL        CT chest abdomen pelvis w IV contrast   Final Result   CT CHEST:   Aspiration pneumonia/pneumonitis extensively throughout the lower   lobes.        Chronically reactive enlarged mediastinal lymph nodes stable from   prior study in addition to multiple calcified nodes reflecting remote   granulomatous infection.             CT ABDOMEN/PELVIS:   Mild wall thickening of the descending or sigmoid colon concerning   for mild colitis.        Borderline aneurysmal dilatation of the aorta measuring 3 cm x 3 cm.        Cholelithiasis.        MACRO:   None.        Signed by: Natanael Akbar 12/12/2024 8:30 PM   Dictation workstation:   [id number]      CT head wo IV contrast   Final Result   1. Encephalomalacia in the right cerebral hemisphere, consistent with   prior infarct.   2. Diffuse volume loss and periventricular white matter changes, most   consistent with small vessel ischemic disease.   3. No evidence of acute  cortical infarct or intracranial hemorrhage.             MACRO:   None        Signed by: Lalo Gooden 12/12/2024 7:50 PM   Dictation workstation:   [id number]      XR chest 1 view   Final Result   1.  Findings suggestive of vascular congestion/mild edema.                  MACRO:   None        Signed by: Benedict Willingham 12/12/2024 6:24 PM   Dictation workstation:   [id number]          No echocardiogram results found for the past 14 days    DISCHARGE MEDICATIONS        Your medication list        START taking these medications        Instructions Last Dose Given Next Dose Due   albuterol 90 mcg/actuation inhaler           amoxicillin-pot clavulanate 875-125 mg tablet  Commonly known as: Augmentin      Take 1 tablet by mouth 2 times a day for 5 days.       doxycycline 100 mg capsule  Commonly known as: Vibramycin      Take 1 capsule (100 mg) by mouth every 12 hours for 8 doses. Take with a full glass of water and do not lie down for at least 30 minutes after.              CONTINUE taking these medications        Instructions Last Dose Given Next Dose Due   aspirin 81 mg chewable tablet           atorvastatin 80 mg tablet  Commonly known as: Lipitor      TAKE 1 TABLET BY MOUTH DAILY       clopidogrel 75 mg tablet  Commonly known as: Plavix      Take 1 tablet (75 mg) by mouth once daily.       docusate sodium 100 mg capsule  Commonly known as: Colace           escitalopram 10 mg tablet  Commonly known as: Lexapro      Take 1 tablet (10 mg) by mouth once daily.       esomeprazole 40 mg DR capsule  Commonly known as: NexIUM      Take 1 capsule (40 mg) by mouth once daily.                 Where to Get Your Medications        These medications were sent to [id number] [address] AT [facility] & [address] [address]      Hours: 24-hours Phone: [phone]   amoxicillin-pot clavulanate 875-125 mg tablet  doxycycline 100 mg capsule          OUTPATIENT FOLLOW-UP     Future Appointments   Date Time Provider Department Center   2/21/2025  9:30 AM Conor Roe MD [id number] [place]   3/13/2025  9:00 AM [facility] 2 [facility]   3/20/2025  9:30 AM Maurice Jaquez MD [id number] [place]   4/23/2025  8:30 AM Dnae Estrada MD [id number] [place]

## 2024-12-14 NOTE — CARE PLAN
The patient's goals for the shift include Pt want to rest    The clinical goals for the shift include safety      Problem: Safety - Adult  Goal: Free from fall injury  Outcome: Progressing     Problem: Discharge Planning  Goal: Discharge to home or other facility with appropriate resources  Outcome: Progressing     Problem: Chronic Conditions and Co-morbidities  Goal: Patient's chronic conditions and co-morbidity symptoms are monitored and maintained or improved  Outcome: Progressing     Problem: Skin  Goal: Decreased wound size/increased tissue granulation at next dressing change  Outcome: Progressing  Flowsheets (Taken 12/14/2024 0337)  Decreased wound size/increased tissue granulation at next dressing change: Promote sleep for wound healing  Goal: Participates in plan/prevention/treatment measures  Outcome: Progressing  Flowsheets (Taken 12/14/2024 0337)  Participates in plan/prevention/treatment measures: Elevate heels  Goal: Prevent/manage excess moisture  Outcome: Progressing  Flowsheets (Taken 12/14/2024 0337)  Prevent/manage excess moisture:   Monitor for/manage infection if present   Moisturize dry skin   Cleanse incontinence/protect with barrier cream  Goal: Prevent/minimize sheer/friction injuries  Outcome: Progressing  Flowsheets (Taken 12/14/2024 0337)  Prevent/minimize sheer/friction injuries:   Use pull sheet   Complete micro-shifts as needed if patient unable. Adjust patient position to relieve pressure points, not a full turn   HOB 30 degrees or less  Goal: Promote/optimize nutrition  Outcome: Progressing  Flowsheets (Taken 12/14/2024 0337)  Promote/optimize nutrition:   Assist with feeding   Offer water/supplements/favorite foods  Goal: Promote skin healing  Outcome: Progressing  Flowsheets (Taken 12/14/2024 0337)  Promote skin healing:   Assess skin/pad under line(s)/device(s)   Protective dressings over bony prominences     Problem: Fall/Injury  Goal: Not fall by end of shift  Outcome:  Progressing  Goal: Be free from injury by end of the shift  Outcome: Progressing  Goal: Verbalize understanding of personal risk factors for fall in the hospital  Outcome: Progressing  Goal: Verbalize understanding of risk factor reduction measures to prevent injury from fall in the home  Outcome: Progressing  Goal: Use assistive devices by end of the shift  Outcome: Progressing  Goal: Pace activities to prevent fatigue by end of the shift  Outcome: Progressing     Problem: Pain  Goal: Takes deep breaths with improved pain control throughout the shift  Outcome: Progressing  Goal: Turns in bed with improved pain control throughout the shift  Outcome: Progressing  Goal: Walks with improved pain control throughout the shift  Outcome: Progressing  Goal: Performs ADL's with improved pain control throughout shift  Outcome: Progressing  Goal: Participates in PT with improved pain control throughout the shift  Outcome: Progressing  Goal: Free from opioid side effects throughout the shift  Outcome: Progressing  Goal: Free from acute confusion related to pain meds throughout the shift  Outcome: Progressing

## 2024-12-15 LAB
ATRIAL RATE: 97 BPM
BACTERIA BLD CULT: NORMAL
BACTERIA BLD CULT: NORMAL
P AXIS: 24 DEGREES
P OFFSET: 189 MS
P ONSET: 153 MS
PR INTERVAL: 136 MS
Q ONSET: 221 MS
QRS COUNT: 15 BEATS
QRS DURATION: 94 MS
QT INTERVAL: 354 MS
QTC CALCULATION(BAZETT): 449 MS
QTC FREDERICIA: 415 MS
R AXIS: -6 DEGREES
T AXIS: 70 DEGREES
T OFFSET: 398 MS
VENTRICULAR RATE: 97 BPM

## 2024-12-16 ENCOUNTER — PATIENT OUTREACH (OUTPATIENT)
Dept: PRIMARY CARE | Facility: CLINIC | Age: 74
End: 2024-12-16
Payer: MEDICARE

## 2024-12-16 NOTE — PROGRESS NOTES
Discharge Facility:[facility]  Discharge Diagnosis:Syncope  Sepsis due to multifocal PNA  AURA  Acute hypoxemic respiratory failure  Colitis  Admission Date:12.13.24  Discharge Date: 12.14.24    PCP Appointment Date:Messaged office as follow up appt was not available within 14 days      Specialist Appointment Date:   Hospital Encounter and Summary Linked: Yes  See discharge assessment below for further details  Engagement  Call Start Time: 1522 (12/16/2024  3:22 PM)    Medications  Medications reviewed with patient/caregiver?: Yes (12/16/2024  3:22 PM)  Is the patient having any side effects they believe may be caused by any medication additions or changes?: No (12/16/2024  3:22 PM)  Does the patient have all medications ordered at discharge?: Yes (12/16/2024  3:22 PM)  Care Management Interventions: No intervention needed (12/16/2024  3:22 PM)  Prescription Comments: START taking these medications         Instructions  Last Dose Given  Next Dose Due  albuterol 90 mcg/actuation inhaler                 amoxicillin-pot clavulanate 875-125 mg tablet  Commonly known as: Augmentin        Take 1 tablet by mouth 2 times a day for 5 days.           doxycycline 100 mg capsule  Commonly known as: Vibramycin        Take 1 capsule (100 mg) by mouth every 12 hours for 8 doses. Take with a full glass of water and do not lie down for at least 30 minutes after. (12/16/2024  3:22 PM)  Is the patient taking all medications as directed (includes completed medication regime)?: Yes (12/16/2024  3:22 PM)  Care Management Interventions: Provided patient education (12/16/2024  3:22 PM)  Medication Comments: Patient verbalized understanding of discharge medications. (12/16/2024  3:22 PM)    Appointments  Does the patient have a primary care provider?: Yes (12/16/2024  3:22 PM)  Care Management Interventions: Educated patient on importance of making appointment (Messaged office as follow up appt was not available within 14 days) (12/16/2024   3:22 PM)  Has the patient kept scheduled appointments due by today?: Yes (12/16/2024  3:22 PM)    Self Management  What is the home health agency?: n/a (12/16/2024  3:22 PM)  What Durable Medical Equipment (DME) was ordered?: n/a (12/16/2024  3:22 PM)    Patient Teaching  Does the patient have access to their discharge instructions?: Yes (12/16/2024  3:22 PM)  Care Management Interventions: Reviewed instructions with patient (12/16/2024  3:22 PM)  What is the patient's perception of their health status since discharge?: Improving (12/16/2024  3:22 PM)  Is the patient/caregiver able to teach back the hierarchy of who to call/visit for symptoms/problems? PCP, Specialist, Home Health nurse, Urgent Care, ED, 911: Yes (12/16/2024  3:22 PM)  Patient/Caregiver Education Comments: Spoke with spouse. States patient is improved. No further syncopal episodes. Denies SOB today. Is taking meds as directed.Messaged office as follow up appt was not available within 14 days (12/16/2024  3:22 PM)

## 2024-12-17 LAB
BACTERIA BLD CULT: NORMAL
BACTERIA BLD CULT: NORMAL

## 2024-12-31 ENCOUNTER — PATIENT OUTREACH (OUTPATIENT)
Dept: PRIMARY CARE | Facility: CLINIC | Age: 74
End: 2024-12-31
Payer: MEDICARE

## 2025-01-29 ENCOUNTER — PATIENT OUTREACH (OUTPATIENT)
Dept: PRIMARY CARE | Facility: CLINIC | Age: 75
End: 2025-01-29
Payer: MEDICARE

## 2025-02-21 ENCOUNTER — APPOINTMENT (OUTPATIENT)
Dept: PRIMARY CARE | Facility: CLINIC | Age: 75
End: 2025-02-21
Payer: MEDICARE

## 2025-02-21 VITALS
TEMPERATURE: 96 F | SYSTOLIC BLOOD PRESSURE: 98 MMHG | DIASTOLIC BLOOD PRESSURE: 67 MMHG | WEIGHT: 178 LBS | HEART RATE: 78 BPM | BODY MASS INDEX: 26.98 KG/M2 | HEIGHT: 68 IN

## 2025-02-21 DIAGNOSIS — Z12.12 SCREENING FOR COLORECTAL CANCER: ICD-10-CM

## 2025-02-21 DIAGNOSIS — I69.354 HEMIPLEGIA AND HEMIPARESIS FOLLOWING CEREBRAL INFARCTION AFFECTING LEFT NON-DOMINANT SIDE (MULTI): ICD-10-CM

## 2025-02-21 DIAGNOSIS — Z00.00 ROUTINE GENERAL MEDICAL EXAMINATION AT HEALTH CARE FACILITY: Primary | ICD-10-CM

## 2025-02-21 DIAGNOSIS — Z12.11 SCREENING FOR COLORECTAL CANCER: ICD-10-CM

## 2025-02-21 PROCEDURE — 3008F BODY MASS INDEX DOCD: CPT | Performed by: INTERNAL MEDICINE

## 2025-02-21 PROCEDURE — G0439 PPPS, SUBSEQ VISIT: HCPCS | Performed by: INTERNAL MEDICINE

## 2025-02-21 PROCEDURE — 1160F RVW MEDS BY RX/DR IN RCRD: CPT | Performed by: INTERNAL MEDICINE

## 2025-02-21 PROCEDURE — 1159F MED LIST DOCD IN RCRD: CPT | Performed by: INTERNAL MEDICINE

## 2025-02-21 PROCEDURE — 1123F ACP DISCUSS/DSCN MKR DOCD: CPT | Performed by: INTERNAL MEDICINE

## 2025-02-21 PROCEDURE — 1036F TOBACCO NON-USER: CPT | Performed by: INTERNAL MEDICINE

## 2025-02-21 PROCEDURE — 1170F FXNL STATUS ASSESSED: CPT | Performed by: INTERNAL MEDICINE

## 2025-02-21 ASSESSMENT — PATIENT HEALTH QUESTIONNAIRE - PHQ9
SUM OF ALL RESPONSES TO PHQ9 QUESTIONS 1 AND 2: 0
1. LITTLE INTEREST OR PLEASURE IN DOING THINGS: NOT AT ALL
2. FEELING DOWN, DEPRESSED OR HOPELESS: NOT AT ALL
SUM OF ALL RESPONSES TO PHQ9 QUESTIONS 1 AND 2: 0
1. LITTLE INTEREST OR PLEASURE IN DOING THINGS: NOT AT ALL
2. FEELING DOWN, DEPRESSED OR HOPELESS: NOT AT ALL
SUM OF ALL RESPONSES TO PHQ9 QUESTIONS 1 AND 2: 0
2. FEELING DOWN, DEPRESSED OR HOPELESS: NOT AT ALL
1. LITTLE INTEREST OR PLEASURE IN DOING THINGS: NOT AT ALL

## 2025-02-21 ASSESSMENT — ACTIVITIES OF DAILY LIVING (ADL)
BATHING: INDEPENDENT
GROOMING: INDEPENDENT
TAKING MEDICATION: NEEDS ASSISTANCE
JUDGMENT_ADEQUATE_SAFELY_COMPLETE_DAILY_ACTIVITIES: YES
DRESSING: INDEPENDENT
HEARING - RIGHT EAR: FUNCTIONAL
BATHING: INDEPENDENT
DOING HOUSEWORK: TOTAL CARE
JUDGMENT_ADEQUATE_SAFELY_COMPLETE_DAILY_ACTIVITIES: YES
NEEDS ASSISTANCE WITH FOOD: SET UP OF PLATE
HEARING - LEFT EAR: FUNCTIONAL
TOILETING: INDEPENDENT
WALKS IN HOME: NEEDS ASSISTANCE
STIL DRIVING: NO
DRESSING YOURSELF: INDEPENDENT
FEEDING: INDEPENDENT
ADEQUATE_TO_COMPLETE_ADL: YES
GROCERY SHOPPING: NEEDS ASSISTANCE
FEEDING YOURSELF: INDEPENDENT
MANAGING FINANCES: NEEDS ASSISTANCE
TOILETING: INDEPENDENT
USING TELEPHONE: NEEDS ASSISTANCE
EATING: NEEDS ASSISTANCE
PILL BOX USED: NO
PREPARING MEALS: NEEDS ASSISTANCE
PATIENT'S MEMORY ADEQUATE TO SAFELY COMPLETE DAILY ACTIVITIES?: YES
USING TRANSPORTATION: TOTAL CARE
ADEQUATE_TO_COMPLETE_ADL: YES

## 2025-02-21 ASSESSMENT — ENCOUNTER SYMPTOMS
ARTHRALGIAS: 1
DEPRESSION: 0
DIZZINESS: 0
CHOKING: 0
WEAKNESS: 1
GASTROINTESTINAL NEGATIVE: 1
COUGH: 0
OCCASIONAL FEELINGS OF UNSTEADINESS: 0
CONSTITUTIONAL NEGATIVE: 1
CARDIOVASCULAR NEGATIVE: 1
SHORTNESS OF BREATH: 0
LOSS OF SENSATION IN FEET: 0
WOUND: 0
TROUBLE SWALLOWING: 0

## 2025-02-21 NOTE — PROGRESS NOTES
Subjective   Reason for Visit: Lukas Shen is an 74 y.o. male here for a Medicare Wellness visit.     Past Medical, Surgical, and Family History reviewed and updated in chart.    Reviewed all medications by prescribing practitioner or clinical pharmacist (such as prescriptions, OTCs, herbal therapies and supplements) and documented in the medical record.    74-year-old male patient was seen for wellness exam.  He has a hospitalization in month of December for nausea vomiting, multiple pulmonary infiltrates were found, aspiration was seen,  swallow evaluation was done, back on regular diet, he always have a low BP, somehow BP readings has been in the 90s.  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  Excellent family support is there, wife is always there for him.  Admission risk is 16%, he did not pass out, he has a spastic hemiparesis, he always have hemiplegic gait.  Living will is not up to date, RSV vaccine can be obtained, all hospitalization related data and information were reviewed, hypoxic respiratory failure is gone, sepsis is resolved.        Patient Care Team:  Conor Roe MD as PCP - General  Conor Roe MD as PCP - [employer] Advantage PCP  Dane Estrada MD as Cardiologist (Cardiology)  Jordyn Martinez, RN as Care Manager (Case Management)     Review of Systems   Constitutional: Negative.    HENT:  Negative for congestion and trouble swallowing.    Eyes:  Negative for visual disturbance.   Respiratory:  Negative for cough, choking and shortness of breath.    Cardiovascular: Negative.    Gastrointestinal: Negative.    Musculoskeletal:  Positive for arthralgias and gait problem.   Skin:  Negative for rash and wound.   Neurological:   "Positive for weakness. Negative for dizziness.       Objective   Vitals:  Temp 35.6 °C (96 °F) (Temporal)   Ht 1.727 m (5' 8\")   Wt 80.7 kg (178 lb)   BMI 27.06 kg/m²       Physical Exam  Constitutional:       Appearance: Normal appearance. He is normal weight.   HENT:      Head: Normocephalic.   Eyes:      Conjunctiva/sclera: Conjunctivae normal.   Cardiovascular:      Rate and Rhythm: Normal rate and regular rhythm.      Pulses:           Carotid pulses are  on the left side with bruit.     Heart sounds: Normal heart sounds.   Pulmonary:      Effort: Pulmonary effort is normal.      Breath sounds: Normal breath sounds.   Abdominal:      General: Abdomen is flat.      Palpations: Abdomen is soft.   Musculoskeletal:         General: Normal range of motion.      Cervical back: Neck supple.   Skin:     General: Skin is warm and dry.   Neurological:      General: No focal deficit present.      Mental Status: He is oriented to person, place, and time. Mental status is at baseline.      Motor: Weakness present.      Gait: Gait abnormal.   Psychiatric:         Mood and Affect: Mood normal.         Behavior: Behavior normal.       Assessment & Plan  Screening for colorectal cancer         Routine general medical examination at health care facility    Orders:    1 Year Follow Up In Primary Care - Wellness Exam; Future    FIT (FOBT Immunoassay); Future    Hemiplegia and hemiparesis following cerebral infarction affecting left non-dominant side (Multi)       I spent <16 minutes discussing advance care planning including the explanation and discussion of advance directives. If pt does not have currant up to date documents, examples and information provided on how to create both living will and power of .Patient was encouraged to work on completing these documents.  Discussed about living will please make sure that living will is done, he is back to normal, we will do stool for fit test, maybe it is easier to do it.  " Colon cancer screening needs to be done, PSA LDL from last time reviewed, wife was present, domiciliary set up is excellent, family support is excellent, no fall, his mental state and cognitive state of mind is appropriate, skin is dry hydration could be better, renal functions has been back to baseline.  He will continue his dual antiplatelet treatment high-dose statin, no other concerns today although susceptible for falls, ER visits, aspiration pneumonia, 1 or other problems.  Wife did not have any question, not using any support device, he does not fall usually, he sleeps well, no pain or discomfort.  Complete physical exam was done during the process of wellness exam, follow-up in 3 months.

## 2025-03-12 ENCOUNTER — PATIENT OUTREACH (OUTPATIENT)
Dept: PRIMARY CARE | Facility: CLINIC | Age: 75
End: 2025-03-12
Payer: MEDICARE

## 2025-03-12 LAB — HEMOCCULT STL QL IA: NORMAL

## 2025-03-12 NOTE — PROGRESS NOTES
Call placed regarding 90 days discharge follow up call.              At time of outreach call the patient feels as if their hospitalized condition has               returned to baseline.              Questions or concerns regarding recovery period addressed at this time.               Reviewed any PCP or specialists progress notes/labs/radiology reports if applicable              and addressed any questions or concerns.

## 2025-03-13 ENCOUNTER — HOSPITAL ENCOUNTER (OUTPATIENT)
Dept: RADIOLOGY | Facility: HOSPITAL | Age: 75
Discharge: HOME | End: 2025-03-13
Payer: MEDICARE

## 2025-03-13 DIAGNOSIS — I65.23 CAROTID STENOSIS, ASYMPTOMATIC, BILATERAL: ICD-10-CM

## 2025-03-13 PROCEDURE — 93880 EXTRACRANIAL BILAT STUDY: CPT | Performed by: RADIOLOGY

## 2025-03-13 PROCEDURE — 93880 EXTRACRANIAL BILAT STUDY: CPT

## 2025-03-20 ENCOUNTER — APPOINTMENT (OUTPATIENT)
Dept: VASCULAR SURGERY | Facility: CLINIC | Age: 75
End: 2025-03-20
Payer: MEDICARE

## 2025-03-25 DIAGNOSIS — Z76.89 ENCOUNTER FOR NAIL CARE: ICD-10-CM

## 2025-03-25 DIAGNOSIS — B35.1 NAIL FUNGAL INFECTION: ICD-10-CM

## 2025-03-27 ENCOUNTER — OFFICE VISIT (OUTPATIENT)
Dept: VASCULAR SURGERY | Facility: CLINIC | Age: 75
End: 2025-03-27
Payer: MEDICARE

## 2025-03-27 VITALS
HEIGHT: 68 IN | SYSTOLIC BLOOD PRESSURE: 118 MMHG | HEART RATE: 74 BPM | BODY MASS INDEX: 26.67 KG/M2 | TEMPERATURE: 97.1 F | DIASTOLIC BLOOD PRESSURE: 72 MMHG | WEIGHT: 176 LBS

## 2025-03-27 DIAGNOSIS — I65.22 STENOSIS OF LEFT CAROTID ARTERY: Primary | ICD-10-CM

## 2025-03-27 PROCEDURE — 3008F BODY MASS INDEX DOCD: CPT | Performed by: STUDENT IN AN ORGANIZED HEALTH CARE EDUCATION/TRAINING PROGRAM

## 2025-03-27 PROCEDURE — 99213 OFFICE O/P EST LOW 20 MIN: CPT | Performed by: STUDENT IN AN ORGANIZED HEALTH CARE EDUCATION/TRAINING PROGRAM

## 2025-03-27 PROCEDURE — 1036F TOBACCO NON-USER: CPT | Performed by: STUDENT IN AN ORGANIZED HEALTH CARE EDUCATION/TRAINING PROGRAM

## 2025-03-27 PROCEDURE — 1123F ACP DISCUSS/DSCN MKR DOCD: CPT | Performed by: STUDENT IN AN ORGANIZED HEALTH CARE EDUCATION/TRAINING PROGRAM

## 2025-03-27 PROCEDURE — 1159F MED LIST DOCD IN RCRD: CPT | Performed by: STUDENT IN AN ORGANIZED HEALTH CARE EDUCATION/TRAINING PROGRAM

## 2025-03-27 ASSESSMENT — PATIENT HEALTH QUESTIONNAIRE - PHQ9
2. FEELING DOWN, DEPRESSED OR HOPELESS: NOT AT ALL
1. LITTLE INTEREST OR PLEASURE IN DOING THINGS: NOT AT ALL
SUM OF ALL RESPONSES TO PHQ9 QUESTIONS 1 AND 2: 0

## 2025-03-27 NOTE — PROGRESS NOTES
Vascular Surgery Clinic Note    CC: carotid    HPI:  Lukas Shen is a 74 y.o. male with history of right ICA occlusion and stroke affecting left arm/leg. He is doing well, denies any recent stroke or TIA. He has mild improvement with left arm function. Can walk with a cane.    I reviewed his duplex which shows stable left ICA disease. Former smoker, quit in 2018. 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    Meds:   Current Outpatient Medications on File Prior to Visit   Medication Sig Dispense Refill    albuterol 90 mcg/actuation inhaler Inhale 4 puffs every 6 hours if needed.      aspirin 81 mg chewable tablet Chew 1 tablet (81 mg) once daily.      atorvastatin (Lipitor) 80 mg tablet TAKE 1 TABLET BY MOUTH DAILY 90 tablet 3    clopidogrel (Plavix) 75 mg tablet Take 1 tablet (75 mg) by mouth once daily. 90 tablet 3    docusate sodium (Colace) 100 mg capsule Take 1 capsule (100 mg) by mouth once daily.      escitalopram (Lexapro) 10 mg tablet Take 1 tablet (10 mg) by mouth once daily. 90 tablet 3    esomeprazole (NexIUM) 40 mg DR capsule Take 1 capsule (40 mg) by mouth once daily. 90 capsule 3     No current facility-administered medications on file prior to visit.        Allergies:   No Known Allergies    SH:    Social Drivers of Health     Tobacco Use: Medium Risk (3/27/2025)    Patient History     Smoking Tobacco Use: Former     Smokeless Tobacco Use: Never     Passive Exposure: Not on file   Alcohol Use: Not At Risk (12/13/2024)    AUDIT-C     Frequency of Alcohol Consumption: Never     Average Number of Drinks: Patient does not drink     Frequency of Binge Drinking: Never   Financial Resource Strain: Patient Declined (12/14/2024)    Overall Financial Resource Strain (CARDIA)     Difficulty of Paying Living Expenses: Patient declined   Food Insecurity: Patient Declined (12/14/2024)    Hunger Vital Sign     Worried About Running Out  of Food in the Last Year: Patient declined     Ran Out of Food in the Last Year: Patient declined   Transportation Needs: Patient Declined (12/14/2024)    PRAPARE - Transportation     Lack of Transportation (Medical): Patient declined     Lack of Transportation (Non-Medical): Patient declined   Physical Activity: Inactive (12/14/2024)    Exercise Vital Sign     Days of Exercise per Week: 0 days     Minutes of Exercise per Session: 0 min   Stress: Patient Declined (12/14/2024)    [employer] - Occupational Stress Questionnaire     Feeling of Stress : Patient declined   Social Connections: Patient Declined (12/14/2024)    Social Connection and Isolation Panel [NHANES]     Frequency of Communication with Friends and Family: Patient declined     Frequency of Social Gatherings with Friends and Family: Patient declined     Attends Hindu Services: Patient declined     Active Member of Clubs or Organizations: Patient declined     Attends Club or Organization Meetings: Patient declined     Marital Status: Patient declined   Intimate Partner Violence: Not At Risk (12/14/2024)    Humiliation, Afraid, Rape, and Kick questionnaire     Fear of Current or Ex-Partner: No     Emotionally Abused: No     Physically Abused: No     Sexually Abused: No   Depression: Not at risk (3/27/2025)    PHQ-2     PHQ-2 Score: 0   Housing Stability: Patient Declined (12/14/2024)    Housing Stability Vital Sign     Unable to Pay for Housing in the Last Year: Patient declined     Number of Times Moved in the Last Year: 0     Homeless in the Last Year: Patient declined   Utilities: Patient Declined (12/14/2024)    [facility] Utilities     Threatened with loss of utilities: Patient declined   Digital Equity: Not on file   Health Literacy: Inadequate Health Literacy (12/13/2024)     Health Literacy     Frequency of need for help with medical instructions: Often        FH:  Family History   Problem Relation Name Age of Onset     Alzheimer's disease Mother      Heart attack Father          ROS:  All systems were reviewed and are negative except as per HPI.    Objective:  Vitals:  Vitals:    03/27/25 0844   BP: 118/72   Pulse: 74   Temp: 36.2 °C (97.1 °F)        Exam:  In NAD, well appearing  Abd Soft, ND/NT  Vascular examination:  Strong radial pulses. left arm weakness, but functional.   Walks with a cane.    Assessment & Plan:  Lukas Shen is 74 y.o. male with stable L ICA stenosis about 50%. No recent stroke or TIA. Continue with BMT. RTC 1 year with repeat testing.    Esperanza Hsu PA-C

## 2025-04-23 ENCOUNTER — APPOINTMENT (OUTPATIENT)
Dept: CARDIOLOGY | Facility: CLINIC | Age: 75
End: 2025-04-23
Payer: MEDICARE

## 2025-04-23 VITALS
HEART RATE: 105 BPM | SYSTOLIC BLOOD PRESSURE: 94 MMHG | WEIGHT: 173.8 LBS | BODY MASS INDEX: 26.43 KG/M2 | DIASTOLIC BLOOD PRESSURE: 58 MMHG

## 2025-04-23 DIAGNOSIS — E78.00 PURE HYPERCHOLESTEROLEMIA: ICD-10-CM

## 2025-04-23 DIAGNOSIS — I65.21 CAROTID STENOSIS, RIGHT: ICD-10-CM

## 2025-04-23 DIAGNOSIS — I47.20 PAROXYSMAL VENTRICULAR TACHYCARDIA: ICD-10-CM

## 2025-04-23 DIAGNOSIS — R55 SYNCOPE AND COLLAPSE: ICD-10-CM

## 2025-04-23 DIAGNOSIS — J96.01 ACUTE RESPIRATORY FAILURE WITH HYPOXIA: ICD-10-CM

## 2025-04-23 PROBLEM — J96.00 ACUTE RESPIRATORY FAILURE: Status: ACTIVE | Noted: 2025-04-23

## 2025-04-23 PROCEDURE — 1123F ACP DISCUSS/DSCN MKR DOCD: CPT | Performed by: INTERNAL MEDICINE

## 2025-04-23 PROCEDURE — 1159F MED LIST DOCD IN RCRD: CPT | Performed by: INTERNAL MEDICINE

## 2025-04-23 PROCEDURE — 99214 OFFICE O/P EST MOD 30 MIN: CPT | Performed by: INTERNAL MEDICINE

## 2025-04-23 PROCEDURE — 1036F TOBACCO NON-USER: CPT | Performed by: INTERNAL MEDICINE

## 2025-04-23 RX ORDER — METOPROLOL SUCCINATE 25 MG/1
12.5 TABLET, EXTENDED RELEASE ORAL
Qty: 23 TABLET | Refills: 3 | Status: SHIPPED | OUTPATIENT
Start: 2025-04-23 | End: 2026-04-23

## 2025-04-23 ASSESSMENT — ENCOUNTER SYMPTOMS
NEUROLOGICAL NEGATIVE: 1
CONSTITUTIONAL NEGATIVE: 1
CARDIOVASCULAR NEGATIVE: 1
RESPIRATORY NEGATIVE: 1

## 2025-04-23 NOTE — PATIENT INSTRUCTIONS
Start met. Succ. 12.5mg every other day  3 month visit  Patient educated on proper medication use.   Patient educated on risk factor modification.   Please bring any lab results from other providers / physicians to your next appointment.     Please bring all medicines, vitamins, and herbal supplements with you when you come to the office.     Prescriptions will not be filled unless you are compliant with your follow up appointments or have a follow up appointment scheduled as per instruction of your physician. Refills should be requested at the time of your visit.

## 2025-04-23 NOTE — PROGRESS NOTES
CARDIOLOGY OFFICE VISIT      CHIEF COMPLAINT  No chief complaint on file.       HISTORY OF PRESENT ILLNESS  Lukas Shen is a 75 y.o. year old male patient with a history of prior CVA with residual left hemiparesis, hypertension dyslipidemia and carotid stenosis who had an episode of syncope in the setting of persistent cough secondary to COVID.  His echocardiogram shows normal LV function with no gross valvular abnormalities.  He has been doing well denying any recent chest pain or significant shortness of breath.  He was noted to be tachycardic on examination today and he complains of some occasional left-sided chest discomfort that does not seem to be related to exertion.  There are no other associated symptoms.    ASSESSMENT AND PLAN  1.  Resting tachycardia: Will like to try him on Toprol-XL at 12.5 mg every other day to avoid his previous problem with fatigue and also because of his relative hypotension.  Will reevaluate when he comes for follow-up.  2.  Status post CVA with left-sided residual, continue with Plavix and aspirin.  3.  Dyslipidemia: Lipid profile is acceptable, will continue with Lipitor at 80 mg daily    Problem List Items Addressed This Visit       Pure hypercholesterolemia    Tobacco abuse, in remission    Carotid stenosis, right    Syncope and collapse    BMI 26.0-26.9,adult    Acute respiratory failure         Past Medical History  Medical History[1]    Social History  Social History[2]    Family History   Family History[3]     Allergies:  RX Allergies[4]     Outpatient Medications:  Current Outpatient Medications   Medication Instructions    albuterol 90 mcg/actuation inhaler 4 puffs, inhalation, Every 6 hours PRN    aspirin 81 mg, Daily    atorvastatin (LIPITOR) 80 mg, oral, Daily    clopidogrel (PLAVIX) 75 mg, oral, Daily    docusate sodium (COLACE) 100 mg, Daily    escitalopram (LEXAPRO) 10 mg, oral, Daily    esomeprazole (NEXIUM) 40 mg, oral, Daily        Recent Lab Results:  I have  "personally reviewed the below noted labs;    CBC:   Lab Results   Component Value Date    WBC 8.8 12/14/2024    RBC 3.73 (L) 12/14/2024    HGB 11.7 (L) 12/14/2024    HCT 34.3 (L) 12/14/2024     12/14/2024        CMP:    Lab Results   Component Value Date     12/14/2024    K 3.5 12/14/2024     (H) 12/14/2024    CO2 24 12/14/2024    BUN 14 12/14/2024    CREATININE 1.32 (H) 12/14/2024    GLUCOSE 88 12/14/2024    CALCIUM 8.1 (L) 12/14/2024       Magnesium:    Lab Results   Component Value Date    MG 1.62 12/14/2024       Lipid Profile:    Lab Results   Component Value Date    TRIG 163 (H) 11/08/2024    HDL 28.9 11/08/2024    LDLCALC 66 11/08/2024    LDLDIRECT 66 08/12/2022       TSH:    No results found for: \"TSH\"    BNP:   Lab Results   Component Value Date    BNP 26 06/26/2024        PT/INR:    Lab Results   Component Value Date    PROTIME 12.5 06/26/2024    INR 1.1 06/26/2024       HgBA1c:    Lab Results   Component Value Date    HGBA1C 5.3 04/18/2021       BMP:  Lab Results   Component Value Date     12/14/2024     12/13/2024     12/12/2024    K 3.5 12/14/2024    K 3.8 12/13/2024    K 4.1 12/12/2024     (H) 12/14/2024     (H) 12/13/2024     12/12/2024    CO2 24 12/14/2024    CO2 23 12/13/2024    CO2 27 12/12/2024    BUN 14 12/14/2024    BUN 18 12/13/2024    BUN 22 12/12/2024    CREATININE 1.32 (H) 12/14/2024    CREATININE 1.24 12/13/2024    CREATININE 1.51 (H) 12/12/2024       CBC:  Lab Results   Component Value Date    WBC 8.8 12/14/2024    WBC 13.0 (H) 12/13/2024    WBC 11.7 (H) 12/12/2024    RBC 3.73 (L) 12/14/2024    RBC 3.92 (L) 12/13/2024    RBC 5.01 12/12/2024    HGB 11.7 (L) 12/14/2024    HGB 12.2 (L) 12/13/2024    HGB 15.9 12/12/2024    HCT 34.3 (L) 12/14/2024    HCT 35.8 (L) 12/13/2024    HCT 45.9 12/12/2024    MCV 92 12/14/2024    MCV 91 12/13/2024    MCV 92 12/12/2024    MCH 31.4 12/14/2024    MCH 31.1 12/13/2024    MCH 31.7 12/12/2024    MCHC 34.1 " 12/14/2024    MCHC 34.1 12/13/2024    MCHC 34.6 12/12/2024    RDW 12.9 12/14/2024    RDW 12.9 12/13/2024    RDW 12.7 12/12/2024     12/14/2024     12/13/2024     12/12/2024       Cardiac Enzymes:    Lab Results   Component Value Date    TROPHS 4 12/12/2024    TROPHS 5 06/26/2024    TROPHS 7 06/26/2024       Hepatic Function Panel:    Lab Results   Component Value Date    ALKPHOS 107 12/12/2024    ALT 15 12/12/2024    AST 12 12/12/2024    PROT 7.3 12/12/2024    BILITOT 0.9 12/12/2024    BILIDIR 0.1 04/17/2021         REVIEW OF SYSTEMS  Review of Systems   Constitutional: Negative.   Cardiovascular: Negative.    Respiratory: Negative.     Neurological: Negative.    All other systems reviewed and are negative.      VITALS  There were no vitals filed for this visit.  Wt Readings from Last 4 Encounters:   03/27/25 79.8 kg (176 lb)   02/21/25 80.7 kg (178 lb)   12/12/24 80.3 kg (177 lb)   11/08/24 80.3 kg (177 lb)       PHYSICAL EXAM  Constitutional:       Appearance: Healthy appearance.   Eyes:      Pupils: Pupils are equal, round, and reactive to light.   Pulmonary:      Effort: Pulmonary effort is normal.      Breath sounds: Normal breath sounds.   Cardiovascular:      PMI at left midclavicular line. Normal rate. Regular rhythm.      Murmurs: There is no murmur.      No gallop.  No click. No rub.   Pulses:     Intact distal pulses.   Musculoskeletal: Normal range of motion.      Cervical back: Normal range of motion. Skin:     General: Skin is warm and dry.   Neurological:      General: No focal deficit present.      Mental Status: Alert and oriented to person, place and time.             ICullen LPN   am scribing for, and in the presence of Dr. Dane Estrada MD  .    IDr. Dane MD    , personally performed the services described in the documentation as scribed by Cullen Olvera LPN   in my presence, and confirm it is both accurate and complete.      Dr. Dane Estrada,  MD  Thank you for allowing me to participate in the care of this patient. Please do not hesitate to contact me with any further questions or concerns.         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

## 2025-04-24 ENCOUNTER — TELEPHONE (OUTPATIENT)
Dept: PRIMARY CARE | Facility: CLINIC | Age: 75
End: 2025-04-24
Payer: MEDICARE

## 2025-04-24 DIAGNOSIS — N39.0 URINARY TRACT INFECTION WITHOUT HEMATURIA, SITE UNSPECIFIED: ICD-10-CM

## 2025-04-24 DIAGNOSIS — N30.90 CYSTITIS: Primary | ICD-10-CM

## 2025-04-24 RX ORDER — CIPROFLOXACIN 250 MG/1
250 TABLET, FILM COATED ORAL 2 TIMES DAILY
Qty: 14 TABLET | Refills: 0 | Status: SHIPPED | OUTPATIENT
Start: 2025-04-24 | End: 2025-05-01

## 2025-04-24 NOTE — TELEPHONE ENCOUNTER
Burning and pain with urination.  Had a few accidents yesterday.     Urine culture pended.     Please advise.     Ph: [phone].

## 2025-05-29 ENCOUNTER — APPOINTMENT (OUTPATIENT)
Dept: PRIMARY CARE | Facility: CLINIC | Age: 75
End: 2025-05-29
Payer: MEDICARE

## 2025-05-29 VITALS
HEART RATE: 70 BPM | TEMPERATURE: 96.6 F | WEIGHT: 175 LBS | BODY MASS INDEX: 26.52 KG/M2 | HEIGHT: 68 IN | SYSTOLIC BLOOD PRESSURE: 96 MMHG | DIASTOLIC BLOOD PRESSURE: 60 MMHG

## 2025-05-29 DIAGNOSIS — G81.94 LEFT HEMIPARESIS (MULTI): ICD-10-CM

## 2025-05-29 DIAGNOSIS — E78.00 PURE HYPERCHOLESTEROLEMIA: ICD-10-CM

## 2025-05-29 DIAGNOSIS — I65.21 CAROTID STENOSIS, RIGHT: Primary | ICD-10-CM

## 2025-05-29 PROCEDURE — 99213 OFFICE O/P EST LOW 20 MIN: CPT | Performed by: INTERNAL MEDICINE

## 2025-05-29 PROCEDURE — 1036F TOBACCO NON-USER: CPT | Performed by: INTERNAL MEDICINE

## 2025-05-29 PROCEDURE — 1159F MED LIST DOCD IN RCRD: CPT | Performed by: INTERNAL MEDICINE

## 2025-05-29 PROCEDURE — G2211 COMPLEX E/M VISIT ADD ON: HCPCS | Performed by: INTERNAL MEDICINE

## 2025-05-29 ASSESSMENT — ENCOUNTER SYMPTOMS
DEPRESSION: 0
WEAKNESS: 1
COUGH: 0
ABDOMINAL PAIN: 0
CHOKING: 0
LOSS OF SENSATION IN FEET: 0
NUMBNESS: 0
SHORTNESS OF BREATH: 0
CONSTITUTIONAL NEGATIVE: 1
NAUSEA: 0
CARDIOVASCULAR NEGATIVE: 1
OCCASIONAL FEELINGS OF UNSTEADINESS: 0

## 2025-05-29 ASSESSMENT — COLUMBIA-SUICIDE SEVERITY RATING SCALE - C-SSRS
1. IN THE PAST MONTH, HAVE YOU WISHED YOU WERE DEAD OR WISHED YOU COULD GO TO SLEEP AND NOT WAKE UP?: NO
2. HAVE YOU ACTUALLY HAD ANY THOUGHTS OF KILLING YOURSELF?: NO
6. HAVE YOU EVER DONE ANYTHING, STARTED TO DO ANYTHING, OR PREPARED TO DO ANYTHING TO END YOUR LIFE?: NO

## 2025-05-29 NOTE — PROGRESS NOTES
Subjective   Patient ID: Lukas Shen is a 75 y.o. male who presents for Follow-up (3 mo fu).  Other  This is a chronic (carotid stenosis) problem. The current episode started more than 1 year ago. The problem occurs rarely. The problem has been resolved. Associated symptoms include weakness. Pertinent negatives include no abdominal pain, coughing, nausea or numbness. Treatments tried: ASA, plavix. The treatment provided moderate relief.     Hyperlipidemia  This is a chronic problem. The current episode started more than 1 year ago. The problem is controlled. Recent lipid tests were reviewed and are normal. He has no history of chronic renal disease or diabetes. Pertinent negatives include no chest pain. Current antihyperlipidemic treatment includes statins. The current treatment provides significant improvement of lipids. Risk factors for coronary artery disease include dyslipidemia. High dose statins kept in     Cerebrovascular Accident  This is a chronic problem. The current episode started more than 1 year ago. The problem occurs rarely. The problem has been gradually improving. Associated symptoms include congestion, numbness and weakness. Pertinent negatives include no abdominal pain, anorexia, arthralgias, chest pain or diaphoresis. Recent admission and MRI and echo noted, no new CVA.  Past Medical History  Medical History[1]    Social History  Social History[2]    Family History   Family History[3]    Allergies:  RX Allergies[4]     Outpatient Medications:  Current Outpatient Medications   Medication Instructions    albuterol 90 mcg/actuation inhaler 4 puffs, Every 6 hours PRN    aspirin 81 mg, Daily    atorvastatin (LIPITOR) 80 mg, oral, Daily    clopidogrel (PLAVIX) 75 mg, oral, Daily    docusate sodium (COLACE) 100 mg, Daily    escitalopram (LEXAPRO) 10 mg, oral, Daily    esomeprazole (NEXIUM) 40 mg, oral, Daily    metoprolol succinate XL (TOPROL-XL) 12.5 mg, oral, Every 48 hours, Do not crush or chew.  "       Review of Systems   Constitutional: Negative.    Respiratory:  Negative for cough, choking and shortness of breath.    Cardiovascular: Negative.    Gastrointestinal:  Negative for abdominal pain and nausea.   Musculoskeletal:  Positive for gait problem.   Neurological:  Positive for weakness. Negative for numbness.         Objective       Physical Exam  Vitals reviewed.   Constitutional:       Appearance: Normal appearance. He is normal weight.   HENT:      Head: Normocephalic.   Eyes:      Conjunctiva/sclera: Conjunctivae normal.   Cardiovascular:      Rate and Rhythm: Normal rate and regular rhythm.   Pulmonary:      Effort: Pulmonary effort is normal.      Breath sounds: Normal breath sounds.   Abdominal:      Palpations: Abdomen is soft.   Musculoskeletal:         General: Normal range of motion.      Cervical back: Neck supple.   Skin:     General: Skin is warm and dry.   Neurological:      Motor: Weakness present.      Gait: Gait abnormal.   Psychiatric:         Mood and Affect: Mood normal.     BP 96/60 (BP Location: Right arm, Patient Position: Sitting, BP Cuff Size: Adult)   Pulse 70   Temp 35.9 °C (96.6 °F) (Temporal)   Ht 1.727 m (5' 8\")   Wt 79.4 kg (175 lb)   BMI 26.61 kg/m²      Assessment/Plan   Problem List Items Addressed This Visit       Left hemiparesis (Multi)    Pure hypercholesterolemia    Carotid stenosis, right - Primary   He is doing well, he can taper to discontinue Lexapro, he can take metoprolol 12.5 Monday Wednesday Friday only, his blood pressure remains low, he passed out few times, he is not tachycardic today, if still tired and fatigued beta-blockers can be taken away, so he will be on aspirin, clopidogrel, statins, Nexium.  No aspiration, no fall, he has a mechanical thrombectomy, old stroke related deficit remains.  Excellent care has been provided by wife, he has a CT scan done in December which will serve as a low-dose CT scan for annual lung cancer screening.  " Spasticity remains, he is talking and he is understanding and his depression is in complete remission, any change in the condition will be notified to us, patient's care has been provided by me on a longitudinal basis with management of the chronic problems and if any acute problem arise at 1 focal point of care as patient's spouse relies on us.  Follow-up in 3 months.       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

## 2025-07-08 ENCOUNTER — APPOINTMENT (OUTPATIENT)
Dept: CARDIOLOGY | Facility: CLINIC | Age: 75
End: 2025-07-08
Payer: MEDICARE

## 2025-07-08 VITALS
HEART RATE: 87 BPM | SYSTOLIC BLOOD PRESSURE: 114 MMHG | DIASTOLIC BLOOD PRESSURE: 72 MMHG | BODY MASS INDEX: 26.6 KG/M2 | HEIGHT: 68 IN | WEIGHT: 175.5 LBS

## 2025-07-08 DIAGNOSIS — I47.20 PAROXYSMAL VENTRICULAR TACHYCARDIA: ICD-10-CM

## 2025-07-08 DIAGNOSIS — R55 SYNCOPE AND COLLAPSE: ICD-10-CM

## 2025-07-08 DIAGNOSIS — I65.21 CAROTID STENOSIS, RIGHT: ICD-10-CM

## 2025-07-08 DIAGNOSIS — E78.00 PURE HYPERCHOLESTEROLEMIA: ICD-10-CM

## 2025-07-08 DIAGNOSIS — F17.201 TOBACCO ABUSE, IN REMISSION: ICD-10-CM

## 2025-07-08 PROCEDURE — 99214 OFFICE O/P EST MOD 30 MIN: CPT | Performed by: INTERNAL MEDICINE

## 2025-07-08 PROCEDURE — 1036F TOBACCO NON-USER: CPT | Performed by: INTERNAL MEDICINE

## 2025-07-08 PROCEDURE — 1159F MED LIST DOCD IN RCRD: CPT | Performed by: INTERNAL MEDICINE

## 2025-07-08 ASSESSMENT — ENCOUNTER SYMPTOMS
RESPIRATORY NEGATIVE: 1
NEUROLOGICAL NEGATIVE: 1
CONSTITUTIONAL NEGATIVE: 1
CARDIOVASCULAR NEGATIVE: 1

## 2025-07-08 NOTE — PATIENT INSTRUCTIONS
1  year visit  Continue same medications and treatments.   Patient educated on proper medication use.   Patient educated on risk factor modification.   Please bring any lab results from other providers / physicians to your next appointment.     Please bring all medicines, vitamins, and herbal supplements with you when you come to the office.     Prescriptions will not be filled unless you are compliant with your follow up appointments or have a follow up appointment scheduled as per instruction of your physician. Refills should be requested at the time of your visit.

## 2025-07-08 NOTE — PROGRESS NOTES
CARDIOLOGY OFFICE VISIT      CHIEF COMPLAINT  No chief complaint on file.       HISTORY OF PRESENT ILLNESS  Lukas Shen is a 75 y.o. year old male patient with a history of prior CVA with residual left-sided hemiparesis, hypertension, dyslipidemia and carotid stenosis who had an episode of syncope in the setting of persistent cough secondary to COVID.  Echocardiogram shows normal LV function with no gross valvular abnormalities.  He also had resting tachycardia which has improved on low-dose Toprol-XL diuretics 3 days a week.  He continues to deny any chest pain or shortness of breath with his day-to-day activities.    ASSESSMENT AND PLAN  1.  Resting tachycardia: This is markedly improved on low-dose Toprol-XL as noted above which we will continue.  2.  Status post CVA: With left-sided residual, continue with Plavix and aspirin.  3.  Dyslipidemia: I have personally reviewed his recent lipids which is acceptable, will continue Lipitor 80 mg daily.    Problem List Items Addressed This Visit       Pure hypercholesterolemia    Tobacco abuse, in remission    Carotid stenosis, right    BMI 26.0-26.9,adult     Other Visit Diagnoses         Syncope and collapse          Paroxysmal ventricular tachycardia                  Past Medical History  Medical History[1]    Social History  Social History[2]    Family History   Family History[3]     Allergies:  RX Allergies[4]     Outpatient Medications:  Current Outpatient Medications   Medication Instructions    albuterol 90 mcg/actuation inhaler 4 puffs, Every 6 hours PRN    aspirin 81 mg, Daily    atorvastatin (LIPITOR) 80 mg, oral, Daily    clopidogrel (PLAVIX) 75 mg, oral, Daily    docusate sodium (COLACE) 100 mg, Daily    escitalopram (LEXAPRO) 10 mg, oral, Daily    esomeprazole (NEXIUM) 40 mg, oral, Daily    metoprolol succinate XL (TOPROL-XL) 12.5 mg, oral, Every 48 hours, Do not crush or chew.        Recent Lab Results:  I have personally reviewed the below labs in  "detail;    CBC:   Lab Results   Component Value Date    WBC 8.8 12/14/2024    RBC 3.73 (L) 12/14/2024    HGB 11.7 (L) 12/14/2024    HCT 34.3 (L) 12/14/2024     12/14/2024        CMP:    Lab Results   Component Value Date     12/14/2024    K 3.5 12/14/2024     (H) 12/14/2024    CO2 24 12/14/2024    BUN 14 12/14/2024    CREATININE 1.32 (H) 12/14/2024    GLUCOSE 88 12/14/2024    CALCIUM 8.1 (L) 12/14/2024       Magnesium:    Lab Results   Component Value Date    MG 1.62 12/14/2024       Lipid Profile:    Lab Results   Component Value Date    TRIG 163 (H) 11/08/2024    HDL 28.9 11/08/2024    LDLCALC 66 11/08/2024    LDLDIRECT 66 08/12/2022       TSH:    No results found for: \"TSH\"    BNP:   Lab Results   Component Value Date    BNP 26 06/26/2024        PT/INR:    Lab Results   Component Value Date    PROTIME 12.5 06/26/2024    INR 1.1 06/26/2024       HgBA1c:    Lab Results   Component Value Date    HGBA1C 5.3 04/18/2021       BMP:  Lab Results   Component Value Date     12/14/2024     12/13/2024     12/12/2024    K 3.5 12/14/2024    K 3.8 12/13/2024    K 4.1 12/12/2024     (H) 12/14/2024     (H) 12/13/2024     12/12/2024    CO2 24 12/14/2024    CO2 23 12/13/2024    CO2 27 12/12/2024    BUN 14 12/14/2024    BUN 18 12/13/2024    BUN 22 12/12/2024    CREATININE 1.32 (H) 12/14/2024    CREATININE 1.24 12/13/2024    CREATININE 1.51 (H) 12/12/2024       CBC:  Lab Results   Component Value Date    WBC 8.8 12/14/2024    WBC 13.0 (H) 12/13/2024    WBC 11.7 (H) 12/12/2024    RBC 3.73 (L) 12/14/2024    RBC 3.92 (L) 12/13/2024    RBC 5.01 12/12/2024    HGB 11.7 (L) 12/14/2024    HGB 12.2 (L) 12/13/2024    HGB 15.9 12/12/2024    HCT 34.3 (L) 12/14/2024    HCT 35.8 (L) 12/13/2024    HCT 45.9 12/12/2024    MCV 92 12/14/2024    MCV 91 12/13/2024    MCV 92 12/12/2024    MCH 31.4 12/14/2024    MCH 31.1 12/13/2024    MCH 31.7 12/12/2024    MCHC 34.1 12/14/2024    MCHC 34.1 12/13/2024 "    MCHC 34.6 12/12/2024    RDW 12.9 12/14/2024    RDW 12.9 12/13/2024    RDW 12.7 12/12/2024     12/14/2024     12/13/2024     12/12/2024       Cardiac Enzymes:    Lab Results   Component Value Date    TROPHS 4 12/12/2024    TROPHS 5 06/26/2024    TROPHS 7 06/26/2024       Hepatic Function Panel:    Lab Results   Component Value Date    ALKPHOS 107 12/12/2024    ALT 15 12/12/2024    AST 12 12/12/2024    PROT 7.3 12/12/2024    BILITOT 0.9 12/12/2024    BILIDIR 0.1 04/17/2021         REVIEW OF SYSTEMS  Review of Systems   Constitutional: Negative.   Cardiovascular: Negative.    Respiratory: Negative.     Neurological: Negative.    All other systems reviewed and are negative.      VITALS  There were no vitals filed for this visit.  Wt Readings from Last 4 Encounters:   05/29/25 79.4 kg (175 lb)   04/23/25 78.8 kg (173 lb 12.8 oz)   03/27/25 79.8 kg (176 lb)   02/21/25 80.7 kg (178 lb)       PHYSICAL EXAM  Constitutional:       Appearance: Healthy appearance.   Eyes:      Pupils: Pupils are equal, round, and reactive to light.   Pulmonary:      Effort: Pulmonary effort is normal.      Breath sounds: Normal breath sounds.   Cardiovascular:      PMI at left midclavicular line. Normal rate. Regular rhythm.      Murmurs: There is no murmur.      No gallop.  No click. No rub.   Pulses:     Intact distal pulses.   Musculoskeletal: Normal range of motion.      Cervical back: Normal range of motion. Skin:     General: Skin is warm and dry.   Neurological:      General: No focal deficit present.      Mental Status: Alert and oriented to person, place and time.           I,Cullen Olvera LPN  \ am scribing for, and in the presence of Dr. Daen Estrada MD  .    I, Dr. Dane Estrada MD  , personally performed the services described in the documentation as scribed by Cullen Olvera LPN   in my presence, and confirm it is both accurate and complete.      Dr. Dane Estrada MD  Thank you for allowing me to  participate in the care of this patient. Please do not hesitate to contact me with any further questions or concerns.         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

## 2025-07-09 LAB
ALBUMIN SERPL-MCNC: 4.2 G/DL (ref 3.6–5.1)
ALP SERPL-CCNC: 119 U/L (ref 35–144)
ALT SERPL-CCNC: 17 U/L (ref 9–46)
ANION GAP SERPL CALCULATED.4IONS-SCNC: 9 MMOL/L (CALC) (ref 7–17)
AST SERPL-CCNC: 13 U/L (ref 10–35)
BASOPHILS # BLD AUTO: 41 CELLS/UL (ref 0–200)
BASOPHILS NFR BLD AUTO: 0.5 %
BILIRUB SERPL-MCNC: 0.5 MG/DL (ref 0.2–1.2)
BUN SERPL-MCNC: 17 MG/DL (ref 7–25)
CALCIUM SERPL-MCNC: 9.7 MG/DL (ref 8.6–10.3)
CHLORIDE SERPL-SCNC: 104 MMOL/L (ref 98–110)
CHOLEST SERPL-MCNC: 131 MG/DL
CHOLEST/HDLC SERPL: 4.1 (CALC)
CO2 SERPL-SCNC: 27 MMOL/L (ref 20–32)
CREAT SERPL-MCNC: 1.14 MG/DL (ref 0.7–1.28)
EGFRCR SERPLBLD CKD-EPI 2021: 67 ML/MIN/1.73M2
EOSINOPHIL # BLD AUTO: 90 CELLS/UL (ref 15–500)
EOSINOPHIL NFR BLD AUTO: 1.1 %
ERYTHROCYTE [DISTWIDTH] IN BLOOD BY AUTOMATED COUNT: 13 % (ref 11–15)
GLUCOSE SERPL-MCNC: 94 MG/DL (ref 65–99)
HCT VFR BLD AUTO: 46 % (ref 38.5–50)
HDLC SERPL-MCNC: 32 MG/DL
HGB BLD-MCNC: 15.5 G/DL (ref 13.2–17.1)
LDLC SERPL CALC-MCNC: 78 MG/DL (CALC)
LYMPHOCYTES # BLD AUTO: 1845 CELLS/UL (ref 850–3900)
LYMPHOCYTES NFR BLD AUTO: 22.5 %
MCH RBC QN AUTO: 31.6 PG (ref 27–33)
MCHC RBC AUTO-ENTMCNC: 33.7 G/DL (ref 32–36)
MCV RBC AUTO: 93.9 FL (ref 80–100)
MONOCYTES # BLD AUTO: 492 CELLS/UL (ref 200–950)
MONOCYTES NFR BLD AUTO: 6 %
NEUTROPHILS # BLD AUTO: 5732 CELLS/UL (ref 1500–7800)
NEUTROPHILS NFR BLD AUTO: 69.9 %
NONHDLC SERPL-MCNC: 99 MG/DL (CALC)
PLATELET # BLD AUTO: 254 THOUSAND/UL (ref 140–400)
PMV BLD REES-ECKER: 9.4 FL (ref 7.5–12.5)
POTASSIUM SERPL-SCNC: 3.7 MMOL/L (ref 3.5–5.3)
PROT SERPL-MCNC: 7.1 G/DL (ref 6.1–8.1)
RBC # BLD AUTO: 4.9 MILLION/UL (ref 4.2–5.8)
SODIUM SERPL-SCNC: 140 MMOL/L (ref 135–146)
TRIGL SERPL-MCNC: 131 MG/DL
WBC # BLD AUTO: 8.2 THOUSAND/UL (ref 3.8–10.8)

## 2025-08-13 DIAGNOSIS — E78.00 PURE HYPERCHOLESTEROLEMIA: ICD-10-CM

## 2025-08-13 DIAGNOSIS — I65.21 CAROTID STENOSIS, RIGHT: ICD-10-CM

## 2025-08-13 RX ORDER — ATORVASTATIN CALCIUM 80 MG/1
80 TABLET, FILM COATED ORAL DAILY
Qty: 90 TABLET | Refills: 3 | Status: SHIPPED | OUTPATIENT
Start: 2025-08-13

## 2025-08-29 ENCOUNTER — APPOINTMENT (OUTPATIENT)
Dept: PRIMARY CARE | Facility: CLINIC | Age: 75
End: 2025-08-29
Payer: MEDICARE

## 2025-09-05 ENCOUNTER — APPOINTMENT (OUTPATIENT)
Dept: PRIMARY CARE | Facility: CLINIC | Age: 75
End: 2025-09-05
Payer: MEDICARE

## 2025-09-05 ASSESSMENT — ENCOUNTER SYMPTOMS
MUSCULOSKELETAL NEGATIVE: 1
NERVOUS/ANXIOUS: 0
GASTROINTESTINAL NEGATIVE: 1
DYSPHORIC MOOD: 0
DEPRESSION: 0
SORE THROAT: 0
LOSS OF SENSATION IN FEET: 0
OCCASIONAL FEELINGS OF UNSTEADINESS: 1
CONSTITUTIONAL NEGATIVE: 1
RESPIRATORY NEGATIVE: 1
DIZZINESS: 0
WEAKNESS: 1
WOUND: 0
CARDIOVASCULAR NEGATIVE: 1

## 2025-09-05 ASSESSMENT — COLUMBIA-SUICIDE SEVERITY RATING SCALE - C-SSRS
2. HAVE YOU ACTUALLY HAD ANY THOUGHTS OF KILLING YOURSELF?: NO
6. HAVE YOU EVER DONE ANYTHING, STARTED TO DO ANYTHING, OR PREPARED TO DO ANYTHING TO END YOUR LIFE?: NO
1. IN THE PAST MONTH, HAVE YOU WISHED YOU WERE DEAD OR WISHED YOU COULD GO TO SLEEP AND NOT WAKE UP?: NO

## 2025-09-05 ASSESSMENT — PATIENT HEALTH QUESTIONNAIRE - PHQ9
SUM OF ALL RESPONSES TO PHQ9 QUESTIONS 1 AND 2: 0
2. FEELING DOWN, DEPRESSED OR HOPELESS: NOT AT ALL
1. LITTLE INTEREST OR PLEASURE IN DOING THINGS: NOT AT ALL

## 2025-09-18 ENCOUNTER — APPOINTMENT (OUTPATIENT)
Dept: RADIOLOGY | Facility: HOSPITAL | Age: 75
End: 2025-09-18
Payer: MEDICARE

## 2025-09-18 ENCOUNTER — APPOINTMENT (OUTPATIENT)
Dept: VASCULAR SURGERY | Facility: CLINIC | Age: 75
End: 2025-09-18
Payer: MEDICARE

## 2026-01-05 ENCOUNTER — APPOINTMENT (OUTPATIENT)
Dept: PRIMARY CARE | Facility: CLINIC | Age: 76
End: 2026-01-05
Payer: MEDICARE

## 2026-07-07 ENCOUNTER — APPOINTMENT (OUTPATIENT)
Dept: CARDIOLOGY | Facility: CLINIC | Age: 76
End: 2026-07-07
Payer: MEDICARE